# Patient Record
Sex: FEMALE | Race: WHITE | NOT HISPANIC OR LATINO | Employment: OTHER | URBAN - METROPOLITAN AREA
[De-identification: names, ages, dates, MRNs, and addresses within clinical notes are randomized per-mention and may not be internally consistent; named-entity substitution may affect disease eponyms.]

---

## 2017-02-03 ENCOUNTER — OFFICE VISIT (OUTPATIENT)
Dept: PAIN MEDICINE | Facility: CLINIC | Age: 57
End: 2017-02-03
Payer: MEDICARE

## 2017-02-03 VITALS
DIASTOLIC BLOOD PRESSURE: 80 MMHG | HEART RATE: 76 BPM | SYSTOLIC BLOOD PRESSURE: 110 MMHG | HEIGHT: 66 IN | WEIGHT: 259.56 LBS | RESPIRATION RATE: 20 BRPM | BODY MASS INDEX: 41.71 KG/M2

## 2017-02-03 DIAGNOSIS — M47.816 LUMBAR FACET ARTHROPATHY: ICD-10-CM

## 2017-02-03 DIAGNOSIS — M54.12 CERVICAL RADICULOPATHY: Primary | ICD-10-CM

## 2017-02-03 DIAGNOSIS — M47.812 FACET ARTHROPATHY, CERVICAL: ICD-10-CM

## 2017-02-03 DIAGNOSIS — M50.30 DDD (DEGENERATIVE DISC DISEASE), CERVICAL: ICD-10-CM

## 2017-02-03 PROCEDURE — 99999 PR PBB SHADOW E&M-EST. PATIENT-LVL III: CPT | Mod: PBBFAC,,, | Performed by: ANESTHESIOLOGY

## 2017-02-03 PROCEDURE — 99214 OFFICE O/P EST MOD 30 MIN: CPT | Mod: S$PBB,,, | Performed by: ANESTHESIOLOGY

## 2017-02-03 PROCEDURE — 99213 OFFICE O/P EST LOW 20 MIN: CPT | Mod: PBBFAC | Performed by: ANESTHESIOLOGY

## 2017-02-03 RX ORDER — IBUPROFEN 200 MG
200 TABLET ORAL
COMMUNITY
End: 2021-09-14

## 2017-02-03 NOTE — PROGRESS NOTES
Chronic patient Established Note (Follow up visit)      SUBJECTIVE:    Gricelda Gil presents to the clinic for a follow-up appointment for neck pain. Since the last visit, Gricelda Gil states the pain has been stable. Current pain intensity is 6/10.  She has had C7-T1 Bar x 2, last t one on 9/16/16 with > 50% pain relief, now pain is coming back     Pain Disability Index Review:  Last 3 PDI Scores 2/3/2017 9/9/2016 4/14/2016   Pain Disability Index (PDI) 27 17 31     Pain Medications:      - Opioids: None  - Adjuvant Medications:Diclofenac, Advil,Motrin ( Ibuprofen)  - Anti-Coagulants: None  - Others: See medication card       report: Reviewed and consistent with medication use as prescribed.      Pain Procedures: 9/16/16 C7-T1 CERVICAL EPIDURAL STEROID INJECTION  4/29/16 C7-T1 CERVICAL EPIDURAL STEROID INJECTION, 1/6/12, 11/17/11, 10/5/11, 6/27/11, 3/11/10 Cervical Epidural Steroid Injection      Imaging: 3/9/2016 MRI Cervical Spine Without Contrast        Narrative       Technique: Sagittal T1, sagittal T2, sagittal STIR, axial gradient and axial T2 images of the cervical spine were obtained without contrast.    Results: The craniocervical junction is intact. There is no evidence for a Chiari malformation. The spinal cord is normal in signal without cord edema or myelomalacia.    The incidentally visualized soft tissues structures of the neck are within normal limits.    Vertebral body alignment is maintained with straightening of the normal cervical lordosis. Vertebral body heights are within normal limits. There is disc desiccation throughout the cervical spine with disc space narrowing. The marrow signal is normal without evidence for a marrow placement process, infection or tumor.    At C2-3, no disk herniation, central canal stenosis or neural foraminal narrowing.    At C3-4, no disk herniation, central canal stenosis or neural foraminal narrowing.    At C4-5, no disk herniation,  central canal stenosis or neural foraminal narrowing.    At C5-6, there is bilateral uncovertebral spurring without central canal stenosis neural foraminal narrowing.    At C6-7, there is a posterior disc osteophyte complex with bilateral uncovertebral spurring contributing to mild left-sided neural foraminal narrowing.    At C7-T1, no disk herniation, central canal stenosis or neural foraminal narrowing.        Impression           Mild spondylosis of the cervical spine contribute to a most left-sided neural foraminal narrowing at C6-7. No central canal stenosis         Allergies: Review of patient's allergies indicates:  No Known Allergies    Current Medications:   Current Outpatient Prescriptions   Medication Sig Dispense Refill    atorvastatin (LIPITOR) 20 MG tablet TAKE 1 TABLET (20 MG TOTAL) BY MOUTH ONCE DAILY. 90 tablet 2    BYSTOLIC 10 mg Tab TAKE 1 TABLET (10 MG TOTAL) BY MOUTH ONCE DAILY. 30 tablet 5    clobetasol (TEMOVATE) 0.05 % cream Apply topically 2 (two) times daily. 60 g 1    diclofenac (VOLTAREN) 75 MG EC tablet TAKE 1 TABLET (75 MG TOTAL) BY MOUTH 2 (TWO) TIMES DAILY. 60 tablet 1    doxepin (SINEQUAN) 75 MG capsule Take 75 mg by mouth every evening.  3    ibuprofen (ADVIL,MOTRIN) 200 MG tablet Take 200 mg by mouth as needed for Pain.      levothyroxine (SYNTHROID) 112 MCG tablet TAKE 1 TABLET (112 MCG TOTAL) BY MOUTH ONCE DAILY. 30 tablet 5    lisinopril 10 MG tablet Take 1 tablet (10 mg total) by mouth once daily. 30 tablet 5    quetiapine (SEROQUEL) 400 MG tablet Take 200 mg by mouth nightly.   3    lamotrigine (LAMICTAL) 100 MG tablet Take 100 mg by mouth once daily.   4     No current facility-administered medications for this visit.        REVIEW OF SYSTEMS:    .GENERAL: No weight loss, malaise or fevers.  HEENT: Negative for frequent or significant headaches.  NECK: + neck pain  RESPIRATORY: Negative for cough, wheezing or shortness of breath.  CARDIOVASCULAR: Negative for chest  pain, leg swelling or palpitations.  GI: + abdominal discomfort, -ve for blood in stools or black stools or change in bowel habits.  MUSCULOSKELETAL: See HPI.  SKIN: Negative for lesions, rash, and itching.  PSYCH: + for sleep disturbance, + bipolar disorder, + anxiety   HEMATOLOGY/LYMPHOLOGY: Negative for prolonged bleeding, bruising easily or swollen nodes.  NEURO: No history of headaches, syncope, paralysis, seizures or tremors.  All other reviewed and negative other than HPI    Past Medical History:  Past Medical History   Diagnosis Date    Arthritis     Bipolar disorder     Depression     History of psychiatric care     History of psychiatric hospitalization     Hyperlipidemia     Hypertension     Insomnia     Lupus     Psychiatric problem     Self-harming behavior     Stroke 2011     TIA - neck    Suicide attempt     Therapy     Thyroid disease        Past Surgical History:  Past Surgical History   Procedure Laterality Date    Rt shoulder surgery Right 2004     scope    Uvulopalatopharyngoplasty  2014    Nasal septum surgery  2014    Hysterectomy  1981    Tonsillectomy  2014    Adenoidectomy       section  1980    Carpal tunnel release         Family History:  Family History   Problem Relation Age of Onset    Cancer Father     No Known Problems Mother     Heart disease Sister     Heart disease Maternal Aunt     Heart disease Maternal Uncle     Cancer Paternal Aunt     Heart disease Maternal Grandfather     No Known Problems Brother     No Known Problems Paternal Uncle     No Known Problems Maternal Grandmother     No Known Problems Paternal Grandfather     No Known Problems Paternal Grandmother     ADD / ADHD Neg Hx     Alcohol abuse Neg Hx     Anxiety disorder Neg Hx     Bipolar disorder Neg Hx     Depression Neg Hx     Drug abuse Neg Hx     OCD Neg Hx     Paranoid behavior Neg Hx     Schizophrenia Neg Hx     Self injury Neg Hx     Suicide Neg Hx      "Breast cancer Neg Hx     Ovarian cancer Neg Hx     Colon cancer Neg Hx        Social History:  Social History     Social History    Marital status: Single     Spouse name: N/A    Number of children: 3    Years of education: N/A     Social History Main Topics    Smoking status: Former Smoker     Types: Cigarettes     Quit date: 8/1/2009    Smokeless tobacco: Never Used    Alcohol use No    Drug use: No    Sexual activity: Not Currently      Comment: , single     Other Topics Concern    Not on file     Social History Narrative       OBJECTIVE:    Visit Vitals    /80 (BP Location: Left arm, Patient Position: Sitting, BP Method: Manual)    Pulse 76    Resp 20    Ht 5' 5.5" (1.664 m)    Wt 117.8 kg (259 lb 9.5 oz)    BMI 42.54 kg/m2       PHYSICAL EXAMINATION:    General appearance: Well appearing, in no acute distress, alert and oriented x3.Morbidly obese  Psych: Mood and affect appropriate.  Skin: Skin color, texture, turgor normal, no rashes or lesions, in both upper and lower body.  Head/face: Atraumatic, normocephalic. No palpable lymph nodes  Neck: + pain to palpation over the lower C spine . + pain with neck extension and lateral flexion  Back: Straight leg raising in the sitting and supine positions is negative to radicular pain. + TTP over the L spine   Extremities: Peripheral joint ROM is full and pain free without obvious instability or laxity in all four extremities. No deformities, edema, or skin discoloration. Good capillary refill.  Musculoskeletal: + facet loading bilaterally . Bilateral upper and lower extremity strength is normal and symmetric. No atrophy or tone abnormalities are noted.  Neuro: Bilateral upper and lower extremity coordination and muscle stretch reflexes are physiologic and symmetric. Plantar response are downgoing. No loss of sensation is noted.  Gait: Normal.    ASSESSMENT: 57 y.o. year old female with neck pain, consistent with cervical DDD, cervical " facet arthropathy and cervical radiculopathy. She has had C7-T1 Bar x 2, last t one on 9/16/16 with > 50% pain relief, now pain is coming back   She has today a new complaint of low back pain likely  related to lumbar facet arthropathy.     1. Cervical radiculopathy    2. Facet arthropathy, cervical    3. DDD (degenerative disc disease), cervical    4. Lumbar facet arthropathy          PLAN:     - I have stressed the importance of physical activity and a home exercise plan to help with pain and improve health.  -Sf bilateral C4,5,6 facet MBB  -Order xray of the L spine   - RTC 3 weeks after injection  - Counseled patient regarding the importance of activity modification, constant sleeping habits, physical therapy and weight loss.    The above plan and management options were discussed at length with patient. Patient is in agreement with the above and verbalized understanding.    Anisa Buck  02/03/2017

## 2017-02-06 ENCOUNTER — HOSPITAL ENCOUNTER (OUTPATIENT)
Dept: RADIOLOGY | Facility: HOSPITAL | Age: 57
Discharge: HOME | End: 2017-02-06
Attending: ANESTHESIOLOGY
Payer: MEDICARE

## 2017-02-06 DIAGNOSIS — M47.816 LUMBAR FACET ARTHROPATHY: ICD-10-CM

## 2017-02-06 PROCEDURE — 72100 X-RAY EXAM L-S SPINE 2/3 VWS: CPT | Mod: TC

## 2017-02-06 PROCEDURE — 72100 X-RAY EXAM L-S SPINE 2/3 VWS: CPT | Mod: 26,,, | Performed by: RADIOLOGY

## 2017-02-22 ENCOUNTER — OFFICE VISIT (OUTPATIENT)
Dept: FAMILY MEDICINE | Facility: CLINIC | Age: 57
End: 2017-02-22
Payer: MEDICARE

## 2017-02-22 VITALS
BODY MASS INDEX: 43.52 KG/M2 | HEART RATE: 88 BPM | HEIGHT: 65 IN | WEIGHT: 261.19 LBS | DIASTOLIC BLOOD PRESSURE: 86 MMHG | SYSTOLIC BLOOD PRESSURE: 120 MMHG

## 2017-02-22 DIAGNOSIS — M47.812 FACET ARTHROPATHY, CERVICAL: Primary | ICD-10-CM

## 2017-02-22 DIAGNOSIS — I10 ESSENTIAL HYPERTENSION: ICD-10-CM

## 2017-02-22 DIAGNOSIS — E03.4 HYPOTHYROIDISM DUE TO ACQUIRED ATROPHY OF THYROID: ICD-10-CM

## 2017-02-22 DIAGNOSIS — L25.9 CONTACT DERMATITIS, UNSPECIFIED CONTACT DERMATITIS TYPE, UNSPECIFIED TRIGGER: ICD-10-CM

## 2017-02-22 LAB
ANION GAP SERPL CALC-SCNC: 8 MMOL/L
BUN SERPL-MCNC: 8 MG/DL
CALCIUM SERPL-MCNC: 9.8 MG/DL
CHLORIDE SERPL-SCNC: 105 MMOL/L
CO2 SERPL-SCNC: 27 MMOL/L
CREAT SERPL-MCNC: 0.9 MG/DL
EST. GFR  (AFRICAN AMERICAN): >60 ML/MIN/1.73 M^2
EST. GFR  (NON AFRICAN AMERICAN): >60 ML/MIN/1.73 M^2
GLUCOSE SERPL-MCNC: 90 MG/DL
POTASSIUM SERPL-SCNC: 4 MMOL/L
SODIUM SERPL-SCNC: 140 MMOL/L

## 2017-02-22 PROCEDURE — 99214 OFFICE O/P EST MOD 30 MIN: CPT | Mod: S$PBB,,, | Performed by: FAMILY MEDICINE

## 2017-02-22 PROCEDURE — 80048 BASIC METABOLIC PNL TOTAL CA: CPT

## 2017-02-22 PROCEDURE — 99999 PR PBB SHADOW E&M-EST. PATIENT-LVL II: CPT | Mod: PBBFAC,,, | Performed by: FAMILY MEDICINE

## 2017-02-22 PROCEDURE — 36415 COLL VENOUS BLD VENIPUNCTURE: CPT | Mod: PBBFAC

## 2017-02-22 PROCEDURE — 99212 OFFICE O/P EST SF 10 MIN: CPT | Mod: PBBFAC | Performed by: FAMILY MEDICINE

## 2017-02-22 RX ORDER — NEBIVOLOL 10 MG/1
10 TABLET ORAL DAILY
Qty: 30 TABLET | Refills: 5 | Status: SHIPPED | OUTPATIENT
Start: 2017-02-22 | End: 2017-07-07 | Stop reason: SDUPTHER

## 2017-02-22 RX ORDER — LISINOPRIL 10 MG/1
10 TABLET ORAL DAILY
Qty: 30 TABLET | Refills: 5 | Status: SHIPPED | OUTPATIENT
Start: 2017-02-22 | End: 2017-08-18 | Stop reason: SDUPTHER

## 2017-02-22 RX ORDER — LEVOTHYROXINE SODIUM 112 UG/1
TABLET ORAL
Qty: 30 TABLET | Refills: 5 | Status: SHIPPED | OUTPATIENT
Start: 2017-02-22 | End: 2017-11-21 | Stop reason: SDUPTHER

## 2017-02-22 NOTE — MR AVS SNAPSHOT
[facility]  [address]  [facility] [address]  Phone: [phone]  Fax: [phone]                  Gricelda Gil   2017 1:00 PM   Office Visit    Description:  Female : 1960   Provider:  Major Kimble MD   Department:  [facility]           Reason for Visit     Follow-up           Diagnoses this Visit        Comments    Facet arthropathy, cervical    -  Primary     Hypothyroidism due to acquired atrophy of thyroid         Contact dermatitis, unspecified contact dermatitis type, unspecified trigger         Essential hypertension                To Do List           Future Appointments        Provider Department Dept Phone    3/10/2017 9:30 AM Pako Drew [facility] - [facility] Management [phone]    8/15/2017 10:15 AM Major Kimble MD [facility] [phone]      Goals (5 Years of Data)     None      Follow-Up and Disposition     Return in about 6 months (around 2017).    Follow-up and Disposition History       These Medications        Disp Refills Start End    levothyroxine (SYNTHROID) 112 MCG tablet 30 tablet 5 2017     TAKE 1 TABLET (112 MCG TOTAL) BY MOUTH ONCE DAILY.    Pharmacy: [facility]/pharmacy #[id number] [place], [place] - [address] Ph #: [phone]       lisinopril 10 MG tablet 30 tablet 5 2017     Take 1 tablet (10 mg total) by mouth once daily. - Oral    Pharmacy: [facility]/pharmacy #[id number] [place], [place] - [address] Ph #: [phone]       nebivolol (BYSTOLIC) 10 MG Tab 30 tablet 5 2017     Take 1 tablet (10 mg total) by mouth once daily. - Oral    Pharmacy: [facility]/pharmacy #[id number] [place], [place] - [address] Ph #: [phone]         [facility] On Call     [facility] On Call Nurse Care Line -  Assistance  Registered nurses in the [facility] provide clinical advisement, health education, appointment booking, and other advisory services.  Call for this free service at [phone].       "       Medications           Message regarding Medications     Verify the changes and/or additions to your medication regime listed below are the same as discussed with your clinician today.  If any of these changes or additions are incorrect, please notify your healthcare provider.        CHANGE how you are taking these medications     Start Taking Instead of    nebivolol (BYSTOLIC) 10 MG Tab BYSTOLIC 10 mg Tab    Dosage:  Take 1 tablet (10 mg total) by mouth once daily. Dosage:  TAKE 1 TABLET (10 MG TOTAL) BY MOUTH ONCE DAILY.    Reason for Change:  Reorder            Verify that the below list of medications is an accurate representation of the medications you are currently taking.  If none reported, the list may be blank. If incorrect, please contact your healthcare provider. Carry this list with you in case of emergency.           Current Medications     atorvastatin (LIPITOR) 20 MG tablet TAKE 1 TABLET (20 MG TOTAL) BY MOUTH ONCE DAILY.    clobetasol (TEMOVATE) 0.05 % cream Apply topically 2 (two) times daily.    diclofenac (VOLTAREN) 75 MG EC tablet TAKE 1 TABLET (75 MG TOTAL) BY MOUTH 2 (TWO) TIMES DAILY.    doxepin (SINEQUAN) 75 MG capsule Take 75 mg by mouth every evening.    ibuprofen (ADVIL,MOTRIN) 200 MG tablet Take 200 mg by mouth as needed for Pain.    levothyroxine (SYNTHROID) 112 MCG tablet TAKE 1 TABLET (112 MCG TOTAL) BY MOUTH ONCE DAILY.    lisinopril 10 MG tablet Take 1 tablet (10 mg total) by mouth once daily.    nebivolol (BYSTOLIC) 10 MG Tab Take 1 tablet (10 mg total) by mouth once daily.    quetiapine (SEROQUEL) 400 MG tablet Take 200 mg by mouth nightly.     lamotrigine (LAMICTAL) 100 MG tablet Take 100 mg by mouth once daily.            Clinical Reference Information           Your Vitals Were     BP Pulse Height Weight BMI    120/86 (BP Location: Left arm, Patient Position: Sitting, BP Method: Manual) 88 5' 5" (1.651 m) 118.5 kg (261 lb 3.2 oz) 43.47 kg/m2      Blood Pressure          " Most Recent Value    BP  120/86      Allergies as of 2/22/2017     No Known Allergies      Immunizations Administered on Date of Encounter - 2/22/2017     None      Orders Placed During Today's Visit     Future Labs/Procedures Expected by Expires    Basic metabolic panel  2/22/2017 2/22/2018      Language Assistance Services     ATTENTION: Language assistance services are available, free of charge. Please call [phone].      ATENCIÓN: Si habla español, tiene a romo disposición servicios gratuitos de asistencia lingüística. Llame al [phone].     CHÚ Ý: N?u b?n nói Ti?ng Vi?t, có các d?ch v? h? tr? ngôn ng? mi?n phí dành cho b?n. G?i s? [phone].         [facility] complies with applicable Federal civil rights laws and does not discriminate on the basis of race, color, national origin, age, disability, or sex.

## 2017-02-22 NOTE — PROGRESS NOTES
Subjective:       Patient ID: Gricelda Gil is a 57 y.o. female.    Chief Complaint: Follow-up    Pt is a 57 y.o. female who presents for evaluation and management of   Encounter Diagnoses   Name Primary?    Hypothyroidism due to acquired atrophy of thyroid     Contact dermatitis, unspecified contact dermatitis type, unspecified trigger     Essential hypertension     Facet arthropathy, cervical Yes   .  Doing well on current meds. Denies any side effects. Prevention is up to date.  Review of Systems   Constitutional: Negative for chills and fever.   Respiratory: Negative for shortness of breath.    Cardiovascular: Negative for chest pain and palpitations.   Gastrointestinal: Negative for abdominal pain, blood in stool, constipation and nausea.   Genitourinary: Negative for difficulty urinating.   Musculoskeletal: Positive for neck pain.   Psychiatric/Behavioral: Positive for sleep disturbance. Negative for dysphoric mood and suicidal ideas. The patient is not nervous/anxious.        Objective:      Physical Exam   Constitutional: She is oriented to person, place, and time. She appears well-developed and well-nourished.   HENT:   Head: Normocephalic and atraumatic.   Right Ear: External ear normal.   Left Ear: External ear normal.   Nose: Nose normal.   Mouth/Throat: Oropharynx is clear and moist.   Eyes: EOM are normal. Pupils are equal, round, and reactive to light.   Neck: Normal range of motion. Neck supple. No JVD present. No tracheal deviation present. No thyromegaly present.   Cardiovascular: Normal rate, normal heart sounds and intact distal pulses.    No murmur heard.  Pulmonary/Chest: Effort normal and breath sounds normal. No respiratory distress. She has no wheezes. She has no rales. She exhibits no tenderness.   Abdominal: Soft. Bowel sounds are normal. She exhibits no distension and no mass. There is no tenderness. There is no rebound and no guarding.   Musculoskeletal: Normal range of  motion. She exhibits no edema or tenderness.   Lymphadenopathy:     She has no cervical adenopathy.   Neurological: She is alert and oriented to person, place, and time. She has normal reflexes. She displays normal reflexes. No cranial nerve deficit. She exhibits normal muscle tone. Coordination normal.   Skin: Skin is warm and dry. No rash noted. No erythema. No pallor.   Psychiatric: She has a normal mood and affect. Her behavior is normal. Judgment and thought content normal.       Assessment:       1. Facet arthropathy, cervical    2. Hypothyroidism due to acquired atrophy of thyroid    3. Contact dermatitis, unspecified contact dermatitis type, unspecified trigger    4. Essential hypertension        Plan:   Gricelda was seen today for follow-up.    Diagnoses and all orders for this visit:    Facet arthropathy, cervical    Hypothyroidism due to acquired atrophy of thyroid  -     levothyroxine (SYNTHROID) 112 MCG tablet; TAKE 1 TABLET (112 MCG TOTAL) BY MOUTH ONCE DAILY.    Contact dermatitis, unspecified contact dermatitis type, unspecified trigger  -     lisinopril 10 MG tablet; Take 1 tablet (10 mg total) by mouth once daily.    Essential hypertension  -     Basic metabolic panel; Future  -     nebivolol (BYSTOLIC) 10 MG Tab; Take 1 tablet (10 mg total) by mouth once daily.    to see psych in April nad pain mngt in march  Follow with specialists  Needs fasting labs next visit   RTC 6 months     No Follow-up on file.

## 2017-03-10 ENCOUNTER — OFFICE VISIT (OUTPATIENT)
Dept: PAIN MEDICINE | Facility: CLINIC | Age: 57
End: 2017-03-10
Payer: MEDICARE

## 2017-03-10 VITALS
HEIGHT: 66 IN | SYSTOLIC BLOOD PRESSURE: 124 MMHG | DIASTOLIC BLOOD PRESSURE: 88 MMHG | RESPIRATION RATE: 17 BRPM | HEART RATE: 88 BPM | WEIGHT: 264.75 LBS | BODY MASS INDEX: 42.55 KG/M2

## 2017-03-10 DIAGNOSIS — M47.812 FACET ARTHROPATHY, CERVICAL: Primary | ICD-10-CM

## 2017-03-10 DIAGNOSIS — M54.2 NECK PAIN: ICD-10-CM

## 2017-03-10 DIAGNOSIS — M50.30 DDD (DEGENERATIVE DISC DISEASE), CERVICAL: ICD-10-CM

## 2017-03-10 DIAGNOSIS — M47.812 FACET HYPERTROPHY OF CERVICAL REGION: ICD-10-CM

## 2017-03-10 PROCEDURE — 99214 OFFICE O/P EST MOD 30 MIN: CPT | Mod: PBBFAC | Performed by: NURSE PRACTITIONER

## 2017-03-10 PROCEDURE — 99213 OFFICE O/P EST LOW 20 MIN: CPT | Mod: S$PBB,,, | Performed by: NURSE PRACTITIONER

## 2017-03-10 PROCEDURE — 99999 PR PBB SHADOW E&M-EST. PATIENT-LVL IV: CPT | Mod: PBBFAC,,, | Performed by: NURSE PRACTITIONER

## 2017-03-10 NOTE — PROGRESS NOTES
Chronic patient Established Note (Follow up visit)      SUBJECTIVE:    Gricelda Gil presents to the clinic for a follow-up appointment for neck pain  consistent with cervical DDD, cervical facet arthropathy and cervical radiculopathy. She has had C7-T1 Bar x 2, last t one on 16 with > 50% pain relief, now pain is coming back, she was previously s/f an injection but had to reschedule, discussed that we will s/f a bilateral C4,5,6 MBB facet injection, patient understood and agreed.  Since the last visit, Gricelda Gil states the pain has been stable. Current pain intensity is 7/10.    Pain Disability Index Review:  Last 3 PDI Scores 3/10/2017 2/3/2017 2016   Pain Disability Index (PDI) 17 27 17     Pain Medications:      - Opioids: None  - Adjuvant Medications:Diclofenac, Advil,Motrin ( Ibuprofen)  - Anti-Coagulants: None  - Others: See medication card       report: Reviewed and consistent with medication use as prescribed.      Pain Procedures: 16 C7-T1 CERVICAL EPIDURAL STEROID INJECTION 16 C7-T1 CERVICAL EPIDURAL STEROID INJECTION, 12, 11, 10/5/11, 11, 3/11/10 Cervical Epidural Steroid Injection      Imagin17 X-Ray Lumbar Spine AP And Lateral  Narrative   Lumbar spine, AP and lateral: There is a mild dextroscoliotic curvature of the lumbar spine.  Vertebral body heights are maintained.  There is minimal disc space narrowing with end plate sclerosis throughout.  There is facet arthropathy the lower lumbar spine.  No fracture or subluxation is identified.  Vascular calcifications are noted.   Impression    As above.            3/9/2016 MRI Cervical Spine Without Contrast          Narrative       Technique: Sagittal T1, sagittal T2, sagittal STIR, axial gradient and axial T2 images of the cervical spine were obtained without contrast.    Results: The craniocervical junction is intact. There is no evidence for a Chiari malformation. The spinal cord is  normal in signal without cord edema or myelomalacia.    The incidentally visualized soft tissues structures of the neck are within normal limits.    Vertebral body alignment is maintained with straightening of the normal cervical lordosis. Vertebral body heights are within normal limits. There is disc desiccation throughout the cervical spine with disc space narrowing. The marrow signal is normal without evidence for a marrow placement process, infection or tumor.    At C2-3, no disk herniation, central canal stenosis or neural foraminal narrowing.    At C3-4, no disk herniation, central canal stenosis or neural foraminal narrowing.    At C4-5, no disk herniation, central canal stenosis or neural foraminal narrowing.    At C5-6, there is bilateral uncovertebral spurring without central canal stenosis neural foraminal narrowing.    At C6-7, there is a posterior disc osteophyte complex with bilateral uncovertebral spurring contributing to mild left-sided neural foraminal narrowing.    At C7-T1, no disk herniation, central canal stenosis or neural foraminal narrowing.        Impression           Mild spondylosis of the cervical spine contribute to a most left-sided neural foraminal narrowing at C6-7. No central canal stenosis         Allergies: Review of patient's allergies indicates:  No Known Allergies    Current Medications:   Current Outpatient Prescriptions   Medication Sig Dispense Refill    atorvastatin (LIPITOR) 20 MG tablet TAKE 1 TABLET (20 MG TOTAL) BY MOUTH ONCE DAILY. 90 tablet 2    clobetasol (TEMOVATE) 0.05 % cream Apply topically 2 (two) times daily. 60 g 1    diclofenac (VOLTAREN) 75 MG EC tablet TAKE 1 TABLET (75 MG TOTAL) BY MOUTH 2 (TWO) TIMES DAILY. 60 tablet 1    doxepin (SINEQUAN) 75 MG capsule Take 75 mg by mouth every evening.  3    ibuprofen (ADVIL,MOTRIN) 200 MG tablet Take 200 mg by mouth as needed for Pain.      lamotrigine (LAMICTAL) 100 MG tablet Take 100 mg by mouth once daily.   4     levothyroxine (SYNTHROID) 112 MCG tablet TAKE 1 TABLET (112 MCG TOTAL) BY MOUTH ONCE DAILY. 30 tablet 5    lisinopril 10 MG tablet Take 1 tablet (10 mg total) by mouth once daily. 30 tablet 5    nebivolol (BYSTOLIC) 10 MG Tab Take 1 tablet (10 mg total) by mouth once daily. 30 tablet 5    quetiapine (SEROQUEL) 400 MG tablet Take 200 mg by mouth nightly.   3     No current facility-administered medications for this visit.        REVIEW OF SYSTEMS:    GENERAL: No weight loss, malaise or fevers.  HEENT: Negative for frequent or significant headaches.  NECK: + neck pain  RESPIRATORY: Negative for cough, wheezing or shortness of breath.  CARDIOVASCULAR: Negative for chest pain, leg swelling or palpitations.  GI: + abdominal discomfort, -ve for blood in stools or black stools or change in bowel habits.  MUSCULOSKELETAL: See HPI.  SKIN: Negative for lesions, rash, and itching.  PSYCH: + for sleep disturbance, + bipolar disorder, + anxiety   HEMATOLOGY/LYMPHOLOGY: Negative for prolonged bleeding, bruising easily or swollen nodes.  NEURO: No history of headaches, syncope, paralysis, seizures or tremors.  All other reviewed and negative other than HPI    Past Medical History:  Past Medical History:   Diagnosis Date    Arthritis     Bipolar disorder     Depression     History of psychiatric care     History of psychiatric hospitalization     Hyperlipidemia     Hypertension     Insomnia     Lupus     Psychiatric problem     Self-harming behavior     Stroke 2011    TIA - neck    Suicide attempt     Therapy     Thyroid disease        Past Surgical History:  Past Surgical History:   Procedure Laterality Date    ADENOIDECTOMY      CARPAL TUNNEL RELEASE  1983     SECTION  1980    HYSTERECTOMY  1981    NASAL SEPTUM SURGERY  2014    Rt shoulder surgery Right 2004    scope    TONSILLECTOMY  2014    UVULOPALATOPHARYNGOPLASTY  2014       Family History:  Family History   Problem Relation Age of Onset  "   Cancer Father     No Known Problems Mother     Heart disease Sister     Heart disease Maternal Aunt     Heart disease Maternal Uncle     Cancer Paternal Aunt     Heart disease Maternal Grandfather     No Known Problems Brother     No Known Problems Paternal Uncle     No Known Problems Maternal Grandmother     No Known Problems Paternal Grandfather     No Known Problems Paternal Grandmother     ADD / ADHD Neg Hx     Alcohol abuse Neg Hx     Anxiety disorder Neg Hx     Bipolar disorder Neg Hx     Depression Neg Hx     Drug abuse Neg Hx     OCD Neg Hx     Paranoid behavior Neg Hx     Schizophrenia Neg Hx     Self injury Neg Hx     Suicide Neg Hx     Breast cancer Neg Hx     Ovarian cancer Neg Hx     Colon cancer Neg Hx        Social History:  Social History     Social History    Marital status: Single     Spouse name: N/A    Number of children: 3    Years of education: N/A     Social History Main Topics    Smoking status: Former Smoker     Types: Cigarettes     Quit date: 8/1/2009    Smokeless tobacco: Never Used    Alcohol use No    Drug use: No    Sexual activity: Not Currently      Comment: , single     Other Topics Concern    Not on file     Social History Narrative       OBJECTIVE:    /88 (BP Location: Right arm, Patient Position: Sitting, BP Method: Manual)  Pulse 88  Resp 17  Ht 5' 5.5" (1.664 m)  Wt 120.1 kg (264 lb 12.4 oz)  BMI 43.39 kg/m2    PHYSICAL EXAMINATION:    General appearance: Well appearing, in no acute distress, alert and oriented x3.Morbidly obese  Psych: Mood and affect appropriate.  Skin: Skin color, texture, turgor normal, no rashes or lesions, in both upper and lower body.  Head/face: Atraumatic, normocephalic. No palpable lymph nodes  Neck: + pain to palpation over the lower C spine . + pain with neck extension and lateral flexion  Back: Straight leg raising in the sitting and supine positions is negative to radicular pain. + TTP over " the L spine   Extremities: Peripheral joint ROM is full and pain free without obvious instability or laxity in all four extremities. No deformities, edema, or skin discoloration. Good capillary refill.  Musculoskeletal: + facet loading bilaterally . Bilateral upper and lower extremity strength is normal and symmetric. No atrophy or tone abnormalities are noted.  Neuro: Bilateral upper and lower extremity coordination and muscle stretch reflexes are physiologic and symmetric. Plantar response are downgoing. No loss of sensation is noted.  Gait: Normal.     ASSESSMENT: 57 y.o. year old female with neck pain, consistent with cervical DDD, cervical facet arthropathy and cervical radiculopathy. She has had C7-T1 Bar x 2, last t one on 9/16/16 with > 50% pain relief, now pain is coming back        1. Facet arthropathy, cervical     2. Facet hypertrophy of cervical region     3. DDD (degenerative disc disease), cervical     4. Neck pain  Case Request Operating Room: BLOCK-NERVE-MEDIAL BRANCH-CERVICAL- Bilateral C4,5,6    [facility]         PLAN:     - I have stressed the importance of physical activity and a home exercise plan to help with pain and improve health.  - Patient can continue with medications for now since they are providing benefits, using them appropriately, and without side effects.  - Counseled patient regarding the importance of activity modification, constant sleeping habits and physical therapy.  - Schedule for a Diagnostic/Therapeutic Medial branch block at Cervical @ C 4, 5, 6 bilaterally  to help with axial neck pain and progress with a home exercise program.  -I have explained the risks, benefits, and alternatives of the procedure in detail. The patient voices understanding and all questions have been answered. The patient agrees to proceed as planned. Written Consent obtained.   -RTC 2-3 weeks following procedure.  -The above plan and management options were discussed at length with  patient. Patient is in agreement with the above and verbalized understanding. The physician  was consulted on this patient  and agrees with this plan.     STEVE Munoz    03/10/2017

## 2017-03-12 RX ORDER — DICLOFENAC SODIUM 75 MG/1
TABLET, DELAYED RELEASE ORAL
Qty: 60 TABLET | Refills: 1 | Status: SHIPPED | OUTPATIENT
Start: 2017-03-12 | End: 2017-07-07 | Stop reason: ALTCHOICE

## 2017-03-20 ENCOUNTER — OFFICE VISIT (OUTPATIENT)
Dept: INTERNAL MEDICINE | Facility: CLINIC | Age: 57
End: 2017-03-20
Payer: MEDICARE

## 2017-03-20 ENCOUNTER — PATIENT MESSAGE (OUTPATIENT)
Dept: ADMINISTRATIVE | Facility: OTHER | Age: 57
End: 2017-03-20

## 2017-03-20 VITALS
OXYGEN SATURATION: 98 % | HEIGHT: 66 IN | SYSTOLIC BLOOD PRESSURE: 140 MMHG | HEART RATE: 98 BPM | BODY MASS INDEX: 42.27 KG/M2 | TEMPERATURE: 100 F | DIASTOLIC BLOOD PRESSURE: 94 MMHG | WEIGHT: 263 LBS | RESPIRATION RATE: 18 BRPM

## 2017-03-20 DIAGNOSIS — B34.9 VIRAL SYNDROME: Primary | ICD-10-CM

## 2017-03-20 LAB
BILIRUB SERPL-MCNC: NEGATIVE MG/DL
BLOOD URINE, POC: NEGATIVE
COLOR, POC UA: YELLOW
GLUCOSE UR QL STRIP: NEGATIVE
KETONES UR QL STRIP: NEGATIVE
LEUKOCYTE ESTERASE URINE, POC: NEGATIVE
NITRITE, POC UA: NEGATIVE
PH, POC UA: 5
PROTEIN, POC: NEGATIVE
SPECIFIC GRAVITY, POC UA: 1
UROBILINOGEN, POC UA: NEGATIVE

## 2017-03-20 PROCEDURE — 81001 URINALYSIS AUTO W/SCOPE: CPT | Mod: PBBFAC | Performed by: NURSE PRACTITIONER

## 2017-03-20 PROCEDURE — 1160F RVW MEDS BY RX/DR IN RCRD: CPT | Performed by: NURSE PRACTITIONER

## 2017-03-20 PROCEDURE — 99999 PR PBB SHADOW E&M-EST. PATIENT-LVL IV: CPT | Mod: PBBFAC,,, | Performed by: NURSE PRACTITIONER

## 2017-03-20 PROCEDURE — 99213 OFFICE O/P EST LOW 20 MIN: CPT | Mod: S$PBB | Performed by: NURSE PRACTITIONER

## 2017-03-20 PROCEDURE — 99214 OFFICE O/P EST MOD 30 MIN: CPT | Mod: PBBFAC | Performed by: NURSE PRACTITIONER

## 2017-03-20 PROCEDURE — 3080F DIAST BP >= 90 MM HG: CPT | Performed by: NURSE PRACTITIONER

## 2017-03-20 PROCEDURE — 3077F SYST BP >= 140 MM HG: CPT | Performed by: NURSE PRACTITIONER

## 2017-03-20 NOTE — PROGRESS NOTES
Subjective:       Patient ID: Gricelda Gil is a 57 y.o. female.    Chief Complaint: Fever (x 5 days) and Otalgia    HPI: pt new to me but known to Dr. CALVILLO presents with c/o low grade fever up to 100 every evening since wed of last week. Reports this along with left ear pain and sore muscles in her back.     Review of Systems   Constitutional: Positive for chills, diaphoresis, fatigue and fever.   HENT: Positive for postnasal drip, rhinorrhea and sneezing. Negative for congestion, sinus pressure and sore throat.    Respiratory: Positive for cough. Negative for shortness of breath and wheezing.    Cardiovascular: Negative for chest pain.   Gastrointestinal: Negative for abdominal distention, abdominal pain, constipation, diarrhea, nausea and vomiting.   Musculoskeletal: Positive for myalgias. Negative for arthralgias and back pain.   Neurological: Negative for headaches.   Psychiatric/Behavioral: Negative for sleep disturbance.       Objective:      Physical Exam   Constitutional: She is oriented to person, place, and time. She appears well-developed and well-nourished.   HENT:   Head: Normocephalic and atraumatic.   Cardiovascular: Normal rate, regular rhythm and normal heart sounds.    Pulmonary/Chest: Effort normal and breath sounds normal.   Abdominal: Soft. Bowel sounds are normal. There is no tenderness.   Musculoskeletal: She exhibits no edema.   Neurological: She is alert and oriented to person, place, and time.   Skin: Skin is warm and dry.   Psychiatric: She has a normal mood and affect. Her behavior is normal. Judgment and thought content normal.   Nursing note and vitals reviewed.      Assessment:       1. Viral syndrome        Plan:   1. Viral syndrome  Seems mostly viral, if any worse, please let us know. TLC.

## 2017-03-20 NOTE — MR AVS SNAPSHOT
[place] [facility]  [address] [facility] [address]  Phone: [phone]  Fax: [phone]                  Gricelda Gil   3/20/2017 12:30 PM   Office Visit    Description:  Female : 1960   Provider:  Chasidy Elliott NP   Department:  [facility]           Reason for Visit     Fever     Otalgia           Diagnoses this Visit        Comments    Viral syndrome    -  Primary            To Do List           Future Appointments        Provider Department Dept Phone    8/15/2017 10:15 AM Major Kimble MD [facility] [phone]      Your Future Surgeries/Procedures     Mar 24, 2017   Surgery with Anisa Buck MD   [facility]    [address] [place] [id number] [phone]              Goals (5 Years of Data)     None      Follow-Up and Disposition     Return if symptoms worsen or fail to improve.      [facility]     [facility] Nurse Care Line -  Assistance  Registered nurses in the [facility] provide clinical advisement, health education, appointment booking, and other advisory services.  Call for this free service at [phone].             Medications           Message regarding Medications     Verify the changes and/or additions to your medication regime listed below are the same as discussed with your clinician today.  If any of these changes or additions are incorrect, please notify your healthcare provider.             Verify that the below list of medications is an accurate representation of the medications you are currently taking.  If none reported, the list may be blank. If incorrect, please contact your healthcare provider. Carry this list with you in case of emergency.           Current Medications     atorvastatin (LIPITOR) 20 MG tablet TAKE 1 TABLET (20 MG TOTAL) BY MOUTH ONCE DAILY.    clobetasol (TEMOVATE) 0.05 % cream Apply topically 2 (two)  "times daily.    diclofenac (VOLTAREN) 75 MG EC tablet TAKE 1 TABLET (75 MG TOTAL) BY MOUTH 2 (TWO) TIMES DAILY.    doxepin (SINEQUAN) 75 MG capsule Take 75 mg by mouth every evening.    ibuprofen (ADVIL,MOTRIN) 200 MG tablet Take 200 mg by mouth as needed for Pain.    lamotrigine (LAMICTAL) 100 MG tablet Take 100 mg by mouth once daily.     levothyroxine (SYNTHROID) 112 MCG tablet TAKE 1 TABLET (112 MCG TOTAL) BY MOUTH ONCE DAILY.    lisinopril 10 MG tablet Take 1 tablet (10 mg total) by mouth once daily.    nebivolol (BYSTOLIC) 10 MG Tab Take 1 tablet (10 mg total) by mouth once daily.    quetiapine (SEROQUEL) 400 MG tablet Take 200 mg by mouth nightly.            Clinical Reference Information           Your Vitals Were     BP Pulse Temp Resp Height Weight    140/94 98 99.5 °F (37.5 °C) (Tympanic) 18 5' 5.5" (1.664 m) 119.3 kg (263 lb 0.1 oz)    SpO2 BMI             98% 43.1 kg/m2         Blood Pressure          Most Recent Value    BP  (!)  140/94      Allergies as of 3/20/2017     No Known Allergies      Immunizations Administered on Date of Encounter - 3/20/2017     None      Language Assistance Services     ATTENTION: Language assistance services are available, free of charge. Please call [phone].      ATENCIÓN: Si adelaidala gely, tiene a romo disposición servicios gratuitos de asistencia lingüística. Llame al [phone].     [facility] Ý: N?u b?n nói Ti?ng Vi?t, có các d?ch v? h? tr? ngôn ng? mi?n phí dành cho b?n. G?i s? [phone].         [facility] complies with applicable Federal civil rights laws and does not discriminate on the basis of race, color, national origin, age, disability, or sex.        "

## 2017-03-24 ENCOUNTER — OFFICE VISIT (OUTPATIENT)
Dept: FAMILY MEDICINE | Facility: CLINIC | Age: 57
End: 2017-03-24
Payer: MEDICARE

## 2017-03-24 VITALS
RESPIRATION RATE: 18 BRPM | BODY MASS INDEX: 42.05 KG/M2 | HEIGHT: 66 IN | TEMPERATURE: 99 F | DIASTOLIC BLOOD PRESSURE: 90 MMHG | SYSTOLIC BLOOD PRESSURE: 142 MMHG | HEART RATE: 94 BPM | WEIGHT: 261.63 LBS

## 2017-03-24 DIAGNOSIS — R50.9 PROLONGED FEVER: ICD-10-CM

## 2017-03-24 DIAGNOSIS — J32.9 SINUSITIS, UNSPECIFIED CHRONICITY, UNSPECIFIED LOCATION: Primary | ICD-10-CM

## 2017-03-24 PROCEDURE — 1160F RVW MEDS BY RX/DR IN RCRD: CPT | Performed by: NURSE PRACTITIONER

## 2017-03-24 PROCEDURE — 99999 PR PBB SHADOW E&M-EST. PATIENT-LVL III: CPT | Mod: PBBFAC,,, | Performed by: NURSE PRACTITIONER

## 2017-03-24 PROCEDURE — 99213 OFFICE O/P EST LOW 20 MIN: CPT | Mod: S$PBB | Performed by: NURSE PRACTITIONER

## 2017-03-24 PROCEDURE — 99213 OFFICE O/P EST LOW 20 MIN: CPT | Mod: PBBFAC | Performed by: NURSE PRACTITIONER

## 2017-03-24 PROCEDURE — 96372 THER/PROPH/DIAG INJ SC/IM: CPT | Mod: PBBFAC

## 2017-03-24 PROCEDURE — 3077F SYST BP >= 140 MM HG: CPT | Performed by: NURSE PRACTITIONER

## 2017-03-24 PROCEDURE — 3080F DIAST BP >= 90 MM HG: CPT | Performed by: NURSE PRACTITIONER

## 2017-03-24 RX ORDER — METHYLPREDNISOLONE ACETATE 40 MG/ML
60 INJECTION, SUSPENSION INTRA-ARTICULAR; INTRALESIONAL; INTRAMUSCULAR; SOFT TISSUE
Status: COMPLETED | OUTPATIENT
Start: 2017-03-24 | End: 2017-03-24

## 2017-03-24 RX ORDER — AZITHROMYCIN 500 MG/1
500 TABLET, FILM COATED ORAL DAILY
Qty: 3 TABLET | Refills: 0 | Status: SHIPPED | OUTPATIENT
Start: 2017-03-24 | End: 2017-07-17 | Stop reason: SDUPTHER

## 2017-03-24 RX ORDER — LINCOMYCIN HYDROCHLORIDE 300 MG/ML
600 INJECTION, SOLUTION INTRAMUSCULAR; INTRAVENOUS; SUBCONJUNCTIVAL
Status: COMPLETED | OUTPATIENT
Start: 2017-03-24 | End: 2017-03-24

## 2017-03-24 RX ADMIN — LINCOMYCIN HYDROCHLORIDE 600 MG: 300 INJECTION, SOLUTION INTRAMUSCULAR; INTRAVENOUS; SUBCONJUNCTIVAL at 02:03

## 2017-03-24 RX ADMIN — METHYLPREDNISOLONE ACETATE 60 MG: 40 INJECTION, SUSPENSION INTRA-ARTICULAR; INTRALESIONAL; INTRAMUSCULAR; SOFT TISSUE at 02:03

## 2017-03-24 NOTE — MR AVS SNAPSHOT
[facility]  [address] [address]  Phone: [phone]  Fax: [phone]                  Gricelda Gil   3/24/2017 1:45 PM   Office Visit    Description:  Female : 1960   Provider:  Elsie Ball NP   Department:  [facility]           Reason for Visit     Fever     Otalgia           Diagnoses this Visit        Comments    Sinusitis, unspecified chronicity, unspecified location    -  Primary     Prolonged fever                To Do List           Future Appointments        Provider Department Dept Phone    8/15/2017 10:15 AM Major Kimble MD [facility] [phone]      Goals (5 Years of Data)     None      Follow-Up and Disposition     Return if symptoms worsen or fail to improve.       These Medications        Disp Refills Start End    azithromycin (ZITHROMAX) 500 MG tablet 3 tablet 0 3/24/2017 3/27/2017    Take 1 tablet (500 mg total) by mouth once daily. - Oral    Pharmacy: [facility]/pharmacy #[id number] - [place] - [address] [facility] [address]  #: [phone]         [facility]     [facility] Care [facility] -  Assistance  Registered nurses in the [facility] provide clinical advisement, health education, appointment booking, and other advisory services.  Call for this free service at [phone].             Medications           Message regarding Medications     Verify the changes and/or additions to your medication regime listed below are the same as discussed with your clinician today.  If any of these changes or additions are incorrect, please notify your healthcare provider.        START taking these NEW medications        Refills    azithromycin (ZITHROMAX) 500 MG tablet 0    Sig: Take 1 tablet (500 mg total) by mouth once daily.    Class: Normal    Route: Oral      These medications were administered today        Dose Freq    methylPREDNISolone acetate injection 60 mg 60 mg Clinic/HOD 1 time  "   Sig: Inject 1.5 mLs (60 mg total) into the muscle one time.    Class: Normal    Route: Intramuscular    lincomycin injection 600 mg 600 mg Clinic/HOD 1 time    Sig: Inject 2 mLs (600 mg total) into the muscle one time.    Class: Normal    Route: Intramuscular           Verify that the below list of medications is an accurate representation of the medications you are currently taking.  If none reported, the list may be blank. If incorrect, please contact your healthcare provider. Carry this list with you in case of emergency.           Current Medications     atorvastatin (LIPITOR) 20 MG tablet TAKE 1 TABLET (20 MG TOTAL) BY MOUTH ONCE DAILY.    azithromycin (ZITHROMAX) 500 MG tablet Take 1 tablet (500 mg total) by mouth once daily.    clobetasol (TEMOVATE) 0.05 % cream Apply topically 2 (two) times daily.    diclofenac (VOLTAREN) 75 MG EC tablet TAKE 1 TABLET (75 MG TOTAL) BY MOUTH 2 (TWO) TIMES DAILY.    doxepin (SINEQUAN) 75 MG capsule Take 75 mg by mouth every evening.    ibuprofen (ADVIL,MOTRIN) 200 MG tablet Take 200 mg by mouth as needed for Pain.    lamotrigine (LAMICTAL) 100 MG tablet Take 100 mg by mouth once daily.     levothyroxine (SYNTHROID) 112 MCG tablet TAKE 1 TABLET (112 MCG TOTAL) BY MOUTH ONCE DAILY.    lisinopril 10 MG tablet Take 1 tablet (10 mg total) by mouth once daily.    nebivolol (BYSTOLIC) 10 MG Tab Take 1 tablet (10 mg total) by mouth once daily.    quetiapine (SEROQUEL) 400 MG tablet Take 200 mg by mouth nightly.            Clinical Reference Information           Your Vitals Were     BP Pulse Temp Resp    142/90 (BP Location: Left arm, Patient Position: Sitting, BP Method: Manual) 94 99.1 °F (37.3 °C) (Tympanic) 18    Height Weight BMI    5' 5.5" (1.664 m) 118.7 kg (261 lb 9.6 oz) 42.87 kg/m2      Blood Pressure          Most Recent Value    BP  (!)  142/90      Allergies as of 3/24/2017     No Known Allergies      Immunizations Administered on Date of Encounter - 3/24/2017     None "      Language Assistance Services     ATTENTION: Language assistance services are available, free of charge. Please call [phone].      ATENCIÓN: Si habla gely, tiene a romo disposición servicios gratuitos de asistencia lingüística. Llame al [phone].     CHÚ Ý: N?u b?n nói Ti?ng Vi?t, có các d?ch v? h? tr? ngôn ng? mi?n phí dành cho b?n. G?i s? [phone].         [facility] complies with applicable Federal civil rights laws and does not discriminate on the basis of race, color, national origin, age, disability, or sex.

## 2017-03-24 NOTE — PROGRESS NOTES
Subjective:       Patient ID: Gricelda Gil is a 57 y.o. female.    Chief Complaint: Fever and Otalgia (left)    Fever    Episode onset: 1 week ago. The maximum temperature noted was 99 to 99.9 F. Associated symptoms include congestion, ear pain and a sore throat. Pertinent negatives include no abdominal pain, coughing, diarrhea, headaches, nausea, vomiting or wheezing.   Otalgia    There is pain in the left ear. Episode onset: 1 week ago. Associated symptoms include rhinorrhea and a sore throat. Pertinent negatives include no abdominal pain, coughing, diarrhea, headaches or vomiting.     Review of Systems   Constitutional: Positive for fatigue and fever. Negative for appetite change.   HENT: Positive for congestion, ear pain, postnasal drip, rhinorrhea and sore throat.    Eyes: Negative.  Negative for visual disturbance.   Respiratory: Negative.  Negative for cough, shortness of breath and wheezing.    Cardiovascular: Negative.    Gastrointestinal: Negative.  Negative for abdominal pain, diarrhea, nausea and vomiting.   Endocrine: Negative.    Genitourinary: Negative.  Negative for difficulty urinating and urgency.   Musculoskeletal: Negative.  Negative for arthralgias and myalgias.   Skin: Negative.  Negative for color change.   Allergic/Immunologic: Negative.    Neurological: Negative.  Negative for dizziness and headaches.   Hematological: Negative.    Psychiatric/Behavioral: Negative.  Negative for sleep disturbance.   All other systems reviewed and are negative.      Objective:      Physical Exam   Constitutional: She is oriented to person, place, and time. She appears well-developed and well-nourished.   HENT:   Head: Normocephalic and atraumatic.   Right Ear: External ear normal. A middle ear effusion is present.   Left Ear: External ear normal. A middle ear effusion is present.   Nose: Mucosal edema and rhinorrhea present.   Mouth/Throat: Uvula is midline and oropharynx is clear and moist.   Red,  raw nasal congestion   Eyes: Conjunctivae are normal. Pupils are equal, round, and reactive to light.   Neck: Trachea normal and normal range of motion. Neck supple. No thyromegaly present.   Cardiovascular: Normal rate, regular rhythm, S1 normal, S2 normal and normal heart sounds.    No murmur heard.  Pulmonary/Chest: Effort normal and breath sounds normal. No respiratory distress.   Abdominal: Soft. Normal appearance.   Musculoskeletal: Normal range of motion.   Lymphadenopathy:     She has no cervical adenopathy.   Neurological: She is alert and oriented to person, place, and time.   Skin: Skin is warm, dry and intact.   Psychiatric: She has a normal mood and affect. Her speech is normal.   Nursing note and vitals reviewed.      Assessment:       1. Sinusitis, unspecified chronicity, unspecified location    2. Prolonged fever        Plan:   Gricelda was seen today for fever and otalgia.    Diagnoses and all orders for this visit:    Sinusitis, unspecified chronicity, unspecified location  -     methylPREDNISolone acetate injection 60 mg; Inject 1.5 mLs (60 mg total) into the muscle one time.  -     lincomycin injection 600 mg; Inject 2 mLs (600 mg total) into the muscle one time.  -     azithromycin (ZITHROMAX) 500 MG tablet; Take 1 tablet (500 mg total) by mouth once daily.    Prolonged fever    RTC PRN

## 2017-03-27 ENCOUNTER — HOSPITAL ENCOUNTER (EMERGENCY)
Facility: HOSPITAL | Age: 57
Discharge: HOME | End: 2017-03-27
Attending: SURGERY
Payer: MEDICARE

## 2017-03-27 VITALS — TEMPERATURE: 98 F

## 2017-03-27 DIAGNOSIS — L23.7 POISON SUMAC: Primary | ICD-10-CM

## 2017-03-27 PROCEDURE — 63600175 PHARM REV CODE 636 W HCPCS: Performed by: SURGERY

## 2017-03-27 PROCEDURE — 25000003 PHARM REV CODE 250: Performed by: SURGERY

## 2017-03-27 PROCEDURE — 99283 EMERGENCY DEPT VISIT LOW MDM: CPT | Mod: 25

## 2017-03-27 PROCEDURE — 96372 THER/PROPH/DIAG INJ SC/IM: CPT

## 2017-03-27 RX ORDER — METHYLPREDNISOLONE 4 MG/1
TABLET ORAL
Qty: 1 PACKAGE | Refills: 0 | Status: SHIPPED | OUTPATIENT
Start: 2017-03-27 | End: 2017-07-07 | Stop reason: ALTCHOICE

## 2017-03-27 RX ORDER — DESONIDE 0.5 MG/G
CREAM TOPICAL
Qty: 1 TUBE | Refills: 0 | Status: SHIPPED | OUTPATIENT
Start: 2017-03-27 | End: 2017-07-07 | Stop reason: ALTCHOICE

## 2017-03-27 RX ORDER — HYDROXYZINE PAMOATE 50 MG/1
50 CAPSULE ORAL EVERY 6 HOURS PRN
Qty: 20 CAPSULE | Refills: 0 | Status: SHIPPED | OUTPATIENT
Start: 2017-03-27 | End: 2017-07-07 | Stop reason: ALTCHOICE

## 2017-03-27 RX ORDER — FAMOTIDINE 20 MG/1
40 TABLET, FILM COATED ORAL
Status: COMPLETED | OUTPATIENT
Start: 2017-03-27 | End: 2017-03-27

## 2017-03-27 RX ORDER — FAMOTIDINE 20 MG/1
40 TABLET, FILM COATED ORAL 2 TIMES DAILY
Qty: 20 TABLET | Refills: 0 | Status: SHIPPED | OUTPATIENT
Start: 2017-03-27 | End: 2017-07-07 | Stop reason: ALTCHOICE

## 2017-03-27 RX ORDER — DIPHENHYDRAMINE HYDROCHLORIDE 50 MG/ML
50 INJECTION INTRAMUSCULAR; INTRAVENOUS
Status: COMPLETED | OUTPATIENT
Start: 2017-03-27 | End: 2017-03-27

## 2017-03-27 RX ADMIN — METHYLPREDNISOLONE SODIUM SUCCINATE 125 MG: 125 INJECTION, POWDER, FOR SOLUTION INTRAMUSCULAR; INTRAVENOUS at 05:03

## 2017-03-27 RX ADMIN — DIPHENHYDRAMINE HYDROCHLORIDE 50 MG: 50 INJECTION, SOLUTION INTRAMUSCULAR; INTRAVENOUS at 05:03

## 2017-03-27 RX ADMIN — FAMOTIDINE 40 MG: 20 TABLET, FILM COATED ORAL at 05:03

## 2017-03-27 NOTE — ED AVS SNAPSHOT
[facility] [address] [place] [address]               Gricelda Gil   3/27/2017  5:46 PM   ED    Description:  Female : 1960   Department:  [facility]           Your Care was Coordinated By:     Provider Role From To    Michael Stokes MD Attending Provider [id number] --      Reason for Visit     Rash           Diagnoses this Visit        Comments    Poison sumac    -  Primary       ED Disposition     ED Disposition Condition Comment    Discharge             To Do List           Follow-up Information     Follow up with Nadya Ruffin MD. Schedule an appointment as soon as possible for a visit in 2 days.    Specialty:  Dermatology    Contact information:    [address] [facility] [id number] [phone]         These Medications        Disp Refills Start End    desonide (DESOWEN) 0.05 % cream 1 Tube 0 3/27/2017 3/28/2017    Apply topically every 24 hours as needed. - Topical (Top)    Pharmacy: [facility]/pharmacy #[id number] [facility] [place] - [address] Ph #: [phone]       methylPREDNISolone (MEDROL DOSEPACK) 4 mg tablet 1 Package 0 3/27/2017     As directed    Pharmacy: [facility]/pharmacy #[id number] [facility] [place] - [address] Ph #: [phone]       hydrOXYzine pamoate (VISTARIL) 50 MG Cap 20 capsule 0 3/27/2017     Take 1 capsule (50 mg total) by mouth every 6 (six) hours as needed (itch). - Oral    Pharmacy: [facility]/pharmacy #[id number] [facility] [place] - [address] Ph #: [phone]       famotidine (PEPCID) 20 MG tablet 20 tablet 0 3/27/2017 2017    Take 2 tablets (40 mg total) by mouth 2 (two) times daily. - Oral    Pharmacy: [facility]/pharmacy #[id number]  [place] - [address] Ph #: [phone]         [facility] On Call     [facility] On Call Nurse Care Line -  Assistance  Registered nurses in the [facility] provide clinical advisement, health education, appointment booking, and other advisory services.  Call for this free service  at [phone].             Medications           Message regarding Medications     Verify the changes and/or additions to your medication regime listed below are the same as discussed with your clinician today.  If any of these changes or additions are incorrect, please notify your healthcare provider.        START taking these NEW medications        Refills    desonide (DESOWEN) 0.05 % cream 0    Sig: Apply topically every 24 hours as needed.    Class: Normal    Route: Topical (Top)    methylPREDNISolone (MEDROL DOSEPACK) 4 mg tablet 0    Sig: As directed    Class: Normal    hydrOXYzine pamoate (VISTARIL) 50 MG Cap 0    Sig: Take 1 capsule (50 mg total) by mouth every 6 (six) hours as needed (itch).    Class: Normal    Route: Oral    famotidine (PEPCID) 20 MG tablet 0    Sig: Take 2 tablets (40 mg total) by mouth 2 (two) times daily.    Class: Normal    Route: Oral      These medications were administered today        Dose Freq    methylPREDNISolone sod suc(PF) 125 mg/2 mL injection 125 mg 125 mg ED 1 Time    Sig: Inject 125 mg into the muscle ED 1 Time.    Class: Normal    Route: Intramuscular    diphenhydrAMINE injection 50 mg 50 mg ED 1 Time    Sig: Inject 1 mL (50 mg total) into the muscle ED 1 Time.    Class: Normal    Route: Intramuscular    famotidine tablet 40 mg 40 mg ED 1 Time    Sig: Take 2 tablets (40 mg total) by mouth ED 1 Time.    Class: Normal    Route: Oral           Verify that the below list of medications is an accurate representation of the medications you are currently taking.  If none reported, the list may be blank. If incorrect, please contact your healthcare provider. Carry this list with you in case of emergency.           Current Medications     atorvastatin (LIPITOR) 20 MG tablet TAKE 1 TABLET (20 MG TOTAL) BY MOUTH ONCE DAILY.    azithromycin (ZITHROMAX) 500 MG tablet Take 1 tablet (500 mg total) by mouth once daily.    clobetasol (TEMOVATE) 0.05 % cream Apply topically 2 (two)  times daily.    desonide (DESOWEN) 0.05 % cream Apply topically every 24 hours as needed.    diclofenac (VOLTAREN) 75 MG EC tablet TAKE 1 TABLET (75 MG TOTAL) BY MOUTH 2 (TWO) TIMES DAILY.    doxepin (SINEQUAN) 75 MG capsule Take 75 mg by mouth every evening.    famotidine (PEPCID) 20 MG tablet Take 2 tablets (40 mg total) by mouth 2 (two) times daily.    famotidine tablet 40 mg Take 2 tablets (40 mg total) by mouth ED 1 Time.    hydrOXYzine pamoate (VISTARIL) 50 MG Cap Take 1 capsule (50 mg total) by mouth every 6 (six) hours as needed (itch).    ibuprofen (ADVIL,MOTRIN) 200 MG tablet Take 200 mg by mouth as needed for Pain.    lamotrigine (LAMICTAL) 100 MG tablet Take 100 mg by mouth once daily.     levothyroxine (SYNTHROID) 112 MCG tablet TAKE 1 TABLET (112 MCG TOTAL) BY MOUTH ONCE DAILY.    lisinopril 10 MG tablet Take 1 tablet (10 mg total) by mouth once daily.    methylPREDNISolone (MEDROL DOSEPACK) 4 mg tablet As directed    nebivolol (BYSTOLIC) 10 MG Tab Take 1 tablet (10 mg total) by mouth once daily.    quetiapine (SEROQUEL) 400 MG tablet Take 200 mg by mouth nightly.            Clinical Reference Information           Your Vitals Were     Temp                   98.3 °F (36.8 °C) (Oral)           Allergies as of 3/27/2017     No Known Allergies      Immunizations Administered on Date of Encounter - 3/27/2017     None      ED Micro, Lab, POCT     None      ED Imaging Orders     None        Discharge Instructions         Understanding Contact Dermatitis     A cool, moist compress can help reduce itching.     Contact dermatitis is a common type of skin rash. Its caused by something that touches the skin and makes it irritated and inflamed. It can occur on skin on any part of the body, such as the face, neck, hands, arms, and legs. Contact dermatitis is not spread from person to person.  Often, the reaction of contact dermatitis occurs 1 to 2 days after contact with the offending agent.  How to say  it  OSIEL-tact syo-vqy-BI-tis   What causes contact dermatitis?  Its caused by something that irritates the skin, or that creates an allergic reaction on the skin. People can get contact dermatitis from many kinds of things. These include:  · Plant oils in poison ivy, oak, and sumac  · Chemicals in household , solvents, and glue  · Chemicals in makeup, soap, laundry detergent, perfume, acne cream, and hair products  · Certain medicines, such as neomycin, bacitracin, benzocaine, and thimerosal  · Metals such as nickel, found in some jewelry and watch bands   · The sticky material on the back of bandages and tape (adhesive)  · Things that can cause tiny breaks in the skin, such as wood, fiberglass, metal tools, and plant thorns  · Rubber latex in surgical gloves and other medical supplies  Dermatitis can also be caused by the skin being damp for long periods of time. This can happen from washing your hands too often, or working with wet materials.  Symptoms of contact dermatitis  Symptoms can include skin that is:  · Blistered  · Burning  · Cracked  · Dry  · Itchy  · Painful  · Red  · Rough, thickened, and leathery  · Swollen  · Warm  The blisters may ooze fluid and form crusts.  Treatment for contact dermatitis  Treatment is done to help relieve itching and reduce inflammation. The rash should go away in a few days to a few weeks. Treatments include:  · Cool, moist compress. Use a clean damp cloth. Put it on the area for 20 to 30 minutes, 5 to 6 times a day for the first 3 days.  · Steroid cream or ointment. You can apply this medicine several times a day on clean skin.  · Oral corticosteroid. Your healthcare provider may prescribe this medicine if you have severe skin symptoms on a large part of your body.  Your healthcare provider may give you a steroid injection instead of pills.  · Oral antihistamine. This medicine can help reduce itching.  · Colloidal oatmeal bath. Soaking in water with colloidal oatmeal  can help soothe skin.  · Plain cream, lotion, or ointment. Cream, lotion, or ointment without medicine can help to soothe and protect your skin.  Living with contact dermatitis  Talk with your healthcare provider about what may have caused your contact dermatitis. Patch testing may help you figure out what caused the rash so you can avoid further contact with it. Once you learn what caused your rash, make sure to avoid that substance. If your skin comes into contact with it again, make sure to wash your skin right away. If you cant avoid the substance, wear gloves or other protective clothing before you touch it. Or use a cream, lotion, or ointment to protect your skin.  When to call your healthcare provider  Call your healthcare provider right away if you have any of these:  · Fever of 100.4°F (38°C) or higher, or as directed  · Symptoms that dont get better, or get worse  · New symptoms   Date Last Reviewed: 5/1/2016  © 8142-9257 [employer]. [address] [place] [address]. All rights reserved. This information is not intended as a substitute for professional medical care. Always follow your healthcare professional's instructions.          Your Scheduled Appointments     Aug 15, 2017 10:15 AM CDT   Established Patient Visit with Major Kimble MD   [place] - [address] [address] [phone]              Smoking Cessation     If you would like to quit smoking:   You may be eligible for free services if you are a [place] resident and started smoking cigarettes before September 1, 1988.  Call the Smoking Cessation Trust (SCT) toll free at (845) 147-0581 or (832) 056-7579.   Call 7-800-QUIT-NOW if you do not meet the above criteria.             [facility] complies with applicable Federal civil rights laws and does not discriminate on the basis of race, color, national origin, age, disability, or sex.         Language Assistance Services     ATTENTION: Language assistance services are available, free of charge. Please call [phone].      ATENCIÓN: Si habla millyañol, tiene a romo disposición servicios gratuitos de asistencia lingüística. Llame al [phone].     CHÚ Ý: N?u b?n nói Ti?ng Vi?t, có các d?ch v? h? tr? ngôn ng? mi?n phí dành cho b?n. G?i s? [phone].

## 2017-03-27 NOTE — DISCHARGE INSTRUCTIONS
Understanding Contact Dermatitis     A cool, moist compress can help reduce itching.     Contact dermatitis is a common type of skin rash. Its caused by something that touches the skin and makes it irritated and inflamed. It can occur on skin on any part of the body, such as the face, neck, hands, arms, and legs. Contact dermatitis is not spread from person to person.  Often, the reaction of contact dermatitis occurs 1 to 2 days after contact with the offending agent.  How to say it  OSIEL-tact gfj-pit-ES-tis   What causes contact dermatitis?  Its caused by something that irritates the skin, or that creates an allergic reaction on the skin. People can get contact dermatitis from many kinds of things. These include:  · Plant oils in poison ivy, oak, and sumac  · Chemicals in household , solvents, and glue  · Chemicals in makeup, soap, laundry detergent, perfume, acne cream, and hair products  · Certain medicines, such as neomycin, bacitracin, benzocaine, and thimerosal  · Metals such as nickel, found in some jewelry and watch bands   · The sticky material on the back of bandages and tape (adhesive)  · Things that can cause tiny breaks in the skin, such as wood, fiberglass, metal tools, and plant thorns  · Rubber latex in surgical gloves and other medical supplies  Dermatitis can also be caused by the skin being damp for long periods of time. This can happen from washing your hands too often, or working with wet materials.  Symptoms of contact dermatitis  Symptoms can include skin that is:  · Blistered  · Burning  · Cracked  · Dry  · Itchy  · Painful  · Red  · Rough, thickened, and leathery  · Swollen  · Warm  The blisters may ooze fluid and form crusts.  Treatment for contact dermatitis  Treatment is done to help relieve itching and reduce inflammation. The rash should go away in a few days to a few weeks. Treatments include:  · Cool, moist compress. Use a clean damp cloth. Put it on the area for 20 to 30  minutes, 5 to 6 times a day for the first 3 days.  · Steroid cream or ointment. You can apply this medicine several times a day on clean skin.  · Oral corticosteroid. Your healthcare provider may prescribe this medicine if you have severe skin symptoms on a large part of your body.  Your healthcare provider may give you a steroid injection instead of pills.  · Oral antihistamine. This medicine can help reduce itching.  · Colloidal oatmeal bath. Soaking in water with colloidal oatmeal can help soothe skin.  · Plain cream, lotion, or ointment. Cream, lotion, or ointment without medicine can help to soothe and protect your skin.  Living with contact dermatitis  Talk with your healthcare provider about what may have caused your contact dermatitis. Patch testing may help you figure out what caused the rash so you can avoid further contact with it. Once you learn what caused your rash, make sure to avoid that substance. If your skin comes into contact with it again, make sure to wash your skin right away. If you cant avoid the substance, wear gloves or other protective clothing before you touch it. Or use a cream, lotion, or ointment to protect your skin.  When to call your healthcare provider  Call your healthcare provider right away if you have any of these:  · Fever of 100.4°F (38°C) or higher, or as directed  · Symptoms that dont get better, or get worse  · New symptoms   Date Last Reviewed: 5/1/2016  © 2006-4318 [employer]. [address] [place] [address], [place] [address]. All rights reserved. This information is not intended as a substitute for professional medical care. Always follow your healthcare professional's instructions.

## 2017-03-27 NOTE — ED PROVIDER NOTES
[facility]                                        2017                   Chief Complaint  57 y.o. female with Rash (facial rash  possible poison sumac)    History of Present Illness  Gricelda Gil presents to the emergency room with a facial rash this week  Patient was working out ER when she was handling poison sumac last week per daughter  Pt then touched her face after handling the poison sumac with rash occurring afterwards  The patient on exam has a nonspecific dermatitis on the forehead and cheeks, no welts  Patient has no hives, no cellulitis, no fluctuance, no drainage on ER evaluation this p.m.  Patient has no respiratory component, no signs of anaphylaxis, just a facial rash on ROS    The history is provided by the patient    Past Medical History   -- Arthritis    -- Bipolar disorder    -- Depression    -- History of psychiatric care    -- History of psychiatric hospitalization    -- Hyperlipidemia    -- Hypertension    -- Insomnia    -- Lupus    -- Psychiatric problem    -- Self-harming behavior    -- Stroke    -- Suicide attempt    -- Therapy    -- Thyroid disease      Past Surgical History   -- ADENOIDECTOMY     -- CARPAL TUNNEL RELEASE     --  SECTION     -- HYSTERECTOMY     -- NASAL SEPTUM SURGERY     -- Rt shoulder surgery     -- TONSILLECTOMY     -- UVULOPALATOPHARYNGOPLASTY        No Known Allergies     Review of Systems and Physical Exam     Review of Systems  -- Constitution - no fever, denies fatigue, no weakness, no chills  -- Eyes - no tearing or redness, no visual disturbance  -- Ear, Nose - no tinnitus or earache, no nasal congestion or discharge  -- Mouth,Throat - no sore throat, no toothache, normal voice, normal swallowing  -- Respiratory - denies cough and congestion, no shortness of breath, no SCHWARZ  -- Cardiovascular - denies chest pain, no palpitations, denies claudication  -- Gastrointestinal - denies abdominal pain, nausea, vomiting, or  diarrhea  -- Musculoskeletal - denies back pain, negative for myalgias and arthralgias   -- Neurological - no headache, denies weakness or seizure; no LOC  -- Skin - poison sumac exposure last week    Vital Signs   oral temperature is 98.3 °F (36.8 °C).      Physical Exam  -- Nursing note and vitals reviewed  -- Constitutional: Appears well-developed and well-nourished  -- Head: Atraumatic. Normocephalic. No obvious abnormality  -- Eyes: Pupils are equal and reactive to light. Normal conjunctiva and lids  -- Nose: Nose normal in appearance, nares grossly normal. No discharge  -- Throat: Mucous membranes moist, pharynx normal, normal tonsils. No lesions   -- Ears: External ears and TM normal bilaterally. Normal hearing and no drainage  -- Neck: Normal range of motion. Neck supple. No masses, trachea midline  -- Cardiac: Normal rate, regular rhythm and normal heart sounds  -- Pulmonary: Normal respiratory effort, breath sounds clear to auscultation  -- Abdominal: Soft, no tenderness. Normal bowel sounds. Normal liver edge  -- Musculoskeletal: Normal range of motion, no effusions. Joints stable   -- Neurological: No focal deficits. Showed good interaction with staff  -- Vascular: Posterior tibial, dorsalis pedis and radial pulses 2+ bilaterally    -- Lymphatics: No cervical or peripheral lymphadenopathy. No edema noted  -- Skin: Contact dermatitis on the face and cheeks    Emergency Room Course     Medications Given  -- methylPREDNISolone sod suc(PF) (125 mg Intramuscular Given 3/27/17 1755)   -- diphenhydrAMINE injection 50 mg (50 mg Intramuscular Given 3/27/17 1756)   -- famotidine tablet 40 mg (40 mg Oral Given 3/27/17 1756)     Diagnosis  -- The encounter diagnosis was Poison sumac.    Disposition and Plan  -- Disposition: home  -- Condition: stable  -- Follow-up: Patient to follow up with Major Kimble MD in 1-2 days.  -- I advised the patient that we have found no life threatening condition today  -- At this  time, I believe the patient is clinically stable for discharge.   -- The patient acknowledges that close follow up with a MD is required   -- Patient agrees to comply with all instruction and direction given in the ER    This note is dictated on Dragon Natural Speaking word recognition program.  There are word recognition mistakes that are occasionally missed on review.           Michael Stokes MD  03/27/17 6001

## 2017-04-19 ENCOUNTER — LAB VISIT (OUTPATIENT)
Dept: LAB | Facility: HOSPITAL | Age: 57
End: 2017-04-19
Attending: PSYCHIATRY & NEUROLOGY
Payer: MEDICARE

## 2017-04-19 DIAGNOSIS — F31.31 BIPOLAR DISORDER, CURRENT EPISODE DEPRESSED, MILD: Primary | ICD-10-CM

## 2017-04-19 DIAGNOSIS — Z79.899 ON LONG TERM DRUG THERAPY: ICD-10-CM

## 2017-04-19 LAB
ALBUMIN SERPL BCP-MCNC: 3.6 G/DL
ALP SERPL-CCNC: 130 U/L
ALT SERPL W/O P-5'-P-CCNC: 34 U/L
ANION GAP SERPL CALC-SCNC: 8 MMOL/L
AST SERPL-CCNC: 29 U/L
BASOPHILS # BLD AUTO: 0.12 K/UL
BASOPHILS NFR BLD: 1.3 %
BILIRUB SERPL-MCNC: 1 MG/DL
BUN SERPL-MCNC: 12 MG/DL
CALCIUM SERPL-MCNC: 10 MG/DL
CHLORIDE SERPL-SCNC: 104 MMOL/L
CHOLEST/HDLC SERPL: 6.1 {RATIO}
CO2 SERPL-SCNC: 28 MMOL/L
CREAT SERPL-MCNC: 1.1 MG/DL
DIFFERENTIAL METHOD: ABNORMAL
EOSINOPHIL # BLD AUTO: 0.3 K/UL
EOSINOPHIL NFR BLD: 3.5 %
ERYTHROCYTE [DISTWIDTH] IN BLOOD BY AUTOMATED COUNT: 14.2 %
EST. GFR  (AFRICAN AMERICAN): >60 ML/MIN/1.73 M^2
EST. GFR  (NON AFRICAN AMERICAN): 56 ML/MIN/1.73 M^2
GLUCOSE SERPL-MCNC: 102 MG/DL
HCT VFR BLD AUTO: 43.8 %
HDL/CHOLESTEROL RATIO: 16.3 %
HDLC SERPL-MCNC: 239 MG/DL
HDLC SERPL-MCNC: 39 MG/DL
HGB BLD-MCNC: 14.4 G/DL
LDLC SERPL CALC-MCNC: 152.2 MG/DL
LYMPHOCYTES # BLD AUTO: 2.6 K/UL
LYMPHOCYTES NFR BLD: 28.9 %
MCH RBC QN AUTO: 28.6 PG
MCHC RBC AUTO-ENTMCNC: 32.9 %
MCV RBC AUTO: 87 FL
MONOCYTES # BLD AUTO: 1.2 K/UL
MONOCYTES NFR BLD: 12.6 %
NEUTROPHILS # BLD AUTO: 4.9 K/UL
NEUTROPHILS NFR BLD: 53.7 %
NONHDLC SERPL-MCNC: 200 MG/DL
PLATELET # BLD AUTO: 308 K/UL
PMV BLD AUTO: 10 FL
POTASSIUM SERPL-SCNC: 4.6 MMOL/L
PROT SERPL-MCNC: 7.6 G/DL
RBC # BLD AUTO: 5.03 M/UL
SODIUM SERPL-SCNC: 140 MMOL/L
TRIGL SERPL-MCNC: 239 MG/DL
WBC # BLD AUTO: 9.12 K/UL

## 2017-04-19 PROCEDURE — 36415 COLL VENOUS BLD VENIPUNCTURE: CPT

## 2017-04-19 PROCEDURE — 80061 LIPID PANEL: CPT

## 2017-04-19 PROCEDURE — 85025 COMPLETE CBC W/AUTO DIFF WBC: CPT

## 2017-04-19 PROCEDURE — 80053 COMPREHEN METABOLIC PANEL: CPT

## 2017-07-07 ENCOUNTER — OFFICE VISIT (OUTPATIENT)
Dept: FAMILY MEDICINE | Facility: CLINIC | Age: 57
End: 2017-07-07
Payer: MEDICARE

## 2017-07-07 VITALS
WEIGHT: 265 LBS | BODY MASS INDEX: 44.15 KG/M2 | HEIGHT: 65 IN | SYSTOLIC BLOOD PRESSURE: 138 MMHG | RESPIRATION RATE: 16 BRPM | DIASTOLIC BLOOD PRESSURE: 90 MMHG | HEART RATE: 76 BPM

## 2017-07-07 DIAGNOSIS — I10 ESSENTIAL HYPERTENSION: ICD-10-CM

## 2017-07-07 DIAGNOSIS — G25.2 COARSE TREMORS: ICD-10-CM

## 2017-07-07 DIAGNOSIS — R51.9 NEW ONSET OF HEADACHES AFTER AGE 50: Primary | ICD-10-CM

## 2017-07-07 DIAGNOSIS — E03.9 HYPOTHYROIDISM, UNSPECIFIED TYPE: ICD-10-CM

## 2017-07-07 LAB
ALBUMIN SERPL BCP-MCNC: 3.6 G/DL
ALP SERPL-CCNC: 130 U/L
ALT SERPL W/O P-5'-P-CCNC: 21 U/L
ANION GAP SERPL CALC-SCNC: 8 MMOL/L
AST SERPL-CCNC: 27 U/L
BASOPHILS # BLD AUTO: 0.08 K/UL
BASOPHILS NFR BLD: 1.1 %
BILIRUB SERPL-MCNC: 0.7 MG/DL
BUN SERPL-MCNC: 18 MG/DL
CALCIUM SERPL-MCNC: 9.8 MG/DL
CHLORIDE SERPL-SCNC: 105 MMOL/L
CO2 SERPL-SCNC: 30 MMOL/L
CREAT SERPL-MCNC: 1.3 MG/DL
DIFFERENTIAL METHOD: ABNORMAL
EOSINOPHIL # BLD AUTO: 0.3 K/UL
EOSINOPHIL NFR BLD: 3.4 %
ERYTHROCYTE [DISTWIDTH] IN BLOOD BY AUTOMATED COUNT: 14.5 %
EST. GFR  (AFRICAN AMERICAN): 53 ML/MIN/1.73 M^2
EST. GFR  (NON AFRICAN AMERICAN): 46 ML/MIN/1.73 M^2
GLUCOSE SERPL-MCNC: 121 MG/DL
HCT VFR BLD AUTO: 41.9 %
HGB BLD-MCNC: 13.3 G/DL
LYMPHOCYTES # BLD AUTO: 2.2 K/UL
LYMPHOCYTES NFR BLD: 30.1 %
MCH RBC QN AUTO: 28.7 PG
MCHC RBC AUTO-ENTMCNC: 31.7 %
MCV RBC AUTO: 90 FL
MONOCYTES # BLD AUTO: 0.8 K/UL
MONOCYTES NFR BLD: 10.9 %
NEUTROPHILS # BLD AUTO: 4.1 K/UL
NEUTROPHILS NFR BLD: 54.5 %
PLATELET # BLD AUTO: 277 K/UL
PMV BLD AUTO: 11.6 FL
POTASSIUM SERPL-SCNC: 4.5 MMOL/L
PROT SERPL-MCNC: 7.4 G/DL
RBC # BLD AUTO: 4.64 M/UL
SODIUM SERPL-SCNC: 143 MMOL/L
T4 FREE SERPL-MCNC: 0.9 NG/DL
TSH SERPL DL<=0.005 MIU/L-ACNC: 2.13 UIU/ML
WBC # BLD AUTO: 7.44 K/UL

## 2017-07-07 PROCEDURE — 36415 COLL VENOUS BLD VENIPUNCTURE: CPT | Mod: PBBFAC

## 2017-07-07 PROCEDURE — 84439 ASSAY OF FREE THYROXINE: CPT

## 2017-07-07 PROCEDURE — 99214 OFFICE O/P EST MOD 30 MIN: CPT | Mod: PBBFAC | Performed by: NURSE PRACTITIONER

## 2017-07-07 PROCEDURE — 84443 ASSAY THYROID STIM HORMONE: CPT

## 2017-07-07 PROCEDURE — 99214 OFFICE O/P EST MOD 30 MIN: CPT | Mod: S$PBB | Performed by: NURSE PRACTITIONER

## 2017-07-07 PROCEDURE — 80053 COMPREHEN METABOLIC PANEL: CPT

## 2017-07-07 PROCEDURE — 99999 PR PBB SHADOW E&M-EST. PATIENT-LVL IV: CPT | Mod: PBBFAC,,, | Performed by: NURSE PRACTITIONER

## 2017-07-07 PROCEDURE — 84481 FREE ASSAY (FT-3): CPT

## 2017-07-07 PROCEDURE — 85025 COMPLETE CBC W/AUTO DIFF WBC: CPT

## 2017-07-07 RX ORDER — ZALEPLON 10 MG/1
10 CAPSULE ORAL NIGHTLY
COMMUNITY
End: 2019-05-07

## 2017-07-07 RX ORDER — NEBIVOLOL 20 MG/1
20 TABLET ORAL DAILY
Qty: 30 TABLET | Refills: 5 | Status: SHIPPED | OUTPATIENT
Start: 2017-07-07 | End: 2018-05-23

## 2017-07-07 NOTE — PROGRESS NOTES
Subjective:       Patient ID: Gricelda Gil is a 57 y.o. female.    Chief Complaint: Headache; Tremors; and Obesity    Started with headaches 2 weeks ago - new sensation and unilateral. Tremors started a few months ago - getting worse and worse with intended motion.      Headache    This is a new problem. Episode onset: 2 weeks ago. The pain is located in the right unilateral region. Radiates to: right jaw. The pain quality is not similar to prior headaches. The quality of the pain is described as sharp and aching. The pain is at a severity of 8/10. The pain is moderate. Associated symptoms include blurred vision (right eye at times), neck pain and photophobia. Pertinent negatives include no abdominal pain, coughing, dizziness, ear pain, fever, hearing loss, nausea, phonophobia, rhinorrhea, sore throat, vomiting or weakness. Nothing aggravates the symptoms. She has tried NSAIDs (aspirin) for the symptoms. The treatment provided significant relief. Her past medical history is significant for hypertension and obesity.     Review of Systems   Constitutional: Negative.  Negative for activity change, appetite change, fatigue, fever and unexpected weight change.   HENT: Negative for congestion, ear pain, hearing loss, rhinorrhea, sore throat and trouble swallowing.    Eyes: Positive for blurred vision (right eye at times), photophobia and visual disturbance. Negative for discharge.   Respiratory: Negative.  Negative for cough, chest tightness, shortness of breath and wheezing.    Cardiovascular: Negative.  Negative for chest pain and palpitations.   Gastrointestinal: Negative.  Negative for abdominal pain, blood in stool, constipation, diarrhea, nausea and vomiting.   Endocrine: Negative.  Negative for polydipsia and polyuria.   Genitourinary: Negative.  Negative for difficulty urinating, dysuria, hematuria, menstrual problem and urgency.   Musculoskeletal: Positive for arthralgias and neck pain. Negative for  joint swelling and myalgias.   Skin: Negative.  Negative for color change.   Allergic/Immunologic: Negative.    Neurological: Positive for tremors and headaches. Negative for dizziness and weakness.   Hematological: Negative.    Psychiatric/Behavioral: Negative.  Negative for confusion, dysphoric mood and sleep disturbance.   All other systems reviewed and are negative.      Objective:      Physical Exam   Constitutional: She is oriented to person, place, and time. She appears well-developed and well-nourished. No distress.   HENT:   Head: Normocephalic and atraumatic.   Eyes: Pupils are equal, round, and reactive to light.   Neck: Normal range of motion. Neck supple.   Cardiovascular: Normal rate and regular rhythm.    No murmur heard.  Pulmonary/Chest: Effort normal and breath sounds normal. No respiratory distress.   Musculoskeletal: Normal range of motion.   Neurological: She is alert and oriented to person, place, and time.   Hand tremors   Skin: Skin is warm and dry.   Psychiatric: She has a normal mood and affect.   Nursing note and vitals reviewed.      Assessment:       1. New onset of headaches after age 50    2. Coarse tremors    3. Essential hypertension    4. Hypothyroidism, unspecified type        Plan:   Gricelda was seen today for headache, tremors and obesity.    Diagnoses and all orders for this visit:    New onset of headaches after age 50  -     CT Head Without Contrast; Future  -     CBC auto differential  -     Comprehensive metabolic panel    Coarse tremors  -     CT Head Without Contrast; Future  -     CBC auto differential  -     Comprehensive metabolic panel  -     TSH  -     T4, free  -     T3, free    Essential hypertension  -     nebivolol 20 mg Tab; Take 20 mg by mouth once daily.  -     CBC auto differential  -     Comprehensive metabolic panel    Hypothyroidism, unspecified type  -     TSH  -     T4, free  -     T3, free    RTC Wednesday for recheck with Dr. Kimble

## 2017-07-08 LAB — T3FREE SERPL-MCNC: 2.1 PG/ML

## 2017-07-11 ENCOUNTER — HOSPITAL ENCOUNTER (OUTPATIENT)
Dept: RADIOLOGY | Facility: HOSPITAL | Age: 57
Discharge: HOME | End: 2017-07-11
Attending: NURSE PRACTITIONER
Payer: MEDICARE

## 2017-07-11 DIAGNOSIS — G25.2 COARSE TREMORS: ICD-10-CM

## 2017-07-11 DIAGNOSIS — R51.9 NEW ONSET OF HEADACHES AFTER AGE 50: ICD-10-CM

## 2017-07-11 PROCEDURE — 70450 CT HEAD/BRAIN W/O DYE: CPT | Mod: TC

## 2017-07-11 PROCEDURE — 70450 CT HEAD/BRAIN W/O DYE: CPT | Mod: 26,,, | Performed by: RADIOLOGY

## 2017-07-17 DIAGNOSIS — J32.9 SINUSITIS, UNSPECIFIED CHRONICITY, UNSPECIFIED LOCATION: ICD-10-CM

## 2017-07-17 RX ORDER — AZITHROMYCIN 500 MG/1
500 TABLET, FILM COATED ORAL DAILY
Qty: 3 TABLET | Refills: 0 | Status: SHIPPED | OUTPATIENT
Start: 2017-07-17 | End: 2017-07-20

## 2017-07-31 DIAGNOSIS — E03.4 HYPOTHYROIDISM DUE TO ACQUIRED ATROPHY OF THYROID: ICD-10-CM

## 2017-08-01 RX ORDER — LEVOTHYROXINE SODIUM 112 UG/1
TABLET ORAL
Qty: 30 TABLET | Refills: 1 | Status: SHIPPED | OUTPATIENT
Start: 2017-08-01 | End: 2017-08-31 | Stop reason: SDUPTHER

## 2017-08-18 ENCOUNTER — OFFICE VISIT (OUTPATIENT)
Dept: FAMILY MEDICINE | Facility: CLINIC | Age: 57
End: 2017-08-18
Payer: MEDICARE

## 2017-08-18 VITALS
DIASTOLIC BLOOD PRESSURE: 90 MMHG | SYSTOLIC BLOOD PRESSURE: 114 MMHG | BODY MASS INDEX: 43.79 KG/M2 | RESPIRATION RATE: 18 BRPM | WEIGHT: 262.81 LBS | HEART RATE: 74 BPM | HEIGHT: 65 IN

## 2017-08-18 DIAGNOSIS — E66.01 MORBID OBESITY DUE TO EXCESS CALORIES: ICD-10-CM

## 2017-08-18 DIAGNOSIS — L25.9 CONTACT DERMATITIS, UNSPECIFIED CONTACT DERMATITIS TYPE, UNSPECIFIED TRIGGER: ICD-10-CM

## 2017-08-18 DIAGNOSIS — M47.816 LUMBAR FACET ARTHROPATHY: ICD-10-CM

## 2017-08-18 DIAGNOSIS — G47.00 INSOMNIA, UNSPECIFIED TYPE: ICD-10-CM

## 2017-08-18 DIAGNOSIS — M47.812 FACET ARTHROPATHY, CERVICAL: ICD-10-CM

## 2017-08-18 DIAGNOSIS — E03.9 HYPOTHYROIDISM, UNSPECIFIED TYPE: ICD-10-CM

## 2017-08-18 DIAGNOSIS — E78.5 DYSLIPIDEMIA: ICD-10-CM

## 2017-08-18 DIAGNOSIS — I10 ESSENTIAL HYPERTENSION: Primary | ICD-10-CM

## 2017-08-18 PROCEDURE — 99999 PR STA SHADOW: CPT | Mod: PBBFAC,,, | Performed by: FAMILY MEDICINE

## 2017-08-18 PROCEDURE — 99999 PR PBB SHADOW E&M-EST. PATIENT-LVL II: CPT | Mod: PBBFAC,,, | Performed by: FAMILY MEDICINE

## 2017-08-18 PROCEDURE — 99214 OFFICE O/P EST MOD 30 MIN: CPT | Mod: S$PBB | Performed by: FAMILY MEDICINE

## 2017-08-18 PROCEDURE — 99212 OFFICE O/P EST SF 10 MIN: CPT | Mod: PBBFAC | Performed by: FAMILY MEDICINE

## 2017-08-18 RX ORDER — LISINOPRIL 20 MG/1
20 TABLET ORAL DAILY
Qty: 30 TABLET | Refills: 0 | Status: SHIPPED | OUTPATIENT
Start: 2017-08-18 | End: 2017-08-31 | Stop reason: SDUPTHER

## 2017-08-18 NOTE — PROGRESS NOTES
Subjective:       Patient ID: Gricelda Gil is a 57 y.o. female.    Chief Complaint: Follow-up (6 mo)    Pt is a 57 y.o. female who presents for evaluation and management of   Encounter Diagnoses   Name Primary?    Essential hypertension Yes    Hypothyroidism, unspecified type     Insomnia, unspecified type     Lumbar facet arthropathy     Facet arthropathy, cervical     Morbid obesity due to excess calories    .  Doing well on current meds. Denies any side effects. Prevention is up to date.  Review of Systems   Constitutional: Negative for chills and fever.   Respiratory: Negative for shortness of breath.    Cardiovascular: Negative for chest pain and palpitations.   Gastrointestinal: Negative for abdominal pain, blood in stool, constipation and nausea.   Genitourinary: Negative for difficulty urinating.   Psychiatric/Behavioral: Negative for dysphoric mood, sleep disturbance and suicidal ideas. The patient is not nervous/anxious.        Objective:      Physical Exam   Constitutional: She is oriented to person, place, and time. She appears well-developed and well-nourished.   HENT:   Head: Normocephalic and atraumatic.   Right Ear: External ear normal.   Left Ear: External ear normal.   Nose: Nose normal.   Mouth/Throat: Oropharynx is clear and moist.   Eyes: EOM are normal. Pupils are equal, round, and reactive to light.   Neck: Normal range of motion. Neck supple. No JVD present. No tracheal deviation present. No thyromegaly present.   Cardiovascular: Normal rate, normal heart sounds and intact distal pulses.    No murmur heard.  Pulmonary/Chest: Effort normal and breath sounds normal. No respiratory distress. She has no wheezes. She has no rales. She exhibits no tenderness.   Abdominal: Soft. Bowel sounds are normal. She exhibits no distension and no mass. There is no tenderness. There is no rebound and no guarding.   Musculoskeletal: Normal range of motion. She exhibits no edema or tenderness.    Lymphadenopathy:     She has no cervical adenopathy.   Neurological: She is alert and oriented to person, place, and time. She has normal reflexes. She displays normal reflexes. No cranial nerve deficit. She exhibits normal muscle tone. Coordination normal.   Skin: Skin is warm and dry. No rash noted. No erythema. No pallor.   Psychiatric: She has a normal mood and affect. Her behavior is normal. Judgment and thought content normal.       Assessment:       1. Essential hypertension    2. Hypothyroidism, unspecified type    3. Insomnia, unspecified type    4. Lumbar facet arthropathy    5. Facet arthropathy, cervical    6. Morbid obesity due to excess calories    7. Dyslipidemia    8. Contact dermatitis, unspecified contact dermatitis type, unspecified trigger        Plan:   Gricelda was seen today for follow-up.    Diagnoses and all orders for this visit:    Essential hypertension    Hypothyroidism, unspecified type    Insomnia, unspecified type    Lumbar facet arthropathy    Facet arthropathy, cervical    Morbid obesity due to excess calories    Dyslipidemia    Contact dermatitis, unspecified contact dermatitis type, unspecified trigger  -     lisinopril (PRINIVIL,ZESTRIL) 20 MG tablet; Take 1 tablet (20 mg total) by mouth once daily.      +++  continue same except increasing her ACE     The patient is asked to make an attempt to improve diet and exercise patterns to aid in medical management of this problem.  5 small meals a day. Cut out white carbs: bread, rice, pasta, potatoes. Exercise/walk 5x/week for at least 30 minutes  .    RTC 2 Weeks for BP       No Follow-up on file.

## 2017-08-31 ENCOUNTER — OFFICE VISIT (OUTPATIENT)
Dept: FAMILY MEDICINE | Facility: CLINIC | Age: 57
End: 2017-08-31
Payer: MEDICARE

## 2017-08-31 VITALS
BODY MASS INDEX: 43.71 KG/M2 | DIASTOLIC BLOOD PRESSURE: 80 MMHG | HEART RATE: 72 BPM | WEIGHT: 262.38 LBS | RESPIRATION RATE: 20 BRPM | HEIGHT: 65 IN | SYSTOLIC BLOOD PRESSURE: 120 MMHG

## 2017-08-31 DIAGNOSIS — I10 ESSENTIAL HYPERTENSION: Primary | ICD-10-CM

## 2017-08-31 DIAGNOSIS — L25.9 CONTACT DERMATITIS, UNSPECIFIED CONTACT DERMATITIS TYPE, UNSPECIFIED TRIGGER: ICD-10-CM

## 2017-08-31 PROCEDURE — 99212 OFFICE O/P EST SF 10 MIN: CPT | Mod: PBBFAC | Performed by: FAMILY MEDICINE

## 2017-08-31 PROCEDURE — 99999 PR PBB SHADOW E&M-EST. PATIENT-LVL II: CPT | Mod: PBBFAC,,, | Performed by: FAMILY MEDICINE

## 2017-08-31 PROCEDURE — 99999 PR STA SHADOW: CPT | Mod: PBBFAC,,, | Performed by: FAMILY MEDICINE

## 2017-08-31 PROCEDURE — 99213 OFFICE O/P EST LOW 20 MIN: CPT | Mod: S$PBB | Performed by: FAMILY MEDICINE

## 2017-08-31 RX ORDER — LISINOPRIL 20 MG/1
20 TABLET ORAL DAILY
Qty: 30 TABLET | Refills: 5 | Status: SHIPPED | OUTPATIENT
Start: 2017-08-31 | End: 2018-05-07

## 2017-08-31 NOTE — PROGRESS NOTES
Subjective:       Patient ID: Gricelda Gil is a 57 y.o. female.    Chief Complaint: Follow-up (2 wk )    Pt is a 57 y.o. female who presents for evaluation and management of   Encounter Diagnoses   Name Primary?    Essential hypertension Yes    Contact dermatitis, unspecified contact dermatitis type, unspecified trigger    .  Doing well on current meds. Denies any side effects. Prevention is up to date.    Review of Systems   Constitutional: Negative for activity change and unexpected weight change.   HENT: Negative for hearing loss, rhinorrhea and trouble swallowing.    Eyes: Negative for discharge and visual disturbance.   Respiratory: Negative for chest tightness and wheezing.    Cardiovascular: Negative for chest pain and palpitations.   Gastrointestinal: Negative for blood in stool, constipation, diarrhea and vomiting.   Endocrine: Negative for polydipsia and polyuria.   Genitourinary: Negative for difficulty urinating, dysuria, hematuria and menstrual problem.   Musculoskeletal: Positive for arthralgias. Negative for joint swelling and neck pain.   Neurological: Negative for weakness and headaches.   Psychiatric/Behavioral: Positive for dysphoric mood. Negative for confusion.       Objective:      Physical Exam   Constitutional: She is oriented to person, place, and time. She appears well-developed and well-nourished.   HENT:   Head: Normocephalic and atraumatic.   Right Ear: External ear normal.   Left Ear: External ear normal.   Nose: Nose normal.   Mouth/Throat: Oropharynx is clear and moist.   Eyes: EOM are normal. Pupils are equal, round, and reactive to light.   Neck: Normal range of motion. Neck supple. No JVD present. No tracheal deviation present. No thyromegaly present.   Cardiovascular: Normal rate, normal heart sounds and intact distal pulses.    No murmur heard.  Pulmonary/Chest: Effort normal and breath sounds normal. No respiratory distress. She has no wheezes. She has no rales. She  exhibits no tenderness.   Abdominal: Soft. Bowel sounds are normal. She exhibits no distension and no mass. There is no tenderness. There is no rebound and no guarding.   Musculoskeletal: Normal range of motion. She exhibits no edema or tenderness.   Lymphadenopathy:     She has no cervical adenopathy.   Neurological: She is alert and oriented to person, place, and time. She has normal reflexes. She displays normal reflexes. No cranial nerve deficit. She exhibits normal muscle tone. Coordination normal.   Skin: Skin is warm and dry. No rash noted. No erythema. No pallor.   Psychiatric: She has a normal mood and affect. Her behavior is normal. Judgment and thought content normal.       Assessment:       1. Essential hypertension    2. Contact dermatitis, unspecified contact dermatitis type, unspecified trigger        Plan:   Gricelda was seen today for follow-up.    Diagnoses and all orders for this visit:    Essential hypertension  -     Basic metabolic panel; Future    Contact dermatitis, unspecified contact dermatitis type, unspecified trigger  -     lisinopril (PRINIVIL,ZESTRIL) 20 MG tablet; Take 1 tablet (20 mg total) by mouth once daily.      No Follow-up on file.

## 2017-10-18 ENCOUNTER — OFFICE VISIT (OUTPATIENT)
Dept: FAMILY MEDICINE | Facility: CLINIC | Age: 57
End: 2017-10-18
Payer: MEDICARE

## 2017-10-18 VITALS
SYSTOLIC BLOOD PRESSURE: 110 MMHG | TEMPERATURE: 99 F | WEIGHT: 256.38 LBS | HEIGHT: 68 IN | RESPIRATION RATE: 16 BRPM | HEART RATE: 60 BPM | BODY MASS INDEX: 38.86 KG/M2 | DIASTOLIC BLOOD PRESSURE: 70 MMHG

## 2017-10-18 DIAGNOSIS — M26.609 TMJ DYSFUNCTION: Primary | ICD-10-CM

## 2017-10-18 DIAGNOSIS — K59.09 CHRONIC CONSTIPATION: ICD-10-CM

## 2017-10-18 PROCEDURE — 99999 PR PBB SHADOW E&M-EST. PATIENT-LVL III: CPT | Mod: PBBFAC,,, | Performed by: NURSE PRACTITIONER

## 2017-10-18 PROCEDURE — 99999 PR STA SHADOW: CPT | Mod: S$PBB,PBBFAC,, | Performed by: NURSE PRACTITIONER

## 2017-10-18 PROCEDURE — 99213 OFFICE O/P EST LOW 20 MIN: CPT | Mod: S$PBB | Performed by: NURSE PRACTITIONER

## 2017-10-18 PROCEDURE — 99213 OFFICE O/P EST LOW 20 MIN: CPT | Mod: PBBFAC | Performed by: NURSE PRACTITIONER

## 2017-10-18 RX ORDER — NAPROXEN SODIUM 550 MG/1
550 TABLET ORAL 2 TIMES DAILY WITH MEALS
Qty: 60 TABLET | Refills: 1 | Status: SHIPPED | OUTPATIENT
Start: 2017-10-18 | End: 2017-12-12 | Stop reason: SDUPTHER

## 2017-10-18 RX ORDER — TIZANIDINE 4 MG/1
4 TABLET ORAL NIGHTLY
Qty: 30 TABLET | Refills: 5 | Status: SHIPPED | OUTPATIENT
Start: 2017-10-18 | End: 2018-04-04 | Stop reason: SDUPTHER

## 2017-10-18 RX ORDER — LUBIPROSTONE 24 UG/1
24 CAPSULE ORAL 2 TIMES DAILY WITH MEALS
Qty: 60 CAPSULE | Refills: 5 | Status: SHIPPED | OUTPATIENT
Start: 2017-10-18 | End: 2018-08-09

## 2017-10-18 NOTE — PROGRESS NOTES
Subjective:       Patient ID: Gricelda Gil is a 57 y.o. female.    Chief Complaint: Shortness of Breath; Chest Pain (chest heaviness); and Jaw Pain (right side)    Jaw pain at the right for 4 months. Clicks and grinds at times. Chest pain and SOB with some anxiety.      Review of Systems   Constitutional: Negative.  Negative for activity change, appetite change, fatigue, fever and unexpected weight change.   HENT: Negative.  Negative for congestion, ear pain, hearing loss, rhinorrhea, sore throat and trouble swallowing.    Eyes: Positive for discharge (at times - has appointment with eye doctor). Negative for visual disturbance.   Respiratory: Negative.  Negative for cough, chest tightness, shortness of breath and wheezing.    Cardiovascular: Negative.  Negative for palpitations.   Gastrointestinal: Positive for constipation (hard stool Q5-7 days). Negative for abdominal pain, blood in stool, diarrhea, nausea and vomiting.   Endocrine: Negative.  Negative for polydipsia and polyuria.   Genitourinary: Negative.  Negative for difficulty urinating, dysuria, hematuria, menstrual problem and urgency.   Musculoskeletal: Positive for arthralgias (right jaw), joint swelling and neck pain. Negative for myalgias.   Skin: Negative.  Negative for color change.   Allergic/Immunologic: Negative.    Neurological: Negative.  Negative for dizziness, weakness and headaches.   Hematological: Negative.    Psychiatric/Behavioral: Negative.  Negative for confusion, dysphoric mood and sleep disturbance.   All other systems reviewed and are negative.      Objective:      Physical Exam   Constitutional: She is oriented to person, place, and time. She appears well-developed and well-nourished. No distress.   HENT:   Head: Normocephalic and atraumatic.   Eyes: Pupils are equal, round, and reactive to light.   Neck: Normal range of motion. Neck supple.   Cardiovascular: Normal rate and regular rhythm.    No murmur  heard.  Pulmonary/Chest: Effort normal and breath sounds normal. No respiratory distress.   Neurological: She is alert and oriented to person, place, and time.   Skin: Skin is warm and dry.   Psychiatric: She has a normal mood and affect.   Nursing note and vitals reviewed.      Assessment:       1. TMJ dysfunction    2. Chronic constipation        Plan:   Gricelda was seen today for shortness of breath, chest pain and jaw pain.    Diagnoses and all orders for this visit:    TMJ dysfunction  -     naproxen sodium (ANAPROX) 550 MG tablet; Take 1 tablet (550 mg total) by mouth 2 (two) times daily with meals.  -     tizanidine (ZANAFLEX) 4 MG tablet; Take 1 tablet (4 mg total) by mouth every evening.    Chronic constipation  -     lubiprostone (AMITIZA) 24 MCG Cap; Take 1 capsule (24 mcg total) by mouth 2 (two) times daily with meals.    Return to clinic in 3 weeks for recheck

## 2017-10-19 ENCOUNTER — TELEPHONE (OUTPATIENT)
Dept: FAMILY MEDICINE | Facility: CLINIC | Age: 57
End: 2017-10-19

## 2017-10-19 NOTE — TELEPHONE ENCOUNTER
----- Message from Ángela Spann sent at 10/19/2017 11:27 AM CDT -----  Contact: Self  Gricelda Gil  MRN: [id number]  : 1960  PCP: Major Kimble  Home Phone      [phone]  Work Phone      Not on file.  Mobile          [phone]    MESSAGE:   Was seen yesterday for constipation and given RX lubiprostone (AMITIZA) 24 MCG Cap. She is stating that she can not take this because it's giving her liquid diarrhea.  Would like something else called in.    Pharmacy: [facility] in [place]    Phone:  [phone]

## 2017-11-08 ENCOUNTER — OFFICE VISIT (OUTPATIENT)
Dept: FAMILY MEDICINE | Facility: CLINIC | Age: 57
End: 2017-11-08
Payer: MEDICARE

## 2017-11-08 VITALS
BODY MASS INDEX: 42.99 KG/M2 | SYSTOLIC BLOOD PRESSURE: 118 MMHG | WEIGHT: 258 LBS | DIASTOLIC BLOOD PRESSURE: 72 MMHG | HEART RATE: 84 BPM | HEIGHT: 65 IN

## 2017-11-08 DIAGNOSIS — F41.9 ANXIETY: ICD-10-CM

## 2017-11-08 DIAGNOSIS — M26.609 TMJ DYSFUNCTION: Primary | ICD-10-CM

## 2017-11-08 PROCEDURE — 99999 PR STA SHADOW: CPT | Mod: PBBFAC,,, | Performed by: NURSE PRACTITIONER

## 2017-11-08 PROCEDURE — 99213 OFFICE O/P EST LOW 20 MIN: CPT | Mod: PBBFAC | Performed by: NURSE PRACTITIONER

## 2017-11-08 PROCEDURE — 99212 OFFICE O/P EST SF 10 MIN: CPT | Mod: S$PBB | Performed by: NURSE PRACTITIONER

## 2017-11-08 PROCEDURE — 99999 PR PBB SHADOW E&M-EST. PATIENT-LVL III: CPT | Mod: PBBFAC,,, | Performed by: NURSE PRACTITIONER

## 2017-11-08 NOTE — PROGRESS NOTES
Subjective:       Patient ID: Gricelda Gil is a 57 y.o. female.    Chief Complaint: Follow-up    Here for recheck of jaw pain and anxiety. Jaw pain better and anxiety okay.      Review of Systems   Constitutional: Negative.  Negative for activity change, appetite change, fatigue, fever and unexpected weight change.   HENT: Negative.  Negative for congestion, ear pain, hearing loss, rhinorrhea, sore throat and trouble swallowing.    Eyes: Negative for discharge (Saw eye doctor yesterday) and visual disturbance.   Respiratory: Negative.  Negative for cough, chest tightness, shortness of breath and wheezing.    Cardiovascular: Negative.  Negative for chest pain and palpitations.   Gastrointestinal: Negative for abdominal pain, blood in stool, constipation, diarrhea, nausea and vomiting.   Endocrine: Negative.  Negative for polydipsia and polyuria.   Genitourinary: Negative.  Negative for difficulty urinating, dysuria, hematuria, menstrual problem and urgency.   Musculoskeletal: Positive for arthralgias (right jaw better). Negative for joint swelling, myalgias and neck pain.   Skin: Negative.  Negative for color change.   Allergic/Immunologic: Negative.    Neurological: Negative.  Negative for dizziness, weakness and headaches.   Hematological: Negative.    Psychiatric/Behavioral: Negative.  Negative for confusion, dysphoric mood and sleep disturbance.   All other systems reviewed and are negative.      Objective:      Physical Exam   Constitutional: She is oriented to person, place, and time. She appears well-developed and well-nourished. No distress.   HENT:   Head: Normocephalic and atraumatic.   Eyes: Pupils are equal, round, and reactive to light.   Neck: Normal range of motion. Neck supple.   Cardiovascular: Normal rate and regular rhythm.    No murmur heard.  Pulmonary/Chest: Effort normal and breath sounds normal. No respiratory distress.   Neurological: She is alert and oriented to person, place, and  time.   Skin: Skin is warm and dry.   Psychiatric: She has a normal mood and affect.   Nursing note and vitals reviewed.      Assessment:       1. TMJ dysfunction    2. Anxiety        Plan:   Gricelda was seen today for shortness of breath, chest pain and jaw pain.    Diagnoses and all orders for this visit:    TMJ dysfunction  -     Continue naproxen sodium (ANAPROX) 550 MG tablet; Take 1 tablet (550 mg total) by mouth 2 (two) times daily with meals.  -     Continue tizanidine (ZANAFLEX) 4 MG tablet; Take 1 tablet (4 mg total) by mouth every evening.    RTC 6 months for recheck

## 2017-11-21 DIAGNOSIS — E03.4 HYPOTHYROIDISM DUE TO ACQUIRED ATROPHY OF THYROID: ICD-10-CM

## 2017-11-21 RX ORDER — LEVOTHYROXINE SODIUM 112 UG/1
TABLET ORAL
Qty: 30 TABLET | Refills: 5 | Status: SHIPPED | OUTPATIENT
Start: 2017-11-21 | End: 2018-12-07 | Stop reason: SDUPTHER

## 2017-12-12 DIAGNOSIS — M26.609 TMJ DYSFUNCTION: ICD-10-CM

## 2017-12-13 RX ORDER — NAPROXEN SODIUM 550 MG/1
550 TABLET ORAL 2 TIMES DAILY WITH MEALS
Qty: 60 TABLET | Refills: 1 | Status: SHIPPED | OUTPATIENT
Start: 2017-12-13 | End: 2018-12-21

## 2018-03-20 ENCOUNTER — OFFICE VISIT (OUTPATIENT)
Dept: FAMILY MEDICINE | Facility: CLINIC | Age: 58
End: 2018-03-20
Payer: MEDICARE

## 2018-03-20 ENCOUNTER — APPOINTMENT (OUTPATIENT)
Dept: RADIOLOGY | Facility: CLINIC | Age: 58
End: 2018-03-20
Attending: FAMILY MEDICINE
Payer: MEDICARE

## 2018-03-20 VITALS
BODY MASS INDEX: 42.24 KG/M2 | HEIGHT: 65 IN | DIASTOLIC BLOOD PRESSURE: 80 MMHG | RESPIRATION RATE: 16 BRPM | HEART RATE: 84 BPM | TEMPERATURE: 98 F | SYSTOLIC BLOOD PRESSURE: 116 MMHG | WEIGHT: 253.5 LBS

## 2018-03-20 DIAGNOSIS — R50.9 FEVER, UNSPECIFIED FEVER CAUSE: ICD-10-CM

## 2018-03-20 DIAGNOSIS — R50.9 FEVER, UNSPECIFIED FEVER CAUSE: Primary | ICD-10-CM

## 2018-03-20 LAB
BACTERIA SPEC CULT: ABNORMAL
BILIRUB SERPL-MCNC: 0 MG/DL
BLOOD URINE, POC: 0
CASTS: 0
COLOR, POC UA: NORMAL
CRYSTALS: ABNORMAL
GLUCOSE UR QL STRIP: 0
KETONES UR QL STRIP: 0
LEUKOCYTE ESTERASE URINE, POC: NORMAL
NITRITE, POC UA: 0
PH, POC UA: 5
PROTEIN, POC: 0
RBC CELLS COUNTED: 0
SPECIFIC GRAVITY, POC UA: 1.01
UROBILINOGEN, POC UA: 0
WHITE BLOOD CELLS: 0

## 2018-03-20 PROCEDURE — 81000 URINALYSIS NONAUTO W/SCOPE: CPT | Mod: PBBFAC | Performed by: FAMILY MEDICINE

## 2018-03-20 PROCEDURE — 99213 OFFICE O/P EST LOW 20 MIN: CPT | Mod: PBBFAC,25 | Performed by: FAMILY MEDICINE

## 2018-03-20 PROCEDURE — 71046 X-RAY EXAM CHEST 2 VIEWS: CPT | Mod: 26,,, | Performed by: RADIOLOGY

## 2018-03-20 PROCEDURE — 99214 OFFICE O/P EST MOD 30 MIN: CPT | Mod: S$PBB | Performed by: FAMILY MEDICINE

## 2018-03-20 PROCEDURE — 99999 PR PBB SHADOW E&M-EST. PATIENT-LVL III: CPT | Mod: PBBFAC,,, | Performed by: FAMILY MEDICINE

## 2018-03-20 PROCEDURE — 99999 PR STA SHADOW: CPT | Mod: PBBFAC,,, | Performed by: FAMILY MEDICINE

## 2018-03-20 PROCEDURE — 99999 POCT URINE SEDIMENT EXAM: CPT | Mod: PBBFAC,,, | Performed by: FAMILY MEDICINE

## 2018-03-20 PROCEDURE — 99999 POCT URINALYSIS, DIPSTICK OR TABLET REAGENT, AUTOMATED, WITH MICROSCOP: CPT | Mod: PBBFAC,,, | Performed by: FAMILY MEDICINE

## 2018-03-20 PROCEDURE — 71046 X-RAY EXAM CHEST 2 VIEWS: CPT | Mod: TC,PO

## 2018-03-20 PROCEDURE — 81001 URINALYSIS AUTO W/SCOPE: CPT | Mod: PBBFAC | Performed by: FAMILY MEDICINE

## 2018-03-20 RX ORDER — DICLOFENAC SODIUM 75 MG/1
TABLET, DELAYED RELEASE ORAL
Refills: 1 | COMMUNITY
Start: 2018-01-12 | End: 2018-11-23 | Stop reason: SDUPTHER

## 2018-03-20 NOTE — PROGRESS NOTES
Subjective:       Patient ID: Gricelda Gil is a 58 y.o. female.    Chief Complaint: Fever    Pt is a 58 y.o. female who presents for evaluation and management of   Encounter Diagnosis   Name Primary?    Fever, unspecified fever cause Yes   2 week   Up to 101     Doing well on current meds. Denies any side effects. Prevention is up to date.    Review of Systems   Constitutional: Positive for fever.   HENT: Negative for congestion, ear pain and sore throat.    Respiratory: Negative for cough and wheezing.    Cardiovascular: Negative for chest pain.   Gastrointestinal: Negative for abdominal pain, diarrhea, nausea and vomiting.   Genitourinary: Negative for dysuria.   Skin: Negative for rash and wound.   Neurological: Negative for headaches.       Objective:      Physical Exam   Constitutional: She is oriented to person, place, and time. She appears well-developed and well-nourished.   HENT:   Head: Normocephalic and atraumatic.   Right Ear: External ear normal.   Left Ear: External ear normal.   Nose: Nose normal.   Mouth/Throat: Oropharynx is clear and moist.   Eyes: EOM are normal. Pupils are equal, round, and reactive to light.   Neck: Normal range of motion. Neck supple. No JVD present. No tracheal deviation present. No thyromegaly present.   Cardiovascular: Normal rate, normal heart sounds and intact distal pulses.    No murmur heard.  Pulmonary/Chest: Effort normal and breath sounds normal. No respiratory distress. She has no wheezes. She has no rales. She exhibits no tenderness.   Abdominal: Soft. Bowel sounds are normal. She exhibits no distension and no mass. There is no tenderness. There is no rebound and no guarding.   Musculoskeletal: Normal range of motion. She exhibits no edema or tenderness.   Lymphadenopathy:     She has no cervical adenopathy.   Neurological: She is alert and oriented to person, place, and time. She has normal reflexes. She displays normal reflexes. No cranial nerve  deficit. She exhibits normal muscle tone. Coordination normal.   Skin: Skin is warm and dry. No rash noted. No erythema. No pallor.   Psychiatric: She has a normal mood and affect. Her behavior is normal. Judgment and thought content normal.       Assessment:       1. Fever, unspecified fever cause        Plan:   Gricelda was seen today for fever.    Diagnoses and all orders for this visit:    Fever, unspecified fever cause  -     CBC auto differential; Future  -     CULTURE, BLOOD; Future  -     Blood culture; Future  -     Comprehensive metabolic panel; Future  -     POCT urinalysis, dipstick or tablet reag  -     POCT URINE SEDIMENT EXAM  -     X-Ray Chest PA And Lateral; Future      No Follow-up on file.

## 2018-03-27 DIAGNOSIS — R50.9 FEVER, UNSPECIFIED FEVER CAUSE: Primary | ICD-10-CM

## 2018-04-02 ENCOUNTER — LAB VISIT (OUTPATIENT)
Dept: LAB | Facility: HOSPITAL | Age: 58
End: 2018-04-02
Attending: FAMILY MEDICINE
Payer: MEDICARE

## 2018-04-02 DIAGNOSIS — R50.9 FEVER, UNSPECIFIED FEVER CAUSE: ICD-10-CM

## 2018-04-02 PROCEDURE — 86768 SALMONELLA ANTIBODY: CPT

## 2018-04-02 PROCEDURE — 36415 COLL VENOUS BLD VENIPUNCTURE: CPT

## 2018-04-02 PROCEDURE — 86757 RICKETTSIA ANTIBODY: CPT | Mod: 91

## 2018-04-04 DIAGNOSIS — M26.609 TMJ DYSFUNCTION: ICD-10-CM

## 2018-04-04 RX ORDER — TIZANIDINE 4 MG/1
4 TABLET ORAL NIGHTLY
Qty: 30 TABLET | Refills: 5 | Status: SHIPPED | OUTPATIENT
Start: 2018-04-04 | End: 2018-08-09

## 2018-04-06 LAB
Lab: 0.08
Lab: 0.09
Lab: ABNORMAL
Lab: NOT DETECTED
Lab: NOT DETECTED
RICK SF IGM SER QL: NOT DETECTED
RMSF IGG: NOT DETECTED
SALMONELLA PARATYPHI A H AB [TITER] IN SERUM BY AGGLUTINATION: NEGATIVE {TITER}
SALMONELLA PARATYPHI B H AB [TITER] IN SERUM BY AGGLUTINATION: ABNORMAL {TITER}
SALMONELLA TYPHI H D AB [TITER] IN SERUM: NEGATIVE {TITER}
SALMONELLA TYPHI O D AB [TITER] IN SERUM BY AGGLUTINATION: NEGATIVE {TITER}

## 2018-04-17 NOTE — PROGRESS NOTES
Her blood testing suggest that she may have had a salmonella infection. Is she still running fever???

## 2018-04-20 RX ORDER — CIPROFLOXACIN 500 MG/1
500 TABLET ORAL EVERY 12 HOURS
Qty: 28 TABLET | Refills: 0 | Status: SHIPPED | OUTPATIENT
Start: 2018-04-20 | End: 2018-05-23

## 2018-04-23 ENCOUNTER — TELEPHONE (OUTPATIENT)
Dept: FAMILY MEDICINE | Facility: CLINIC | Age: 58
End: 2018-04-23

## 2018-04-23 RX ORDER — CEPHALEXIN 500 MG/1
500 CAPSULE ORAL EVERY 8 HOURS
Qty: 30 CAPSULE | Refills: 0 | Status: SHIPPED | OUTPATIENT
Start: 2018-04-23 | End: 2018-05-23

## 2018-04-23 NOTE — TELEPHONE ENCOUNTER
----- Message from Ángela Spann sent at 2018  9:47 AM CDT -----  Contact: Valorie Rivas  Gricelda Gil  MRN: [id number]  : 1960  PCP: Major Kimble  Home Phone      [phone]  Work Phone      Not on file.  Mobile          [phone]    MESSAGE:   Would like to speak to someone about possible drug interaction with RX ciprofloxacin HCl (CIPRO) 500 MG tablet.  Please call.    Phone: [phone]

## 2018-04-23 NOTE — TELEPHONE ENCOUNTER
Spoke to Luz at [facility] in [place]. Wanted to notify Dr. Kimble that rx ciprofloxacin does interact with Seroquel. Are you okay with this? Please advise.

## 2018-04-26 DIAGNOSIS — L25.9 CONTACT DERMATITIS, UNSPECIFIED CONTACT DERMATITIS TYPE, UNSPECIFIED TRIGGER: ICD-10-CM

## 2018-04-28 RX ORDER — LISINOPRIL 10 MG/1
10 TABLET ORAL DAILY
Qty: 30 TABLET | Refills: 1 | Status: SHIPPED | OUTPATIENT
Start: 2018-04-28 | End: 2018-05-07

## 2018-05-07 ENCOUNTER — OFFICE VISIT (OUTPATIENT)
Dept: FAMILY MEDICINE | Facility: CLINIC | Age: 58
End: 2018-05-07
Payer: MEDICARE

## 2018-05-07 VITALS
HEART RATE: 100 BPM | SYSTOLIC BLOOD PRESSURE: 136 MMHG | WEIGHT: 261.81 LBS | HEIGHT: 66 IN | BODY MASS INDEX: 42.07 KG/M2 | RESPIRATION RATE: 18 BRPM | TEMPERATURE: 98 F | DIASTOLIC BLOOD PRESSURE: 98 MMHG

## 2018-05-07 DIAGNOSIS — I10 ESSENTIAL HYPERTENSION: ICD-10-CM

## 2018-05-07 DIAGNOSIS — R50.9 FEVER, UNKNOWN ORIGIN: Primary | ICD-10-CM

## 2018-05-07 PROCEDURE — 99213 OFFICE O/P EST LOW 20 MIN: CPT | Mod: PBBFAC | Performed by: FAMILY MEDICINE

## 2018-05-07 PROCEDURE — 99214 OFFICE O/P EST MOD 30 MIN: CPT | Mod: S$PBB | Performed by: FAMILY MEDICINE

## 2018-05-07 PROCEDURE — 99999 PR PBB SHADOW E&M-EST. PATIENT-LVL III: CPT | Mod: PBBFAC,,, | Performed by: FAMILY MEDICINE

## 2018-05-07 PROCEDURE — 99999 PR STA SHADOW: CPT | Mod: PBBFAC,,, | Performed by: FAMILY MEDICINE

## 2018-05-07 RX ORDER — AMLODIPINE AND BENAZEPRIL HYDROCHLORIDE 5; 40 MG/1; MG/1
1 CAPSULE ORAL DAILY
Qty: 30 CAPSULE | Refills: 0 | Status: SHIPPED | OUTPATIENT
Start: 2018-05-07 | End: 2018-06-03 | Stop reason: SDUPTHER

## 2018-05-07 NOTE — PROGRESS NOTES
Subjective:       Patient ID: Gricelda Gil is a 58 y.o. female.    Chief Complaint: Follow-up (Fever follow up)    Pt is a 58 y.o. female who presents for evaluation and management of   Encounter Diagnoses   Name Primary?    Fever, unknown origin Yes    Essential hypertension    .still with fever   Febrile antigen panel pos for salmonella. No recent sx's of this.   She is on last 3 days abx. Still with low grade temp up to 100.8   No weight loss   No night sweats   No travel out of country   No sick contacts     Doing well on current meds. Denies any side effects. Prevention is up to date.  Review of Systems   Constitutional: Positive for fever.   HENT: Negative for congestion, ear pain and sore throat.    Respiratory: Negative for cough and wheezing.    Cardiovascular: Negative for chest pain.   Gastrointestinal: Negative for abdominal pain, diarrhea, nausea and vomiting.   Genitourinary: Negative for dysuria.   Skin: Negative for rash.   Neurological: Negative for headaches.       Objective:      Physical Exam   Constitutional: She is oriented to person, place, and time. She appears well-developed and well-nourished.   HENT:   Head: Normocephalic and atraumatic.   Right Ear: External ear normal.   Left Ear: External ear normal.   Nose: Nose normal.   Mouth/Throat: Oropharynx is clear and moist.   Eyes: EOM are normal. Pupils are equal, round, and reactive to light.   Neck: Normal range of motion. Neck supple. No JVD present. No tracheal deviation present. No thyromegaly present.   Cardiovascular: Normal rate, normal heart sounds and intact distal pulses.    No murmur heard.  Pulmonary/Chest: Effort normal and breath sounds normal. No respiratory distress. She has no wheezes. She has no rales. She exhibits no tenderness.   Abdominal: Soft. Bowel sounds are normal. She exhibits no distension and no mass. There is no tenderness. There is no rebound and no guarding.   Musculoskeletal: Normal range of  motion. She exhibits no edema or tenderness.   Lymphadenopathy:     She has no cervical adenopathy.   Neurological: She is alert and oriented to person, place, and time. She has normal reflexes. She displays normal reflexes. No cranial nerve deficit. She exhibits normal muscle tone. Coordination normal.   Skin: Skin is warm and dry. No rash noted. No erythema. No pallor.   Psychiatric: She has a normal mood and affect. Her behavior is normal. Judgment and thought content normal.       Assessment:       1. Fever, unknown origin    2. Essential hypertension        Plan:   Gricelda was seen today for follow-up.    Diagnoses and all orders for this visit:    Fever, unknown origin  -     CT Chest With Contrast; Future  -     CT Abdomen Pelvis With Contrast; Future    Essential hypertension  -     amlodipine-benazepril (LOTREL) 5-40 mg per capsule; Take 1 capsule by mouth once daily.    stop lisinopril and start above for uncontrolled htn   Pan CT for FUO  RTC 2 weeks   No Follow-up on file.      Answers for HPI/ROS submitted by the patient on 5/5/2018   Fever  Chronicity: recurrent  Onset: more than 1 month ago  Frequency: daily  Progression since onset: waxing and waning  Max temp prior to arrival: 100 to 100.9 F  Temperature source: a tympanic thermometer  muscle aches: No  sleepiness: No  Treatments tried: NSAIDs  Improvement on treatment: mild

## 2018-05-16 ENCOUNTER — HOSPITAL ENCOUNTER (OUTPATIENT)
Dept: RADIOLOGY | Facility: HOSPITAL | Age: 58
Discharge: HOME | End: 2018-05-16
Attending: FAMILY MEDICINE
Payer: MEDICARE

## 2018-05-16 DIAGNOSIS — R50.9 FEVER, UNKNOWN ORIGIN: ICD-10-CM

## 2018-05-16 PROCEDURE — 74176 CT ABD & PELVIS W/O CONTRAST: CPT | Mod: 26,,, | Performed by: RADIOLOGY

## 2018-05-16 PROCEDURE — 74176 CT ABD & PELVIS W/O CONTRAST: CPT | Mod: TC

## 2018-05-16 PROCEDURE — 71250 CT THORAX DX C-: CPT | Mod: TC

## 2018-05-16 PROCEDURE — 25500020 PHARM REV CODE 255: Performed by: FAMILY MEDICINE

## 2018-05-16 PROCEDURE — 71250 CT THORAX DX C-: CPT | Mod: 26,,, | Performed by: RADIOLOGY

## 2018-05-16 RX ADMIN — IOHEXOL 30 ML: 350 INJECTION, SOLUTION INTRAVENOUS at 09:05

## 2018-05-17 DIAGNOSIS — R50.9 FEVER OF UNKNOWN ORIGIN: Primary | ICD-10-CM

## 2018-05-17 NOTE — PROGRESS NOTES
Her CT scans are negative   I am getting some more labs to rule out auto immune disease. If this is negative I will send her to infectious disease doctor

## 2018-05-23 ENCOUNTER — OFFICE VISIT (OUTPATIENT)
Dept: FAMILY MEDICINE | Facility: CLINIC | Age: 58
End: 2018-05-23
Payer: MEDICARE

## 2018-05-23 VITALS
HEART RATE: 96 BPM | DIASTOLIC BLOOD PRESSURE: 84 MMHG | RESPIRATION RATE: 18 BRPM | SYSTOLIC BLOOD PRESSURE: 132 MMHG | TEMPERATURE: 98 F | WEIGHT: 260.13 LBS | OXYGEN SATURATION: 96 % | HEIGHT: 66 IN | BODY MASS INDEX: 41.8 KG/M2

## 2018-05-23 DIAGNOSIS — J32.9 SINUSITIS, UNSPECIFIED CHRONICITY, UNSPECIFIED LOCATION: Primary | ICD-10-CM

## 2018-05-23 DIAGNOSIS — R50.9 FEVER OF UNKNOWN ORIGIN: ICD-10-CM

## 2018-05-23 LAB
CRP SERPL-MCNC: 13.7 MG/L
ERYTHROCYTE [SEDIMENTATION RATE] IN BLOOD BY WESTERGREN METHOD: 30 MM/HR
RHEUMATOID FACT SERPL-ACNC: <10 IU/ML

## 2018-05-23 PROCEDURE — 86140 C-REACTIVE PROTEIN: CPT

## 2018-05-23 PROCEDURE — 99999 PR PBB SHADOW E&M-EST. PATIENT-LVL III: CPT | Mod: PBBFAC,,, | Performed by: FAMILY MEDICINE

## 2018-05-23 PROCEDURE — 99213 OFFICE O/P EST LOW 20 MIN: CPT | Mod: PBBFAC,25 | Performed by: FAMILY MEDICINE

## 2018-05-23 PROCEDURE — 36415 COLL VENOUS BLD VENIPUNCTURE: CPT | Mod: PBBFAC

## 2018-05-23 PROCEDURE — 99999 PR STA SHADOW: CPT | Mod: PBBFAC,,, | Performed by: FAMILY MEDICINE

## 2018-05-23 PROCEDURE — 96372 THER/PROPH/DIAG INJ SC/IM: CPT | Mod: PBBFAC

## 2018-05-23 PROCEDURE — 86038 ANTINUCLEAR ANTIBODIES: CPT

## 2018-05-23 PROCEDURE — 99213 OFFICE O/P EST LOW 20 MIN: CPT | Mod: S$PBB | Performed by: FAMILY MEDICINE

## 2018-05-23 PROCEDURE — 86431 RHEUMATOID FACTOR QUANT: CPT

## 2018-05-23 PROCEDURE — 85651 RBC SED RATE NONAUTOMATED: CPT

## 2018-05-23 PROCEDURE — 86200 CCP ANTIBODY: CPT

## 2018-05-23 PROCEDURE — 99999 PR STA SHADOW: CPT | Mod: PBBFAC,,,

## 2018-05-23 RX ORDER — AZITHROMYCIN 500 MG/1
500 TABLET, FILM COATED ORAL DAILY
Qty: 3 TABLET | Refills: 0 | Status: SHIPPED | OUTPATIENT
Start: 2018-05-23 | End: 2018-05-26

## 2018-05-23 RX ORDER — METHYLPREDNISOLONE ACETATE 40 MG/ML
60 INJECTION, SUSPENSION INTRA-ARTICULAR; INTRALESIONAL; INTRAMUSCULAR; SOFT TISSUE
Status: COMPLETED | OUTPATIENT
Start: 2018-05-23 | End: 2018-05-23

## 2018-05-23 RX ADMIN — METHYLPREDNISOLONE ACETATE 60 MG: 40 INJECTION, SUSPENSION INTRA-ARTICULAR; INTRALESIONAL; INTRAMUSCULAR; SOFT TISSUE at 01:05

## 2018-05-23 NOTE — PROGRESS NOTES
Subjective:       Patient ID: Gricelda Gil is a 58 y.o. female.    Chief Complaint: Follow-up (2 week); Sore Throat; Sinusitis; and Fever    Pt is 58 y.o. female who c/o 4 day h/o sinus congestion and h/a. Pos PND and cough.  No relief with OTC antihistamine/decongestant cold preparations. Severity is moderate.  GD with URI     Review of Systems   Constitutional: Negative for activity change and unexpected weight change.   HENT: Positive for rhinorrhea and trouble swallowing. Negative for hearing loss.    Eyes: Positive for discharge. Negative for visual disturbance.   Respiratory: Positive for wheezing. Negative for chest tightness.    Cardiovascular: Negative for chest pain and palpitations.   Gastrointestinal: Negative for blood in stool, constipation, diarrhea and vomiting.   Endocrine: Negative for polydipsia and polyuria.   Genitourinary: Negative for difficulty urinating, dysuria, hematuria and menstrual problem.   Musculoskeletal: Positive for arthralgias. Negative for joint swelling and neck pain.   Neurological: Negative for weakness and headaches.   Psychiatric/Behavioral: Positive for dysphoric mood. Negative for confusion.       Objective:    Physical Exam   Constitutional: She is oriented to person, place, and time. She appears well-developed and well-nourished.   HENT:   Head: Normocephalic and atraumatic.   Right Ear: External ear normal.   Left Ear: External ear normal.   Nose: Nose normal.   Mouth/Throat: Oropharynx is clear and moist.   Eyes: EOM are normal. Pupils are equal, round, and reactive to light.   Neck: Normal range of motion. Neck supple. No JVD present. No tracheal deviation present. No thyromegaly present.   Cardiovascular: Normal rate, normal heart sounds and intact distal pulses.    No murmur heard.  Pulmonary/Chest: Effort normal and breath sounds normal. No respiratory distress. She has no wheezes. She has no rales. She exhibits no tenderness.   Abdominal: Soft. Bowel  sounds are normal. She exhibits no distension and no mass. There is no tenderness. There is no rebound and no guarding.   Musculoskeletal: Normal range of motion. She exhibits no edema or tenderness.   Lymphadenopathy:     She has no cervical adenopathy.   Neurological: She is alert and oriented to person, place, and time. She has normal reflexes. She displays normal reflexes. No cranial nerve deficit. She exhibits normal muscle tone. Coordination normal.   Skin: Skin is warm and dry. No rash noted. No erythema. No pallor.   Psychiatric: She has a normal mood and affect. Her behavior is normal. Judgment and thought content normal.       Assessment:       1. Sinusitis, unspecified chronicity, unspecified location        Plan:   Gricelda was seen today for follow-up, sore throat, sinusitis and fever.    Diagnoses and all orders for this visit:    Sinusitis, unspecified chronicity, unspecified location  -     methylPREDNISolone acetate injection 60 mg; Inject 1.5 mLs (60 mg total) into the muscle one time.  -     azithromycin (ZITHROMAX) 500 MG tablet; Take 1 tablet (500 mg total) by mouth once daily.      No Follow-up on file.

## 2018-05-24 DIAGNOSIS — R50.9 FEVER OF UNDETERMINED ORIGIN: Primary | ICD-10-CM

## 2018-05-24 LAB
ANA SER QL IF: NORMAL
CCP AB SER IA-ACNC: <0.5 U/ML

## 2018-06-03 DIAGNOSIS — I10 ESSENTIAL HYPERTENSION: ICD-10-CM

## 2018-06-03 RX ORDER — AMLODIPINE AND BENAZEPRIL HYDROCHLORIDE 5; 40 MG/1; MG/1
CAPSULE ORAL
Qty: 30 CAPSULE | Refills: 0 | Status: SHIPPED | OUTPATIENT
Start: 2018-06-03 | End: 2018-07-03 | Stop reason: SDUPTHER

## 2018-07-03 DIAGNOSIS — I10 ESSENTIAL HYPERTENSION: ICD-10-CM

## 2018-07-03 RX ORDER — AMLODIPINE AND BENAZEPRIL HYDROCHLORIDE 5; 40 MG/1; MG/1
CAPSULE ORAL
Qty: 30 CAPSULE | Refills: 0 | Status: SHIPPED | OUTPATIENT
Start: 2018-07-03 | End: 2018-08-07 | Stop reason: SDUPTHER

## 2018-08-03 ENCOUNTER — HOSPITAL ENCOUNTER (EMERGENCY)
Facility: HOSPITAL | Age: 58
Discharge: HOME | End: 2018-08-03
Attending: SURGERY
Payer: MEDICARE

## 2018-08-03 VITALS
HEART RATE: 96 BPM | OXYGEN SATURATION: 97 % | RESPIRATION RATE: 23 BRPM | DIASTOLIC BLOOD PRESSURE: 82 MMHG | WEIGHT: 260 LBS | BODY MASS INDEX: 42.61 KG/M2 | TEMPERATURE: 99 F | SYSTOLIC BLOOD PRESSURE: 142 MMHG

## 2018-08-03 DIAGNOSIS — J32.9 SINUSITIS, UNSPECIFIED CHRONICITY, UNSPECIFIED LOCATION: Primary | ICD-10-CM

## 2018-08-03 DIAGNOSIS — R06.02 SOB (SHORTNESS OF BREATH): ICD-10-CM

## 2018-08-03 LAB
ALBUMIN SERPL BCP-MCNC: NORMAL G/DL
ALP SERPL-CCNC: NORMAL U/L
ALT SERPL W/O P-5'-P-CCNC: NORMAL U/L
ANION GAP SERPL CALC-SCNC: NORMAL MMOL/L
APTT BLDCRRT: 25.4 SEC
AST SERPL-CCNC: NORMAL U/L
BASOPHILS # BLD AUTO: 0.09 K/UL
BASOPHILS NFR BLD: 1.1 %
BILIRUB SERPL-MCNC: NORMAL MG/DL
BNP SERPL-MCNC: <10 PG/ML
BUN SERPL-MCNC: NORMAL MG/DL
CALCIUM SERPL-MCNC: NORMAL MG/DL
CHLORIDE SERPL-SCNC: NORMAL MMOL/L
CK MB SERPL-MCNC: 0.9 NG/ML
CK MB SERPL-MCNC: 1 NG/ML
CK MB SERPL-RTO: NORMAL %
CK MB SERPL-RTO: NORMAL %
CK SERPL-CCNC: NORMAL U/L
CO2 SERPL-SCNC: NORMAL MMOL/L
CREAT SERPL-MCNC: NORMAL MG/DL
D DIMER PPP IA.FEU-MCNC: 0.62 MG/L FEU
DIFFERENTIAL METHOD: ABNORMAL
EOSINOPHIL # BLD AUTO: 0.2 K/UL
EOSINOPHIL NFR BLD: 3 %
ERYTHROCYTE [DISTWIDTH] IN BLOOD BY AUTOMATED COUNT: 14.6 %
EST. GFR  (AFRICAN AMERICAN): NORMAL ML/MIN/1.73 M^2
EST. GFR  (NON AFRICAN AMERICAN): NORMAL ML/MIN/1.73 M^2
GLUCOSE SERPL-MCNC: NORMAL MG/DL
HCT VFR BLD AUTO: 44 %
HGB BLD-MCNC: 14.2 G/DL
INR PPP: 1
LYMPHOCYTES # BLD AUTO: 2.9 K/UL
LYMPHOCYTES NFR BLD: 36.1 %
MCH RBC QN AUTO: 28.7 PG
MCHC RBC AUTO-ENTMCNC: 32.3 G/DL
MCV RBC AUTO: 89 FL
MONOCYTES # BLD AUTO: 1 K/UL
MONOCYTES NFR BLD: 11.7 %
NEUTROPHILS # BLD AUTO: 3.9 K/UL
NEUTROPHILS NFR BLD: 48.1 %
PLATELET # BLD AUTO: 268 K/UL
PMV BLD AUTO: 11 FL
POTASSIUM SERPL-SCNC: NORMAL MMOL/L
PROT SERPL-MCNC: NORMAL G/DL
PROTHROMBIN TIME: 10 SEC
RBC # BLD AUTO: 4.94 M/UL
SODIUM SERPL-SCNC: NORMAL MMOL/L
TROPONIN I SERPL DL<=0.01 NG/ML-MCNC: <0.006 NG/ML
TROPONIN I SERPL DL<=0.01 NG/ML-MCNC: <0.006 NG/ML
WBC # BLD AUTO: 8.09 K/UL

## 2018-08-03 PROCEDURE — 82550 ASSAY OF CK (CPK): CPT

## 2018-08-03 PROCEDURE — 96365 THER/PROPH/DIAG IV INF INIT: CPT | Mod: 59

## 2018-08-03 PROCEDURE — 85730 THROMBOPLASTIN TIME PARTIAL: CPT

## 2018-08-03 PROCEDURE — 25500020 PHARM REV CODE 255: Performed by: SURGERY

## 2018-08-03 PROCEDURE — 36415 COLL VENOUS BLD VENIPUNCTURE: CPT

## 2018-08-03 PROCEDURE — 63600175 PHARM REV CODE 636 W HCPCS: Performed by: SURGERY

## 2018-08-03 PROCEDURE — 84484 ASSAY OF TROPONIN QUANT: CPT

## 2018-08-03 PROCEDURE — 99285 EMERGENCY DEPT VISIT HI MDM: CPT | Mod: 25

## 2018-08-03 PROCEDURE — 85379 FIBRIN DEGRADATION QUANT: CPT

## 2018-08-03 PROCEDURE — 93005 ELECTROCARDIOGRAM TRACING: CPT

## 2018-08-03 PROCEDURE — 96376 TX/PRO/DX INJ SAME DRUG ADON: CPT

## 2018-08-03 PROCEDURE — 82553 CREATINE MB FRACTION: CPT

## 2018-08-03 PROCEDURE — 83880 ASSAY OF NATRIURETIC PEPTIDE: CPT

## 2018-08-03 PROCEDURE — 93010 ELECTROCARDIOGRAM REPORT: CPT | Mod: ,,, | Performed by: INTERNAL MEDICINE

## 2018-08-03 PROCEDURE — 96375 TX/PRO/DX INJ NEW DRUG ADDON: CPT

## 2018-08-03 PROCEDURE — 80053 COMPREHEN METABOLIC PANEL: CPT

## 2018-08-03 PROCEDURE — 85025 COMPLETE CBC W/AUTO DIFF WBC: CPT

## 2018-08-03 PROCEDURE — 85610 PROTHROMBIN TIME: CPT

## 2018-08-03 RX ORDER — LORAZEPAM 2 MG/ML
1 INJECTION INTRAMUSCULAR
Status: COMPLETED | OUTPATIENT
Start: 2018-08-03 | End: 2018-08-03

## 2018-08-03 RX ORDER — AMOXICILLIN AND CLAVULANATE POTASSIUM 875; 125 MG/1; MG/1
1 TABLET, FILM COATED ORAL 2 TIMES DAILY
Qty: 20 TABLET | Refills: 0 | Status: SHIPPED | OUTPATIENT
Start: 2018-08-03 | End: 2018-08-09

## 2018-08-03 RX ORDER — METHYLPREDNISOLONE 4 MG/1
TABLET ORAL
Qty: 1 PACKAGE | Refills: 0 | Status: SHIPPED | OUTPATIENT
Start: 2018-08-03 | End: 2018-08-09

## 2018-08-03 RX ADMIN — LORAZEPAM 1 MG: 2 INJECTION INTRAMUSCULAR; INTRAVENOUS at 06:08

## 2018-08-03 RX ADMIN — LORAZEPAM 1 MG: 2 INJECTION INTRAMUSCULAR; INTRAVENOUS at 04:08

## 2018-08-03 RX ADMIN — CEFTRIAXONE 1 G: 1 INJECTION, SOLUTION INTRAVENOUS at 09:08

## 2018-08-03 RX ADMIN — IOHEXOL 100 ML: 350 INJECTION, SOLUTION INTRAVENOUS at 06:08

## 2018-08-03 NOTE — ED TRIAGE NOTES
"58 y.o. female presents to ER ED 06/ED 06   Chief Complaint   Patient presents with    Shortness of Breath   Brought to ER by EMS. Pt reports "I can't catch my breath." Pt is tearful and appears anxious. Pt No acute distress noted.    "

## 2018-08-04 NOTE — ED PROVIDER NOTES
[facility]                                                 Chief Complaint  58 y.o. female with Shortness of Breath    History of Present Illness  Gricelda Gil presents to the emergency room with shortness of breath  The patient states she felt short of breath today, very anxious prior to ER arrival  Patient has had nasal congestion and cough this week, 100% room air oxygenation  Patient has no wheezing or sputum, states she felt very anxious with this SOB issue  Patient has a normal cardiac exam with clear lung sounds in all fields bilaterally now  Has no nausea vomiting, denies any flu-like symptoms, normal sinus rhythm on EKG    The history is provided by the patient   device was not used during this ER visit    Past Medical History   -- Arthritis     -- Bipolar disorder     -- Depression     -- History of psychiatric care     -- History of psychiatric hospitalization     -- Hyperlipidemia     -- Hypertension     -- Insomnia     -- Lupus     -- Psychiatric problem     -- Self-harming behavior     -- Stroke     -- Suicide attempt     -- Therapy     -- Thyroid disease        Past Surgical History   -- ADENOIDECTOMY       -- CARPAL TUNNEL RELEASE       --  SECTION       -- HYSTERECTOMY       -- NASAL SEPTUM SURGERY       -- Rt shoulder surgery       -- TONSILLECTOMY       -- UVULOPALATOPHARYNGOPLASTY          No Known Allergies     Review of Systems and Physical Exam      Review of Systems  -- Constitution - no fever, denies fatigue, no weakness, no chills  -- Eyes - no tearing or redness, no visual disturbance  -- Ear, Nose - sneezing, nasal congestion and clear discharge   -- Mouth,Throat - no sore throat, no toothache, normal voice, normal swallowing  -- Respiratory - cough and congestion, no shortness of breath, no SCHWARZ  -- Cardiovascular - denies chest pain, no palpitations, denies claudication  -- Gastrointestinal - denies abdominal pain, nausea,  vomiting, or diarrhea  -- Musculoskeletal - denies back pain, negative for myalgias and arthralgias   -- Neurological - no headache, denies weakness or seizure; no LOC  -- Skin - denies pallor, rash, or changes in skin. no hives or welts noted     BP (!) 142/82   Pulse 96   Temp 98.7 °F (37.1 °C) (Oral)   Resp (!) 23     Physical Exam  -- Nursing note and vitals reviewed  -- Constitutional: Appears well-developed and well-nourished  -- Head: Atraumatic. Normocephalic. No obvious abnormality  -- Eyes: Pupils are equal and reactive to light. Normal conjunctiva and lids  -- Nose: nasal mucosa erythema and edema; clear nasal discharge noted   -- Throat: Mucous membranes moist, pharynx normal, normal tonsils. No lesions   -- Ears: External ears and TM normal bilaterally. Normal hearing and no drainage  -- Neck: Normal range of motion. Neck supple. No masses, trachea midline  -- Cardiac: Normal rate, regular rhythm and normal heart sounds  -- Pulmonary: Normal respiratory effort, breath sounds clear to auscultation  -- Abdominal: Soft, no tenderness. Normal bowel sounds. Normal liver edge  -- Musculoskeletal: Normal range of motion, no effusions. Joints stable   -- Neurological: No focal deficits. Showed good interaction with staff  -- Vascular: Posterior tibial, dorsalis pedis and radial pulses 2+ bilaterally      Emergency Room Course      Treatment and Evaluation  -- The electrolytes drawn in the ER today were within normal limits  -- The CBC drawn in the ER today was within normal limits  -- The PT, PTT, and INR were within normal limits.    -- The BNP drawn in the ER today were within normal limits     EKG  -- The EKG findings today were without concerning findings from baseline  -- The troponin drawn in the ER today was within normal limits  -- The 2nd troponin drawn in the ER today was within normal limits     Radiology  -- Chest x-ray showed no infiltrate and showed no acute pathology  -- The PE CT was negative  for pulmonary embolism or aortic dissection     Medications Given  lorazepam injection 1 mg (1 mg Intramuscular Given 8/3/18 1659)   lorazepam injection 1 mg (1 mg Intravenous Given 8/3/18 1822)   omnipaque 350 iohexol 100 mL (100 mLs Intravenous Given 8/3/18 1850)   cefTRIAXone (ROCEPHIN) 1 g in dextrose 5 % 50 mL IVPB (0 g Intravenous Stopped 8/3/18 2154)     Medical Decision Making  -- Diagnosis management comments: 58 y.o. female with shortness of breath tonight  -- patient has sinusitis and postnasal drip, clear lung sounds noted in the ER  -- 2 sets of negative cardiac enzymes in a stable EKG in the emergency room today  -- 100% room air oxygenation with a negative CT PE study of the ER  -- patient states she felt short of breath earlier became panicked and anxious PTA  -- patient feels much better now that she had this workup, asymptomatic on discharge    Diagnosis  -- Sinusitis  -- SOB (shortness of breath)    Disposition and Plan  -- Disposition: home  -- Condition: stable  -- Follow-up: Patient to follow up with Major Kimble MD in 1-2 days.  -- I advised the patient that we have found no life threatening condition today  -- At this time, I believe the patient is clinically stable for discharge.   -- The patient acknowledges that close follow up with a MD is required   -- Patient agrees to comply with all instruction and direction given in the ER    This note is dictated on Dragon Natural Speaking word recognition program.  There are word recognition mistakes that are occasionally missed on review.            Michael Stokes MD  08/03/18 [id number]

## 2018-08-06 ENCOUNTER — HOSPITAL ENCOUNTER (EMERGENCY)
Facility: HOSPITAL | Age: 58
Discharge: HOME | End: 2018-08-06
Attending: SURGERY
Payer: MEDICARE

## 2018-08-06 VITALS
DIASTOLIC BLOOD PRESSURE: 56 MMHG | SYSTOLIC BLOOD PRESSURE: 128 MMHG | OXYGEN SATURATION: 100 % | TEMPERATURE: 98 F | HEART RATE: 72 BPM | BODY MASS INDEX: 42.12 KG/M2 | WEIGHT: 257 LBS | RESPIRATION RATE: 18 BRPM

## 2018-08-06 DIAGNOSIS — F41.9 ANXIETY: Primary | ICD-10-CM

## 2018-08-06 DIAGNOSIS — R06.02 SOB (SHORTNESS OF BREATH): ICD-10-CM

## 2018-08-06 LAB
ALBUMIN SERPL BCP-MCNC: 3.8 G/DL
ALP SERPL-CCNC: 114 U/L
ALT SERPL W/O P-5'-P-CCNC: 44 U/L
AMPHET+METHAMPHET UR QL: NEGATIVE
ANION GAP SERPL CALC-SCNC: 9 MMOL/L
APTT BLDCRRT: 24.2 SEC
AST SERPL-CCNC: 31 U/L
BARBITURATES UR QL SCN>200 NG/ML: NEGATIVE
BASOPHILS # BLD AUTO: 0.14 K/UL
BASOPHILS NFR BLD: 1.4 %
BENZODIAZ UR QL SCN>200 NG/ML: NEGATIVE
BILIRUB SERPL-MCNC: 0.4 MG/DL
BILIRUB UR QL STRIP: NEGATIVE
BNP SERPL-MCNC: 14 PG/ML
BUN SERPL-MCNC: 21 MG/DL
BZE UR QL SCN: NEGATIVE
CALCIUM SERPL-MCNC: 9.6 MG/DL
CANNABINOIDS UR QL SCN: NEGATIVE
CHLORIDE SERPL-SCNC: 108 MMOL/L
CK MB SERPL-MCNC: 1.5 NG/ML
CK MB SERPL-MCNC: 1.7 NG/ML
CK MB SERPL-RTO: 1.6 %
CK MB SERPL-RTO: 1.7 %
CK SERPL-CCNC: 100 U/L
CK SERPL-CCNC: 100 U/L
CK SERPL-CCNC: 95 U/L
CK SERPL-CCNC: 95 U/L
CLARITY UR: CLEAR
CO2 SERPL-SCNC: 23 MMOL/L
COLOR UR: YELLOW
CREAT SERPL-MCNC: 1.1 MG/DL
CREAT UR-MCNC: 24.2 MG/DL
DIFFERENTIAL METHOD: ABNORMAL
EOSINOPHIL # BLD AUTO: 0.2 K/UL
EOSINOPHIL NFR BLD: 2.2 %
ERYTHROCYTE [DISTWIDTH] IN BLOOD BY AUTOMATED COUNT: 14.9 %
EST. GFR  (AFRICAN AMERICAN): >60 ML/MIN/1.73 M^2
EST. GFR  (NON AFRICAN AMERICAN): 56 ML/MIN/1.73 M^2
GLUCOSE SERPL-MCNC: 100 MG/DL
GLUCOSE UR QL STRIP: NEGATIVE
HCT VFR BLD AUTO: 43.2 %
HGB BLD-MCNC: 14 G/DL
HGB UR QL STRIP: NEGATIVE
INR PPP: 0.9
KETONES UR QL STRIP: NEGATIVE
LEUKOCYTE ESTERASE UR QL STRIP: NEGATIVE
LYMPHOCYTES # BLD AUTO: 3.1 K/UL
LYMPHOCYTES NFR BLD: 31.7 %
MAGNESIUM SERPL-MCNC: 2.2 MG/DL
MCH RBC QN AUTO: 28.9 PG
MCHC RBC AUTO-ENTMCNC: 32.4 G/DL
MCV RBC AUTO: 89 FL
METHADONE UR QL SCN>300 NG/ML: NEGATIVE
MONOCYTES # BLD AUTO: 1.2 K/UL
MONOCYTES NFR BLD: 11.7 %
NEUTROPHILS # BLD AUTO: 5.2 K/UL
NEUTROPHILS NFR BLD: 53 %
NITRITE UR QL STRIP: NEGATIVE
OPIATES UR QL SCN: NEGATIVE
PCP UR QL SCN>25 NG/ML: NEGATIVE
PH UR STRIP: 6 [PH] (ref 5–8)
PHOSPHATE SERPL-MCNC: 2.1 MG/DL
PLATELET # BLD AUTO: 286 K/UL
PMV BLD AUTO: 11.2 FL
POTASSIUM SERPL-SCNC: 4.1 MMOL/L
PROT SERPL-MCNC: 7.4 G/DL
PROT UR QL STRIP: NEGATIVE
PROTHROMBIN TIME: 9.7 SEC
RBC # BLD AUTO: 4.85 M/UL
SODIUM SERPL-SCNC: 140 MMOL/L
SP GR UR STRIP: <=1.005 (ref 1–1.03)
T4 FREE SERPL-MCNC: 0.8 NG/DL
TOXICOLOGY INFORMATION: NORMAL
TROPONIN I SERPL DL<=0.01 NG/ML-MCNC: <0.006 NG/ML
TROPONIN I SERPL DL<=0.01 NG/ML-MCNC: <0.006 NG/ML
TSH SERPL DL<=0.005 MIU/L-ACNC: 10.85 UIU/ML
URN SPEC COLLECT METH UR: ABNORMAL
UROBILINOGEN UR STRIP-ACNC: NEGATIVE EU/DL
WBC # BLD AUTO: 9.89 K/UL

## 2018-08-06 PROCEDURE — 84484 ASSAY OF TROPONIN QUANT: CPT

## 2018-08-06 PROCEDURE — 80307 DRUG TEST PRSMV CHEM ANLYZR: CPT

## 2018-08-06 PROCEDURE — 82550 ASSAY OF CK (CPK): CPT | Mod: 91

## 2018-08-06 PROCEDURE — 84443 ASSAY THYROID STIM HORMONE: CPT

## 2018-08-06 PROCEDURE — 81003 URINALYSIS AUTO W/O SCOPE: CPT | Mod: 59

## 2018-08-06 PROCEDURE — 84439 ASSAY OF FREE THYROXINE: CPT

## 2018-08-06 PROCEDURE — 85025 COMPLETE CBC W/AUTO DIFF WBC: CPT

## 2018-08-06 PROCEDURE — 84100 ASSAY OF PHOSPHORUS: CPT

## 2018-08-06 PROCEDURE — 85730 THROMBOPLASTIN TIME PARTIAL: CPT

## 2018-08-06 PROCEDURE — 99284 EMERGENCY DEPT VISIT MOD MDM: CPT | Mod: 25

## 2018-08-06 PROCEDURE — 82553 CREATINE MB FRACTION: CPT | Mod: 91

## 2018-08-06 PROCEDURE — 36415 COLL VENOUS BLD VENIPUNCTURE: CPT

## 2018-08-06 PROCEDURE — 83735 ASSAY OF MAGNESIUM: CPT

## 2018-08-06 PROCEDURE — 93010 ELECTROCARDIOGRAM REPORT: CPT | Mod: ,,, | Performed by: INTERNAL MEDICINE

## 2018-08-06 PROCEDURE — 80053 COMPREHEN METABOLIC PANEL: CPT

## 2018-08-06 PROCEDURE — 93005 ELECTROCARDIOGRAM TRACING: CPT

## 2018-08-06 PROCEDURE — 83880 ASSAY OF NATRIURETIC PEPTIDE: CPT

## 2018-08-06 PROCEDURE — 85610 PROTHROMBIN TIME: CPT

## 2018-08-06 PROCEDURE — 25000003 PHARM REV CODE 250: Performed by: SURGERY

## 2018-08-06 RX ORDER — NAPROXEN SODIUM 220 MG/1
81 TABLET, FILM COATED ORAL
Status: COMPLETED | OUTPATIENT
Start: 2018-08-06 | End: 2018-08-06

## 2018-08-06 RX ORDER — CLONAZEPAM 0.5 MG/1
0.5 TABLET ORAL 2 TIMES DAILY PRN
Qty: 10 TABLET | Refills: 0 | Status: SHIPPED | OUTPATIENT
Start: 2018-08-06 | End: 2019-11-25 | Stop reason: SDUPTHER

## 2018-08-06 RX ADMIN — ASPIRIN 81 MG 81 MG: 81 TABLET ORAL at 01:08

## 2018-08-06 NOTE — ED PROVIDER NOTES
[facility]                                                 Chief Complaint  58 y.o. female with Shortness of Breath and Anxiety    History of Present Illness  Gricelda Gil presents to the emergency room with anxiety issues  Patient was seen in the emergency room this week, shortness of breath then  Patient had a completely negative workup including noted CT PE study then  Patient has had sinusitis with postnasal drip, felt short of breath this morning  This triggered a panic attack with anxiety, patient presents 100% oxygenation  Patient has no signs of distress, states that she is feeling anxious on arrival  Patient has stable EKG again, denies chest pain just anxiety, no wheezing    The history is provided by the patient   device was not used during this ER visit     Past Medical History   -- Arthritis     -- Bipolar disorder     -- Depression     -- History of psychiatric care     -- History of psychiatric hospitalization     -- Hyperlipidemia     -- Hypertension     -- Insomnia     -- Lupus     -- Psychiatric problem     -- Self-harming behavior     -- Stroke     -- Suicide attempt     -- Therapy     -- Thyroid disease        Past Surgical History   -- ADENOIDECTOMY       -- CARPAL TUNNEL RELEASE       --  SECTION       -- HYSTERECTOMY       -- NASAL SEPTUM SURGERY       -- Rt shoulder surgery       -- TONSILLECTOMY       -- UVULOPALATOPHARYNGOPLASTY          No Known Allergies     Review of Systems and Physical Exam      Review of Systems  -- Constitution - no fever, denies fatigue, no weakness, no chills  -- Eyes - no tearing or redness, no visual disturbance  -- Ear, Nose - sneezing, nasal congestion and clear discharge   -- Mouth,Throat - no sore throat, no toothache, normal voice, normal swallowing  -- Respiratory - cough and shortness of breath, no SCHWARZ  -- Cardiovascular - denies chest pain, no palpitations, denies claudication  -- Gastrointestinal  - denies abdominal pain, nausea, vomiting, or diarrhea  -- Genitourinary - no dysuria, denies flank pain, no hematuria, no STD risk  -- Musculoskeletal - denies back pain, negative for myalgias and arthralgias   -- Neurological - no headache, denies weakness or seizure; no LOC  -- Skin - denies pallor, rash, or changes in skin. no hives or welts noted  -- Psychiatric - anxiety, denies SI or HI, no psychosis or fractured thought    BP (!) 153/90  Pulse (!) 115   Temp 98.2 °F (36.8 °C)   Resp (!) 24     Physical Exam  -- Nursing note and vitals reviewed  -- Constitutional: Appears well-developed and well-nourished  -- Head: Atraumatic. Normocephalic. No obvious abnormality  -- Eyes: Pupils are equal and reactive to light. Normal conjunctiva and lids  -- Nose: nasal mucosa erythema and edema; clear nasal discharge noted   -- Throat: Mucous membranes moist, pharynx normal, normal tonsils. No lesions   -- Ears: External ears and TM normal bilaterally. Normal hearing and no drainage  -- Neck: Normal range of motion. Neck supple. No masses, trachea midline  -- Cardiac: Normal rate, regular rhythm and normal heart sounds  -- Pulmonary: Normal respiratory effort, breath sounds clear to auscultation  -- Abdominal: Soft, no tenderness. Normal bowel sounds. Normal liver edge  -- Musculoskeletal: Normal range of motion, no effusions. Joints stable   -- Neurological: No focal deficits. Showed good interaction with staff  -- Vascular: Posterior tibial, dorsalis pedis and radial pulses 2+ bilaterally      Emergency Room Course      Lab Results     K 4.1      CO2 23   BUN 21 (H)   CREATININE 1.1      ALKPHOS 114   AST 31   ALT 44   BILITOT 0.4   ALBUMIN 3.8   PROT 7.4   WBC 9.89   HGB 14.0   HCT 43.2      CPK 95   CPK 95   CPKMB 1.5   TROPONINI <0.006   INR 0.9   BNP 14   DDIMER 0.62 (H)   MG 2.2   TSH 10.847 (H)     Urinalysis  -- Urinalysis performed during this ER visit showed no signs of infection      EKG  -- The EKG findings today were without concerning findings from baseline  -- The troponin drawn in the ER today was within normal limits  -- The 2nd troponin drawn in the ER today was within normal limits     Radiology  -- The CT of the head performed in the ER today was negative for acute pathology  -- Chest x-ray showed no infiltrate and showed no acute pathology    Additional Work up    Medications Given  -- PO 81 MG ASA given in today in the ER     Medical Decision Making  -- Diagnosis management comments: 58 y.o. female with shortness of breath today  -- patient had a panic attack at home, seen in the ER this weekend for SOB  -- patient had a negative EKG at that time with 2 sets of negative cardiac enzymes  -- patient had a negative PE study, discharge with sinusitis diagnosis  -- patient had anxiety attack again today associated with a headache  -- negative head CT, stable EKG with 2 sets of negative cardiac enzymes  -- stable chest x-ray with stable metabolic workup again today  -- patient is much better at this time, would like something for anxiety on discharge  -- the patient is also to restart her Synthroid, she stopped taking her thyroid medication  -- TSH 10, patient has been noncompliant, promises thyroid medicine compliance    Diagnosis  -- Anxiety  -- SOB (shortness of breath)    Disposition and Plan  -- Disposition: home  -- Condition: stable  -- Follow-up: Patient to follow up with Major Kimble MD in 1-2 days.  -- I advised the patient that we have found no life threatening condition today  -- At this time, I believe the patient is clinically stable for discharge.   -- The patient acknowledges that close follow up with a MD is required   -- Patient agrees to comply with all instruction and direction given in the ER    This note is dictated on Dragon Natural Speaking word recognition program.  There are word recognition mistakes that are occasionally missed on review.            Michael  XI Stokes MD  08/06/18 [id number]

## 2018-08-06 NOTE — ED NOTES
Received verbal report from CAITLIN Cueto.  Pt in ED room #2A lying in stretcher, HOB 30 degrees. Stretcher is in low, locked position, side rails up x2.  Pt previously connected to continuous cardiac monitor, continuous pulse ox, and automatic BP cuff; alarms set and turned on for monitor, pulse ox and IV pump. Call bell within reach of pt, pt instructed on use, pt verbalizes understanding of call bell use.  The patient is awake, alert and cooperative with a calm affect, patient is aware of environment. Airway is open and patent, respirations are spontaneous, normal respiratory effort and rate noted, skin warm and dry, full ROM in all extremities, appearance: NAD noted, resting comfortably.  Plan of care: family to bedside, observe and reassure, position of comfort, respirations even and unlabored, patient offers no complaints at this time, awaiting additional orders, will continue to monitor

## 2018-08-07 DIAGNOSIS — I10 ESSENTIAL HYPERTENSION: ICD-10-CM

## 2018-08-07 RX ORDER — AMLODIPINE AND BENAZEPRIL HYDROCHLORIDE 5; 40 MG/1; MG/1
CAPSULE ORAL
Qty: 30 CAPSULE | Refills: 0 | Status: SHIPPED | OUTPATIENT
Start: 2018-08-07 | End: 2018-08-10 | Stop reason: SDUPTHER

## 2018-08-09 ENCOUNTER — OFFICE VISIT (OUTPATIENT)
Dept: FAMILY MEDICINE | Facility: CLINIC | Age: 58
End: 2018-08-09
Payer: MEDICARE

## 2018-08-09 VITALS
WEIGHT: 264.31 LBS | DIASTOLIC BLOOD PRESSURE: 74 MMHG | SYSTOLIC BLOOD PRESSURE: 128 MMHG | RESPIRATION RATE: 18 BRPM | BODY MASS INDEX: 42.48 KG/M2 | HEIGHT: 66 IN | HEART RATE: 66 BPM

## 2018-08-09 DIAGNOSIS — E03.9 HYPOTHYROIDISM, UNSPECIFIED TYPE: Primary | ICD-10-CM

## 2018-08-09 DIAGNOSIS — F41.0 PANIC ATTACK: ICD-10-CM

## 2018-08-09 PROCEDURE — 99213 OFFICE O/P EST LOW 20 MIN: CPT | Mod: S$PBB | Performed by: FAMILY MEDICINE

## 2018-08-09 PROCEDURE — 99999 PR PBB SHADOW E&M-EST. PATIENT-LVL III: CPT | Mod: PBBFAC,,, | Performed by: FAMILY MEDICINE

## 2018-08-09 PROCEDURE — 99213 OFFICE O/P EST LOW 20 MIN: CPT | Mod: PBBFAC | Performed by: FAMILY MEDICINE

## 2018-08-09 PROCEDURE — 99999 PR STA SHADOW: CPT | Mod: PBBFAC,,, | Performed by: FAMILY MEDICINE

## 2018-08-09 NOTE — PROGRESS NOTES
Subjective:       Patient ID: Gricelda Gil is a 58 y.o. female.    Chief Complaint: Er follow up    Pt is a 58 y.o. female who presents for evaluation and management of   Encounter Diagnoses   Name Primary?    Panic attack     Hypothyroidism, unspecified type Yes   .TSH noted to be elevated at ED visit. She says she has missed several doses of synthroid, but is now back on it consistently.   Klonopin rx #10 given in ED  She sees [facility] regularly     Doing well on current meds. Denies any side effects. Prevention is up to date.    Review of Systems   Constitutional: Negative for fatigue.   Respiratory: Negative for shortness of breath.    Cardiovascular: Negative for chest pain.       Objective:      Physical Exam   Constitutional: She is oriented to person, place, and time. She appears well-developed and well-nourished.   HENT:   Head: Normocephalic and atraumatic.   Right Ear: External ear normal.   Left Ear: External ear normal.   Nose: Nose normal.   Mouth/Throat: Oropharynx is clear and moist.   Eyes: EOM are normal. Pupils are equal, round, and reactive to light.   Neck: Normal range of motion. Neck supple. No JVD present. No tracheal deviation present. No thyromegaly present.   Cardiovascular: Normal rate, normal heart sounds and intact distal pulses.    No murmur heard.  Pulmonary/Chest: Effort normal and breath sounds normal. No respiratory distress. She has no wheezes. She has no rales. She exhibits no tenderness.   Abdominal: Soft. Bowel sounds are normal. She exhibits no distension and no mass. There is no tenderness. There is no rebound and no guarding.   Musculoskeletal: Normal range of motion. She exhibits no edema or tenderness.   Lymphadenopathy:     She has no cervical adenopathy.   Neurological: She is alert and oriented to person, place, and time. She has normal reflexes. She displays normal reflexes. No cranial nerve deficit. She exhibits normal muscle tone.  Coordination normal.   Skin: Skin is warm and dry. No rash noted. No erythema. No pallor.   Psychiatric: She has a normal mood and affect. Her behavior is normal. Judgment and thought content normal.       Assessment:       1. Hypothyroidism, unspecified type    2. Panic attack        Plan:   Gricelda was seen today for er follow up.    Diagnoses and all orders for this visit:    Hypothyroidism, unspecified type  -     TSH; Future    Panic attack      Problem List Items Addressed This Visit     None      Visit Diagnoses     Hypothyroidism, unspecified type    -  Primary    Panic attack            Repeat TSH in 6 weeks as she is getting back on meds consistently   No Follow-up on file.

## 2018-08-10 DIAGNOSIS — I10 ESSENTIAL HYPERTENSION: ICD-10-CM

## 2018-08-12 RX ORDER — AMLODIPINE AND BENAZEPRIL HYDROCHLORIDE 5; 40 MG/1; MG/1
1 CAPSULE ORAL DAILY
Qty: 30 CAPSULE | Refills: 0 | Status: SHIPPED | OUTPATIENT
Start: 2018-08-12 | End: 2018-10-07 | Stop reason: SDUPTHER

## 2018-09-21 ENCOUNTER — CLINICAL SUPPORT (OUTPATIENT)
Dept: FAMILY MEDICINE | Facility: CLINIC | Age: 58
End: 2018-09-21
Payer: MEDICARE

## 2018-09-21 DIAGNOSIS — Z12.39 BREAST CANCER SCREENING: ICD-10-CM

## 2018-09-21 DIAGNOSIS — E03.9 HYPOTHYROIDISM, UNSPECIFIED TYPE: ICD-10-CM

## 2018-09-21 LAB — TSH SERPL DL<=0.005 MIU/L-ACNC: 2.19 UIU/ML

## 2018-09-21 PROCEDURE — 99211 OFF/OP EST MAY X REQ PHY/QHP: CPT | Mod: PBBFAC

## 2018-09-21 PROCEDURE — 36415 COLL VENOUS BLD VENIPUNCTURE: CPT | Mod: PBBFAC

## 2018-09-21 PROCEDURE — 99999 PR PBB SHADOW E&M-EST. PATIENT-LVL I: CPT | Mod: PBBFAC,,,

## 2018-09-21 PROCEDURE — 99999 PR STA SHADOW: CPT | Mod: PBBFAC,,, | Performed by: FAMILY MEDICINE

## 2018-09-21 PROCEDURE — 84443 ASSAY THYROID STIM HORMONE: CPT

## 2018-09-21 NOTE — PROGRESS NOTES
----- Message from Farrukh Renae DO sent at 8/2/2018  5:35 PM CDT -----  Please notify the patient of normal results.  Follow-up as planned.   OUTPATIENT LABWORK OUTPATIENT LABWORK

## 2018-10-07 DIAGNOSIS — I10 ESSENTIAL HYPERTENSION: ICD-10-CM

## 2018-10-08 RX ORDER — AMLODIPINE AND BENAZEPRIL HYDROCHLORIDE 5; 40 MG/1; MG/1
CAPSULE ORAL
Qty: 30 CAPSULE | Refills: 0 | Status: SHIPPED | OUTPATIENT
Start: 2018-10-08 | End: 2018-11-05 | Stop reason: SDUPTHER

## 2018-10-12 DIAGNOSIS — Z12.11 COLON CANCER SCREENING: ICD-10-CM

## 2018-11-05 DIAGNOSIS — I10 ESSENTIAL HYPERTENSION: ICD-10-CM

## 2018-11-05 RX ORDER — AMLODIPINE AND BENAZEPRIL HYDROCHLORIDE 5; 40 MG/1; MG/1
CAPSULE ORAL
Qty: 30 CAPSULE | Refills: 0 | Status: SHIPPED | OUTPATIENT
Start: 2018-11-05 | End: 2018-12-07 | Stop reason: SDUPTHER

## 2018-11-25 RX ORDER — DICLOFENAC SODIUM 75 MG/1
TABLET, DELAYED RELEASE ORAL
Qty: 60 TABLET | Refills: 1 | Status: SHIPPED | OUTPATIENT
Start: 2018-11-25 | End: 2019-01-15 | Stop reason: SDUPTHER

## 2018-12-07 DIAGNOSIS — I10 ESSENTIAL HYPERTENSION: ICD-10-CM

## 2018-12-07 DIAGNOSIS — E03.4 HYPOTHYROIDISM DUE TO ACQUIRED ATROPHY OF THYROID: ICD-10-CM

## 2018-12-09 RX ORDER — LEVOTHYROXINE SODIUM 112 UG/1
TABLET ORAL
Qty: 30 TABLET | Refills: 1 | Status: SHIPPED | OUTPATIENT
Start: 2018-12-09 | End: 2019-02-20 | Stop reason: SDUPTHER

## 2018-12-09 RX ORDER — AMLODIPINE AND BENAZEPRIL HYDROCHLORIDE 5; 40 MG/1; MG/1
CAPSULE ORAL
Qty: 30 CAPSULE | Refills: 0 | Status: SHIPPED | OUTPATIENT
Start: 2018-12-09 | End: 2019-01-03 | Stop reason: SDUPTHER

## 2018-12-21 ENCOUNTER — OFFICE VISIT (OUTPATIENT)
Dept: FAMILY MEDICINE | Facility: CLINIC | Age: 58
End: 2018-12-21
Payer: MEDICARE

## 2018-12-21 VITALS
RESPIRATION RATE: 16 BRPM | WEIGHT: 270.5 LBS | BODY MASS INDEX: 44.33 KG/M2 | HEART RATE: 96 BPM | DIASTOLIC BLOOD PRESSURE: 82 MMHG | SYSTOLIC BLOOD PRESSURE: 116 MMHG

## 2018-12-21 DIAGNOSIS — M54.50 CHRONIC MIDLINE LOW BACK PAIN WITHOUT SCIATICA: Primary | ICD-10-CM

## 2018-12-21 DIAGNOSIS — G89.29 CHRONIC MIDLINE LOW BACK PAIN WITHOUT SCIATICA: Primary | ICD-10-CM

## 2018-12-21 PROCEDURE — 99213 OFFICE O/P EST LOW 20 MIN: CPT | Mod: S$PBB | Performed by: FAMILY MEDICINE

## 2018-12-21 PROCEDURE — 99213 OFFICE O/P EST LOW 20 MIN: CPT | Mod: PBBFAC | Performed by: FAMILY MEDICINE

## 2018-12-21 PROCEDURE — 99999 PR STA SHADOW: CPT | Mod: PBBFAC,,, | Performed by: FAMILY MEDICINE

## 2018-12-21 PROCEDURE — 99999 PR PBB SHADOW E&M-EST. PATIENT-LVL III: CPT | Mod: PBBFAC,,, | Performed by: FAMILY MEDICINE

## 2018-12-21 NOTE — PROGRESS NOTES
Subjective:       Patient ID: Gricelda Gil is a 58 y.o. female.    Chief Complaint: Back Pain (doesn't hurt at rest)    Pt is a 58 y.o. female who presents for evaluation and management of   Encounter Diagnosis   Name Primary?    Chronic midline low back pain without sciatica Yes   .off and on for some time now. 3-4 years. Plan films last year---facet arthropathy   Gradually getting worse   She says it is worse standing or going up stairs, and down stairs   Better laying down or sitting   No recent injury   Taking ASA or ibuprofen. Helps a little     Doing well on current meds. Denies any side effects. Prevention is up to date.    Review of Systems   Constitutional: Negative for fever.   Cardiovascular: Negative for chest pain.   Gastrointestinal: Negative for abdominal pain.   Genitourinary: Negative for dysuria, hematuria and pelvic pain.   Musculoskeletal: Positive for back pain.   Neurological: Positive for weakness. Negative for numbness and headaches.       Objective:      Physical Exam   Constitutional: She is oriented to person, place, and time. She appears well-developed and well-nourished.   HENT:   Head: Normocephalic and atraumatic.   Right Ear: External ear normal.   Left Ear: External ear normal.   Nose: Nose normal.   Mouth/Throat: Oropharynx is clear and moist.   Eyes: EOM are normal. Pupils are equal, round, and reactive to light.   Neck: Normal range of motion. Neck supple. No JVD present. No tracheal deviation present. No thyromegaly present.   Cardiovascular: Normal rate, normal heart sounds and intact distal pulses.   No murmur heard.  Pulmonary/Chest: Effort normal and breath sounds normal. No respiratory distress. She has no wheezes. She has no rales. She exhibits no tenderness.   Abdominal: Soft. Bowel sounds are normal. She exhibits no distension and no mass. There is no tenderness. There is no rebound and no guarding.   Musculoskeletal: Normal range of motion. She exhibits  tenderness. She exhibits no edema.   TTP midline lumbar spine at around L4/5    Lymphadenopathy:     She has no cervical adenopathy.   Neurological: She is alert and oriented to person, place, and time. She has normal reflexes. She displays normal reflexes. No cranial nerve deficit. She exhibits normal muscle tone. Coordination normal.   Skin: Skin is warm and dry. No rash noted. No erythema. No pallor.   Psychiatric: She has a normal mood and affect. Her behavior is normal. Judgment and thought content normal.       Assessment:       1. Chronic midline low back pain without sciatica        Plan:   Gricelda was seen today for back pain.    Diagnoses and all orders for this visit:    Chronic midline low back pain without sciatica  -     Ambulatory referral to Pain Clinic      Problem List Items Addressed This Visit     None      Visit Diagnoses     Chronic midline low back pain without sciatica    -  Primary    Relevant Orders    Ambulatory referral to Pain Clinic          No Follow-up on file.      Answers for HPI/ROS submitted by the patient on 12/17/2018   Back pain  Chronicity: recurrent  Onset: more than 1 year ago  Frequency: 2 to 4 times per day  Progression since onset: rapidly worsening  Pain location: sacro-iliac  Pain quality: burning, shooting, stabbing  Radiates to: does not radiate  Pain - numeric: 5/10  Pain is: worse during the day  Aggravated by: standing, twisting  Stiffness is present: at night  bladder incontinence: No  bowel incontinence: No  leg pain: No  paresis: No  paresthesias: No  perianal numbness: No  tingling: Yes  weight loss: No  genital pain: No  Pain severity: moderate  Treatments tried: NSAIDs  Improvement on treatment: mild

## 2019-01-03 DIAGNOSIS — I10 ESSENTIAL HYPERTENSION: ICD-10-CM

## 2019-01-06 RX ORDER — AMLODIPINE AND BENAZEPRIL HYDROCHLORIDE 5; 40 MG/1; MG/1
CAPSULE ORAL
Qty: 30 CAPSULE | Refills: 0 | Status: SHIPPED | OUTPATIENT
Start: 2019-01-06 | End: 2019-02-13 | Stop reason: SDUPTHER

## 2019-01-15 RX ORDER — DICLOFENAC SODIUM 75 MG/1
TABLET, DELAYED RELEASE ORAL
Qty: 60 TABLET | Refills: 1 | Status: SHIPPED | OUTPATIENT
Start: 2019-01-15 | End: 2019-03-17 | Stop reason: SDUPTHER

## 2019-01-25 ENCOUNTER — OFFICE VISIT (OUTPATIENT)
Dept: PAIN MEDICINE | Facility: CLINIC | Age: 59
End: 2019-01-25
Attending: PAIN MEDICINE
Payer: MEDICARE

## 2019-01-25 VITALS
RESPIRATION RATE: 18 BRPM | SYSTOLIC BLOOD PRESSURE: 120 MMHG | HEIGHT: 66 IN | DIASTOLIC BLOOD PRESSURE: 84 MMHG | BODY MASS INDEX: 43.47 KG/M2 | WEIGHT: 270.5 LBS

## 2019-01-25 DIAGNOSIS — M51.36 DDD (DEGENERATIVE DISC DISEASE), LUMBAR: Primary | ICD-10-CM

## 2019-01-25 DIAGNOSIS — M48.062 SPINAL STENOSIS OF LUMBAR REGION WITH NEUROGENIC CLAUDICATION: ICD-10-CM

## 2019-01-25 DIAGNOSIS — M47.816 LUMBAR SPONDYLOSIS: ICD-10-CM

## 2019-01-25 PROCEDURE — 99214 OFFICE O/P EST MOD 30 MIN: CPT | Mod: PBBFAC | Performed by: PAIN MEDICINE

## 2019-01-25 PROCEDURE — 99204 OFFICE O/P NEW MOD 45 MIN: CPT | Mod: S$PBB | Performed by: PAIN MEDICINE

## 2019-01-25 PROCEDURE — 99999 PR STA SHADOW: ICD-10-PCS | Mod: PBBFAC,,, | Performed by: PAIN MEDICINE

## 2019-01-25 PROCEDURE — 99999 PR PBB SHADOW E&M-EST. PATIENT-LVL IV: CPT | Mod: PBBFAC,,, | Performed by: PAIN MEDICINE

## 2019-01-25 PROCEDURE — 99999 PR STA SHADOW: CPT | Mod: PBBFAC,,, | Performed by: PAIN MEDICINE

## 2019-01-25 NOTE — PROGRESS NOTES
Subjective:     Patient ID: Gricelda Gil is a 58 y.o. female    Chief Complaint: Low-back Pain (patient experiences due to chronic midline low back w/o sciatica- patient experiences pain when walking and standing for extended periods of time- treatment ww/ medication)      Referred by: Major Kimble MD      HPI:    Initial Encounter (1/25/19):  Gricelda Gil is a 58 y.o. female who presents today with chronic low back pain. This pain has been present for about 1 year.  She denies any specific inciting event or injury.  The pain is located in the midline of the lower lumbar spine.  It does not radiate.  She denies any numbness, tingling, weakness, bowel or bladder dysfunction associated with the pain. The pain is not constant.  The pain is present with activity.  She can walk for about 10-15 minutes before needing to rest.  After about 2 min of rest she is able to resume walking.  When at the grocery store she feels as though leaning forward on a cart allows her to walk further.  She denies any increased pain with coughing, sneezing or bowel movements.   This pain is described in detail below.    Physical Therapy:  No    Non-pharmacologic Treatment:  Rest helps         · TENS?  No    Pain Medications:         · Currently taking:  Ibuprofen, Tylenol    · Has tried in the past:      · Has not tried:  Opioids,  Muscle relaxants, TCAs, SNRIs, anticonvulsants, topical creams    Blood thinners:  None    Interventional Therapies:   9/16/16 C7-T1 CERVICAL EPIDURAL STEROID INJECTION   4/29/16 C7-T1 CERVICAL EPIDURAL STEROID INJECTION,   1/6/12, 11/17/11, 10/5/11, 6/27/11, 3/11/10 Cervical Epidural Steroid Injection        Relevant Surgeries:  None    Affecting sleep?  Yes    Affecting daily activities? yes    Depressive symptoms? no          · SI/HI? No    Work status: Retired    Pain Scores:    Best:      1 /10  Worst:     9/10  Usually:   3/10  Today:    3/10    Review of Systems   Constitutional: Negative  for activity change, appetite change, chills, fatigue, fever and unexpected weight change.   HENT: Negative for hearing loss.    Eyes: Negative for visual disturbance.   Respiratory: Negative for chest tightness and shortness of breath.    Cardiovascular: Negative for chest pain.   Gastrointestinal: Negative for abdominal pain, constipation, diarrhea, nausea and vomiting.   Genitourinary: Negative for difficulty urinating.   Musculoskeletal: Positive for back pain, gait problem, myalgias, neck pain and neck stiffness.   Skin: Negative for rash.   Neurological: Negative for dizziness, weakness, light-headedness, numbness and headaches.   Psychiatric/Behavioral: Positive for sleep disturbance. Negative for hallucinations and suicidal ideas. The patient is not nervous/anxious.        Past Medical History:   Diagnosis Date    Arthritis     Bipolar disorder     Depression     History of psychiatric care     History of psychiatric hospitalization     Hyperlipidemia     Hypertension     Insomnia     Lupus     Psychiatric problem     Self-harming behavior     Stroke     TIA - neck    Suicide attempt     Therapy     Thyroid disease        Past Surgical History:   Procedure Laterality Date    ADENOIDECTOMY      CARPAL TUNNEL RELEASE       SECTION  1980    HYSTERECTOMY      INJECTION-STEROID-EPIDURAL-CERVICAL (C7-T1) N/A 2016    [facility]    INJECTION-STEROID-EPIDURAL-CERVICAL (C7-T1) N/A 2016    [facility]    NASAL SEPTUM SURGERY  2014    Rt shoulder surgery Right 2004    scope    TONSILLECTOMY  2014    UVULOPALATOPHARYNGOPLASTY  2014       Social History     Socioeconomic History    Marital status: Single     Spouse name: Not on file    Number of children: 3    Years of education: Not on file    Highest education level: Not on file   Social Needs    Financial resource strain: Not on file    Food  "insecurity - worry: Not on file    Food insecurity - inability: Not on file    Transportation needs - medical: Not on file    Transportation needs - non-medical: Not on file   Occupational History    Not on file   Tobacco Use    Smoking status: Former Smoker     Types: Cigarettes     Last attempt to quit: 2009     Years since quittin.4    Smokeless tobacco: Never Used   Substance and Sexual Activity    Alcohol use: No    Drug use: No    Sexual activity: Not Currently     Comment: , single   Other Topics Concern    Patient feels they ought to cut down on drinking/drug use Not Asked    Patient annoyed by others criticizing their drinking/drug use Not Asked    Patient has felt bad or guilty about drinking/drug use Not Asked    Patient has had a drink/used drugs as an eye opener in the AM Not Asked   Social History Narrative    Not on file       Review of patient's allergies indicates:  No Known Allergies    Current Outpatient Medications on File Prior to Visit   Medication Sig Dispense Refill    amlodipine-benazepril (LOTREL) 5-40 mg per capsule TAKE 1 CAPSULE BY MOUTH EVERY DAY 30 capsule 0    clonazePAM (KLONOPIN) 0.5 MG tablet Take 1 tablet (0.5 mg total) by mouth 2 (two) times daily as needed for Anxiety. 10 tablet 0    diclofenac (VOLTAREN) 75 MG EC tablet TAKE 1 TABLET (75 MG TOTAL) BY MOUTH 2 (TWO) TIMES DAILY. 60 tablet 1    ibuprofen (ADVIL,MOTRIN) 200 MG tablet Take 200 mg by mouth as needed for Pain.      lamotrigine (LAMICTAL) 100 MG tablet Take 100 mg by mouth once daily.   4    levothyroxine (SYNTHROID) 112 MCG tablet TAKE 1 TABLET (112 MCG TOTAL) BY MOUTH ONCE DAILY. 30 tablet 1    quetiapine (SEROQUEL) 400 MG tablet Take 200 mg by mouth nightly.   3    zaleplon (SONATA) 10 MG capsule Take 10 mg by mouth every evening.       No current facility-administered medications on file prior to visit.        Objective:      /84   Resp 18   Ht 5' 5.5" (1.664 m)   Wt " 122.7 kg (270 lb 8.1 oz)   BMI 44.33 kg/m²     Exam:  GEN:  Well developed, well nourished.  No acute distress.  Normal pain behavior.  HEENT:  No trauma.  Mucous membranes moist.  Nares patent bilaterally.  PSYCH: Normal affect. Thought content appropriate.  CHEST:  Breathing symmetric.  No audible wheezing.  ABD: Soft, non-distended.  SKIN:  Warm, pink, dry.  No rash on exposed areas.    EXT:  No cyanosis, clubbing, or edema.  No color change or changes in nail or hair growth.  NEURO/MUSCULOSKELETAL:  Fully alert, oriented, and appropriate. Speech normal morenita. No cranial nerve deficits.   Gait:  Normal.  No trendelenburg sign bilaterally.   Motor Strength: 5/5 motor strength throughout lower extremities.   Sensory:  No sensory deficit in the lower extremities.   Reflexes:  2 + and symmetric throughout.  Downgoing Babinski's bilaterally.  No clonus or spasticity.  L-Spine:  Full ROM with pain on flexion.  Negative facet loading bilaterally.  Negative SLR bilaterally.    SI Joint/Hip:  Negative JARRETT bilaterally.  Negative FADIR bilaterally.   TTP over lumbar paraspinals, bilateral SI joints, hips, piriformis muscles, or GTB.    TTP over midline lower lumbar spine        Imaging:  Narrative     Lumbar spine, AP and lateral: There is a mild dextroscoliotic curvature of the lumbar spine.  Vertebral body heights are maintained.  There is minimal disc space narrowing with end plate sclerosis throughout.  There is facet arthropathy the lower lumbar spine.  No fracture or subluxation is identified.  Vascular calcifications are noted.   Impression      As above.      Electronically signed by: Sera Bergeron MD  Date: 02/06/17  Time: 13:15          Assessment:       Encounter Diagnoses   Name Primary?    DDD (degenerative disc disease), lumbar Yes    Lumbar spondylosis     Spinal stenosis of lumbar region with neurogenic claudication          Plan:       Gricelda was seen today for low-back pain.    Diagnoses and all  orders for this visit:    DDD (degenerative disc disease), lumbar  -     Ambulatory Referral to Physical Therapy  -     X-Ray Lumbar Spine Flexion And Extension Only; Future  -     MRI Lumbar Spine Without Contrast; Future    Lumbar spondylosis  -     Ambulatory Referral to Physical Therapy  -     X-Ray Lumbar Spine Flexion And Extension Only; Future  -     MRI Lumbar Spine Without Contrast; Future    Spinal stenosis of lumbar region with neurogenic claudication  -     Ambulatory Referral to Physical Therapy  -     X-Ray Lumbar Spine Flexion And Extension Only; Future  -     MRI Lumbar Spine Without Contrast; Future        Gricelda Gil is a 58 y.o. female with chronic midline low back pain. Low suspicion for facet or sacroiliac etiology.  Low suspicion for radiculopathy.  Pain most consistent with neurogenic claudication..    1.  Pertinent imaging studies reviewed by me. Imaging results were discussed with patient.  2.  Lumbar MRI to investigate for spinal stenosis.  3.  Lumbar flexion extension x-rays to rule out spondylolisthesis.  4.  Refer to PT for ROM, strengthening, stretching and HEP.  5.  Return to clinic in 8 weeks.  At that time we will discuss imaging results and efficacy of physical therapy.  May consider interlaminar epidural steroid injection if appropriate.

## 2019-01-25 NOTE — LETTER
January 25, 2019      Major Kimble MD  [address]  [facility] [id number] [place] [address] - Pain Management  [address] [place] [id number]  Phone: [phone]  Fax: [phone]          Patient: Gricelda Gil   MR Number: [id number]   YOB: 1960   Date of Visit: 1/25/2019       Dear Dr. Major Kimble:    Thank you for referring Gricelda Gil to me for evaluation. Attached you will find relevant portions of my assessment and plan of care.    If you have questions, please do not hesitate to call me. I look forward to following Gricelda Gil along with you.    Sincerely,    Real Fitch Jr., MD    Enclosure  CC:  No Recipients    If you would like to receive this communication electronically, please contact [email or web address] or [phone] to request more information on [employer] Link access.    For providers and/or their staff who would like to refer a patient to [facility], please contact us through our one-stop-shop provider referral line, [facility], [place], at [phone].    If you feel you have received this communication in error or would no longer like to receive these types of communications, please e-mail [email or web address]

## 2019-01-29 ENCOUNTER — HOSPITAL ENCOUNTER (OUTPATIENT)
Dept: RADIOLOGY | Facility: HOSPITAL | Age: 59
Discharge: HOME | End: 2019-01-29
Attending: PAIN MEDICINE
Payer: MEDICARE

## 2019-01-29 DIAGNOSIS — M47.816 LUMBAR SPONDYLOSIS: ICD-10-CM

## 2019-01-29 DIAGNOSIS — M48.062 SPINAL STENOSIS OF LUMBAR REGION WITH NEUROGENIC CLAUDICATION: ICD-10-CM

## 2019-01-29 DIAGNOSIS — M51.36 DDD (DEGENERATIVE DISC DISEASE), LUMBAR: ICD-10-CM

## 2019-01-29 PROCEDURE — 72148 MRI LUMBAR SPINE W/O DYE: CPT | Mod: TC

## 2019-01-29 PROCEDURE — 72120 X-RAY BEND ONLY L-S SPINE: CPT | Mod: TC

## 2019-01-29 PROCEDURE — 72148 MRI LUMBAR SPINE W/O DYE: CPT | Mod: 26,,, | Performed by: RADIOLOGY

## 2019-01-29 PROCEDURE — 72148 MRI LUMBAR SPINE WITHOUT CONTRAST: ICD-10-PCS | Mod: 26,,, | Performed by: RADIOLOGY

## 2019-01-29 PROCEDURE — 72120 XR LUMBAR SPINE FLEXION AND EXTENSION ONLY: ICD-10-PCS | Mod: 26,,, | Performed by: RADIOLOGY

## 2019-01-29 PROCEDURE — 72120 X-RAY BEND ONLY L-S SPINE: CPT | Mod: 26,,, | Performed by: RADIOLOGY

## 2019-02-05 ENCOUNTER — TELEPHONE (OUTPATIENT)
Dept: FAMILY MEDICINE | Facility: CLINIC | Age: 59
End: 2019-02-05

## 2019-02-06 ENCOUNTER — OFFICE VISIT (OUTPATIENT)
Dept: FAMILY MEDICINE | Facility: CLINIC | Age: 59
End: 2019-02-06
Payer: MEDICARE

## 2019-02-06 VITALS
WEIGHT: 272 LBS | TEMPERATURE: 99 F | DIASTOLIC BLOOD PRESSURE: 74 MMHG | BODY MASS INDEX: 45.32 KG/M2 | HEART RATE: 80 BPM | RESPIRATION RATE: 20 BRPM | HEIGHT: 65 IN | OXYGEN SATURATION: 97 % | SYSTOLIC BLOOD PRESSURE: 130 MMHG

## 2019-02-06 DIAGNOSIS — J10.1 INFLUENZA A: ICD-10-CM

## 2019-02-06 DIAGNOSIS — R50.9 FEVER, UNSPECIFIED FEVER CAUSE: Primary | ICD-10-CM

## 2019-02-06 DIAGNOSIS — R06.2 WHEEZING: ICD-10-CM

## 2019-02-06 DIAGNOSIS — R05.9 COUGH: ICD-10-CM

## 2019-02-06 LAB
CTP QC/QA: YES
POC MOLECULAR INFLUENZA A AGN: POSITIVE
POC MOLECULAR INFLUENZA B AGN: NEGATIVE

## 2019-02-06 PROCEDURE — 99999 PR PBB SHADOW E&M-EST. PATIENT-LVL IV: CPT | Mod: PBBFAC,,, | Performed by: NURSE PRACTITIONER

## 2019-02-06 PROCEDURE — 87502 INFLUENZA DNA AMP PROBE: CPT | Mod: PBBFAC | Performed by: NURSE PRACTITIONER

## 2019-02-06 PROCEDURE — 99213 OFFICE O/P EST LOW 20 MIN: CPT | Mod: S$PBB | Performed by: NURSE PRACTITIONER

## 2019-02-06 PROCEDURE — 99999 PR STA SHADOW: CPT | Mod: PBBFAC,,, | Performed by: NURSE PRACTITIONER

## 2019-02-06 PROCEDURE — 99999 PR STA SHADOW: CPT | Mod: PBBFAC,,,

## 2019-02-06 PROCEDURE — 99999 PR STA SHADOW: ICD-10-PCS | Mod: PBBFAC,,,

## 2019-02-06 PROCEDURE — 99214 OFFICE O/P EST MOD 30 MIN: CPT | Mod: PBBFAC | Performed by: NURSE PRACTITIONER

## 2019-02-06 PROCEDURE — 99999 POCT INFLUENZA A/B MOLECULAR: CPT | Mod: PBBFAC,,, | Performed by: NURSE PRACTITIONER

## 2019-02-06 PROCEDURE — 96372 THER/PROPH/DIAG INJ SC/IM: CPT | Mod: PBBFAC

## 2019-02-06 PROCEDURE — 99999 POCT INFLUENZA A/B MOLECULAR: ICD-10-PCS | Mod: PBBFAC,,, | Performed by: NURSE PRACTITIONER

## 2019-02-06 RX ORDER — METHYLPREDNISOLONE ACETATE 40 MG/ML
60 INJECTION, SUSPENSION INTRA-ARTICULAR; INTRALESIONAL; INTRAMUSCULAR; SOFT TISSUE
Status: COMPLETED | OUTPATIENT
Start: 2019-02-06 | End: 2019-02-06

## 2019-02-06 RX ORDER — OSELTAMIVIR PHOSPHATE 75 MG/1
75 CAPSULE ORAL 2 TIMES DAILY
Qty: 10 CAPSULE | Refills: 0 | Status: SHIPPED | OUTPATIENT
Start: 2019-02-06 | End: 2019-02-11

## 2019-02-06 RX ORDER — PROMETHAZINE HYDROCHLORIDE AND CODEINE PHOSPHATE 6.25; 1 MG/5ML; MG/5ML
5 SOLUTION ORAL EVERY 4 HOURS PRN
Qty: 180 ML | Refills: 0 | Status: SHIPPED | OUTPATIENT
Start: 2019-02-06 | End: 2019-02-16

## 2019-02-06 RX ORDER — ALBUTEROL SULFATE 90 UG/1
2 AEROSOL, METERED RESPIRATORY (INHALATION) EVERY 6 HOURS PRN
Qty: 18 G | Refills: 0 | Status: SHIPPED | OUTPATIENT
Start: 2019-02-06 | End: 2019-03-06 | Stop reason: SDUPTHER

## 2019-02-06 RX ADMIN — METHYLPREDNISOLONE ACETATE 60 MG: 40 INJECTION, SUSPENSION INTRA-ARTICULAR; INTRALESIONAL; INTRAMUSCULAR; SOFT TISSUE at 03:02

## 2019-02-06 NOTE — PROGRESS NOTES
Subjective:       Patient ID: Gricelda Gil is a 59 y.o. female.    Chief Complaint: Cough; Nasal Congestion; Chest Pain; Headache; Sore Throat; Fever; Pain (body ); Nausea; and Otalgia    Fever    This is a new problem. The current episode started in the past 7 days. The problem occurs 2 to 4 times per day. The problem has been gradually worsening. The maximum temperature noted was 102 to 102.9 F. The temperature was taken using a tympanic thermometer. Associated symptoms include congestion, coughing, ear pain, headaches, muscle aches, nausea, sleepiness, a sore throat, vomiting and wheezing. Pertinent negatives include no abdominal pain, chest pain, diarrhea, rash or urinary pain. She has tried NSAIDs, acetaminophen and fluids for the symptoms. The treatment provided no relief.     Review of Systems   Constitutional: Positive for appetite change, fatigue and fever.   HENT: Positive for congestion, ear pain and sore throat.    Eyes: Negative.  Negative for visual disturbance.   Respiratory: Positive for cough and wheezing. Negative for shortness of breath.    Cardiovascular: Negative.  Negative for chest pain.   Gastrointestinal: Positive for nausea and vomiting. Negative for abdominal pain and diarrhea.   Endocrine: Negative.    Genitourinary: Negative.  Negative for difficulty urinating, dysuria and urgency.   Musculoskeletal: Negative.  Negative for arthralgias and myalgias.   Skin: Negative.  Negative for color change and rash.   Allergic/Immunologic: Negative.    Neurological: Positive for headaches. Negative for dizziness.   Hematological: Negative.    Psychiatric/Behavioral: Negative.  Negative for sleep disturbance.   All other systems reviewed and are negative.      Objective:      Physical Exam   Constitutional: She is oriented to person, place, and time. She appears well-developed and well-nourished.   HENT:   Head: Normocephalic and atraumatic.   Right Ear: Tympanic membrane and external ear  normal.   Left Ear: Tympanic membrane and external ear normal.   Nose: Mucosal edema and rhinorrhea present.   Mouth/Throat: Uvula is midline and oropharynx is clear and moist.   Eyes: Conjunctivae are normal. Pupils are equal, round, and reactive to light.   Neck: Trachea normal and normal range of motion. Neck supple. No thyromegaly present.   Cardiovascular: Normal rate, regular rhythm, S1 normal, S2 normal and normal heart sounds.   No murmur heard.  Pulmonary/Chest: Effort normal. No respiratory distress. She has wheezes (scattered).   Abdominal: Soft. Normal appearance.   Musculoskeletal: Normal range of motion.   Lymphadenopathy:     She has no cervical adenopathy.   Neurological: She is alert and oriented to person, place, and time.   Skin: Skin is warm, dry and intact.   Psychiatric: She has a normal mood and affect. Her speech is normal.   Nursing note and vitals reviewed.      Assessment:       1. Fever, unspecified fever cause    2. Cough    3. Influenza A    4. Wheezing        Plan:     Gricelda was seen today for cough, nasal congestion, chest pain, headache, sore throat, fever, pain, nausea and otalgia.    Diagnoses and all orders for this visit:    Fever, unspecified fever cause  -     POCT Influenza A/B Molecular - positive A    Cough  -     POCT Influenza A/B Molecular - positive A  -     promethazine-codeine 6.25-10 mg/5 ml (PHENERGAN WITH CODEINE) 6.25-10 mg/5 mL syrup; Take 5 mLs by mouth every 4 (four) hours as needed for Cough.    Influenza A  -     oseltamivir (TAMIFLU) 75 MG capsule; Take 1 capsule (75 mg total) by mouth 2 (two) times daily. for 5 days  -     methylPREDNISolone acetate injection 60 mg    Wheezing  -     albuterol (PROVENTIL/VENTOLIN HFA) 90 mcg/actuation inhaler; Inhale 2 puffs into the lungs every 6 (six) hours as needed for Wheezing.  -     methylPREDNISolone acetate injection 60 mg    RTC PRN    Education given

## 2019-02-08 ENCOUNTER — TELEPHONE (OUTPATIENT)
Dept: FAMILY MEDICINE | Facility: CLINIC | Age: 59
End: 2019-02-08

## 2019-02-08 NOTE — TELEPHONE ENCOUNTER
Patient states she picked up Tamiflu on 2/07/2019 in the morning.     Notified of recommendations, verbalized understanding.

## 2019-02-08 NOTE — TELEPHONE ENCOUNTER
----- Message from Mahogany Giles sent at 2019  2:02 PM CST -----  Contact: Self  Gricelda Gil  MRN: [id number]  : 1960  PCP: Major Kimble  Home Phone      [phone]  Work Phone      Not on file.  Mobile          [phone]      MESSAGE:   Patient is calling to inform Ms. Moore that she is not feeling better, still running fever coughing a lot and gagging and dry heaving.    Phone: [phone]

## 2019-02-08 NOTE — TELEPHONE ENCOUNTER
Patient seen per Teresa Ball NP on 2/06/2018. Remains with low grade fever. States last night had temp of 102.0.  States she feels worse in the evening. Remains with dry hacky cough. Also remains with sinus pressure, head ache, and chest congestion.    Pharmacy: [facility] in [facility]

## 2019-02-13 ENCOUNTER — OFFICE VISIT (OUTPATIENT)
Dept: FAMILY MEDICINE | Facility: CLINIC | Age: 59
End: 2019-02-13
Payer: MEDICARE

## 2019-02-13 ENCOUNTER — APPOINTMENT (OUTPATIENT)
Dept: RADIOLOGY | Facility: CLINIC | Age: 59
End: 2019-02-13
Attending: FAMILY MEDICINE
Payer: MEDICARE

## 2019-02-13 VITALS
SYSTOLIC BLOOD PRESSURE: 130 MMHG | DIASTOLIC BLOOD PRESSURE: 84 MMHG | WEIGHT: 266.63 LBS | RESPIRATION RATE: 18 BRPM | HEART RATE: 74 BPM | HEIGHT: 66 IN | BODY MASS INDEX: 42.85 KG/M2

## 2019-02-13 DIAGNOSIS — E03.9 HYPOTHYROIDISM, UNSPECIFIED TYPE: ICD-10-CM

## 2019-02-13 DIAGNOSIS — J40 BRONCHITIS: ICD-10-CM

## 2019-02-13 DIAGNOSIS — E78.5 DYSLIPIDEMIA: ICD-10-CM

## 2019-02-13 DIAGNOSIS — R50.9 FEVER, UNSPECIFIED FEVER CAUSE: Primary | ICD-10-CM

## 2019-02-13 DIAGNOSIS — R50.9 FEVER, UNSPECIFIED FEVER CAUSE: ICD-10-CM

## 2019-02-13 DIAGNOSIS — I10 ESSENTIAL HYPERTENSION: ICD-10-CM

## 2019-02-13 DIAGNOSIS — R05.9 COUGH: ICD-10-CM

## 2019-02-13 DIAGNOSIS — J11.1 INFLUENZA: ICD-10-CM

## 2019-02-13 LAB
ALBUMIN SERPL BCP-MCNC: 3.6 G/DL
ALP SERPL-CCNC: 121 U/L
ALT SERPL W/O P-5'-P-CCNC: 34 U/L
ANION GAP SERPL CALC-SCNC: 9 MMOL/L
AST SERPL-CCNC: 33 U/L
BASOPHILS # BLD AUTO: 0.1 K/UL
BASOPHILS NFR BLD: 1.2 %
BILIRUB SERPL-MCNC: 0.7 MG/DL
BUN SERPL-MCNC: 16 MG/DL
CALCIUM SERPL-MCNC: 9.8 MG/DL
CHLORIDE SERPL-SCNC: 103 MMOL/L
CO2 SERPL-SCNC: 27 MMOL/L
CREAT SERPL-MCNC: 1 MG/DL
DIFFERENTIAL METHOD: ABNORMAL
EOSINOPHIL # BLD AUTO: 0.3 K/UL
EOSINOPHIL NFR BLD: 3.5 %
ERYTHROCYTE [DISTWIDTH] IN BLOOD BY AUTOMATED COUNT: 14.8 %
EST. GFR  (AFRICAN AMERICAN): >60 ML/MIN/1.73 M^2
EST. GFR  (NON AFRICAN AMERICAN): >60 ML/MIN/1.73 M^2
GLUCOSE SERPL-MCNC: 110 MG/DL
HCT VFR BLD AUTO: 44.3 %
HGB BLD-MCNC: 13.9 G/DL
LYMPHOCYTES # BLD AUTO: 2 K/UL
LYMPHOCYTES NFR BLD: 23.6 %
MCH RBC QN AUTO: 27.8 PG
MCHC RBC AUTO-ENTMCNC: 31.4 G/DL
MCV RBC AUTO: 89 FL
MONOCYTES # BLD AUTO: 1.2 K/UL
MONOCYTES NFR BLD: 14.5 %
NEUTROPHILS # BLD AUTO: 4.8 K/UL
NEUTROPHILS NFR BLD: 57.2 %
PLATELET # BLD AUTO: 306 K/UL
PMV BLD AUTO: 10.8 FL
POTASSIUM SERPL-SCNC: 4.4 MMOL/L
PROT SERPL-MCNC: 7.6 G/DL
RBC # BLD AUTO: 5 M/UL
SODIUM SERPL-SCNC: 139 MMOL/L
WBC # BLD AUTO: 8.39 K/UL

## 2019-02-13 PROCEDURE — 99999 PR PBB SHADOW E&M-EST. PATIENT-LVL IV: CPT | Mod: PBBFAC,,, | Performed by: FAMILY MEDICINE

## 2019-02-13 PROCEDURE — 99999 PR STA SHADOW: CPT | Mod: PBBFAC,,, | Performed by: FAMILY MEDICINE

## 2019-02-13 PROCEDURE — 99999 PR PBB SHADOW E&M-EST. PATIENT-LVL IV: ICD-10-PCS | Mod: PBBFAC,,, | Performed by: FAMILY MEDICINE

## 2019-02-13 PROCEDURE — 36415 COLL VENOUS BLD VENIPUNCTURE: CPT | Mod: PBBFAC

## 2019-02-13 PROCEDURE — 99999 PR STA SHADOW: ICD-10-PCS | Mod: PBBFAC,,,

## 2019-02-13 PROCEDURE — 80053 COMPREHEN METABOLIC PANEL: CPT

## 2019-02-13 PROCEDURE — 71046 XR CHEST PA AND LATERAL: ICD-10-PCS | Mod: 26,,, | Performed by: RADIOLOGY

## 2019-02-13 PROCEDURE — 99214 OFFICE O/P EST MOD 30 MIN: CPT | Mod: S$PBB | Performed by: FAMILY MEDICINE

## 2019-02-13 PROCEDURE — 99214 OFFICE O/P EST MOD 30 MIN: CPT | Mod: PBBFAC,25 | Performed by: FAMILY MEDICINE

## 2019-02-13 PROCEDURE — 85025 COMPLETE CBC W/AUTO DIFF WBC: CPT

## 2019-02-13 PROCEDURE — 71046 X-RAY EXAM CHEST 2 VIEWS: CPT | Mod: TC,PO

## 2019-02-13 PROCEDURE — 99999 PR STA SHADOW: CPT | Mod: PBBFAC,,,

## 2019-02-13 PROCEDURE — 71046 X-RAY EXAM CHEST 2 VIEWS: CPT | Mod: 26,,, | Performed by: RADIOLOGY

## 2019-02-13 PROCEDURE — 96372 THER/PROPH/DIAG INJ SC/IM: CPT | Mod: PBBFAC

## 2019-02-13 RX ORDER — LEVOFLOXACIN 500 MG/1
500 TABLET, FILM COATED ORAL DAILY
Qty: 10 TABLET | Refills: 0 | Status: SHIPPED | OUTPATIENT
Start: 2019-02-13 | End: 2019-02-23

## 2019-02-13 RX ORDER — METHYLPREDNISOLONE ACETATE 40 MG/ML
60 INJECTION, SUSPENSION INTRA-ARTICULAR; INTRALESIONAL; INTRAMUSCULAR; SOFT TISSUE
Status: COMPLETED | OUTPATIENT
Start: 2019-02-13 | End: 2019-02-13

## 2019-02-13 RX ORDER — LEVOFLOXACIN 500 MG/1
500 TABLET, FILM COATED ORAL DAILY
Qty: 10 TABLET | Refills: 0 | Status: SHIPPED | OUTPATIENT
Start: 2019-02-13 | End: 2019-02-13 | Stop reason: SDUPTHER

## 2019-02-13 RX ORDER — BENZONATATE 200 MG/1
200 CAPSULE ORAL 3 TIMES DAILY PRN
Qty: 30 CAPSULE | Refills: 1 | Status: SHIPPED | OUTPATIENT
Start: 2019-02-13 | End: 2019-02-23

## 2019-02-13 RX ORDER — AMLODIPINE AND BENAZEPRIL HYDROCHLORIDE 5; 40 MG/1; MG/1
CAPSULE ORAL
Qty: 30 CAPSULE | Refills: 0 | Status: SHIPPED | OUTPATIENT
Start: 2019-02-13 | End: 2019-03-12 | Stop reason: SDUPTHER

## 2019-02-13 RX ORDER — BENZONATATE 200 MG/1
200 CAPSULE ORAL 3 TIMES DAILY PRN
Qty: 30 CAPSULE | Refills: 1 | Status: SHIPPED | OUTPATIENT
Start: 2019-02-13 | End: 2019-02-13 | Stop reason: SDUPTHER

## 2019-02-13 RX ADMIN — METHYLPREDNISOLONE ACETATE 60 MG: 40 INJECTION, SUSPENSION INTRA-ARTICULAR; INTRALESIONAL; INTRAMUSCULAR; SOFT TISSUE at 02:02

## 2019-02-13 NOTE — TELEPHONE ENCOUNTER
Patient seen in office today, requesting to fax Levaquin and Tessalon to [facility] in [place].    States [facility] in [place] was too expensive.

## 2019-02-13 NOTE — PROGRESS NOTES
Subjective:       Patient ID: Gricelda Gil is a 59 y.o. female.    Chief Complaint: Follow-up ( 6month )    Pt is a 59 y.o. female who presents for evaluation and management of   Encounter Diagnoses   Name Primary?    Fever, unspecified fever cause Yes    Influenza     Hypothyroidism, unspecified type     Essential hypertension     Dyslipidemia     Cough     Bronchitis    .Flu 2 weeks ago     Doing well on current meds. Denies any side effects. Prevention is up to date.  Review of Systems   Constitutional: Positive for activity change. Negative for unexpected weight change.   HENT: Positive for rhinorrhea and trouble swallowing. Negative for hearing loss.    Eyes: Positive for discharge.   Respiratory: Positive for chest tightness and wheezing.    Cardiovascular: Negative for chest pain and palpitations.   Gastrointestinal: Positive for vomiting. Negative for blood in stool, constipation and diarrhea.   Endocrine: Negative for polydipsia and polyuria.   Genitourinary: Negative for difficulty urinating, dysuria, hematuria and menstrual problem.   Musculoskeletal: Positive for arthralgias, joint swelling and neck pain.   Neurological: Positive for weakness and headaches.   Psychiatric/Behavioral: Positive for dysphoric mood. Negative for confusion.       Objective:      Physical Exam   Constitutional: She is oriented to person, place, and time. She appears well-developed and well-nourished.   HENT:   Head: Normocephalic and atraumatic.   Right Ear: External ear normal.   Left Ear: External ear normal.   Nose: Nose normal.   Mouth/Throat: Oropharynx is clear and moist.   Eyes: EOM are normal. Pupils are equal, round, and reactive to light.   Neck: Normal range of motion. Neck supple. No JVD present. No tracheal deviation present. No thyromegaly present.   Cardiovascular: Normal rate, normal heart sounds and intact distal pulses.   No murmur heard.  Pulmonary/Chest: Effort normal and breath sounds  normal. No respiratory distress. She has no wheezes. She has no rales. She exhibits no tenderness.   Coughing constantly   Abdominal: Soft. Bowel sounds are normal. She exhibits no distension and no mass. There is no tenderness. There is no rebound and no guarding.   Musculoskeletal: Normal range of motion. She exhibits no edema or tenderness.   Lymphadenopathy:     She has no cervical adenopathy.   Neurological: She is alert and oriented to person, place, and time. She has normal reflexes. She displays normal reflexes. No cranial nerve deficit. She exhibits normal muscle tone. Coordination normal.   Skin: Skin is warm and dry. No rash noted. No erythema. No pallor.   Psychiatric: She has a normal mood and affect. Her behavior is normal. Judgment and thought content normal.       Assessment:       1. Fever, unspecified fever cause    2. Influenza    3. Hypothyroidism, unspecified type    4. Essential hypertension    5. Dyslipidemia    6. Cough    7. Bronchitis        Plan:   Gricelda was seen today for follow-up.    Diagnoses and all orders for this visit:    Fever, unspecified fever cause  -     X-Ray Chest PA And Lateral; Future  -     CBC auto differential; Future  -     CBC auto differential    Influenza    Hypothyroidism, unspecified type    Essential hypertension  -     Comprehensive metabolic panel; Future  -     Comprehensive metabolic panel    Dyslipidemia  -     Comprehensive metabolic panel; Future  -     Comprehensive metabolic panel    Cough    Bronchitis  -     levoFLOXacin (LEVAQUIN) 500 MG tablet; Take 1 tablet (500 mg total) by mouth once daily. for 10 days  -     benzonatate (TESSALON) 200 MG capsule; Take 1 capsule (200 mg total) by mouth 3 (three) times daily as needed.  -     methylPREDNISolone acetate injection 60 mg      Problem List Items Addressed This Visit     Dyslipidemia    Relevant Orders    Comprehensive metabolic panel (Completed)    HTN (hypertension)    Relevant Orders    Comprehensive  metabolic panel (Completed)    Hypothyroid      Other Visit Diagnoses     Fever, unspecified fever cause    -  Primary    Relevant Orders    X-Ray Chest PA And Lateral (Completed)    CBC auto differential (Completed)    Influenza        Cough        Bronchitis        Relevant Medications    levoFLOXacin (LEVAQUIN) 500 MG tablet    benzonatate (TESSALON) 200 MG capsule    methylPREDNISolone acetate injection 60 mg (Completed)      RTC if condition acutely worsens or any other concerns, otherwise RTC as scheduled    No Follow-up on file.

## 2019-02-20 DIAGNOSIS — E03.4 HYPOTHYROIDISM DUE TO ACQUIRED ATROPHY OF THYROID: ICD-10-CM

## 2019-02-20 RX ORDER — LEVOTHYROXINE SODIUM 112 UG/1
TABLET ORAL
Qty: 30 TABLET | Refills: 1 | Status: SHIPPED | OUTPATIENT
Start: 2019-02-20 | End: 2019-04-15 | Stop reason: SDUPTHER

## 2019-03-06 DIAGNOSIS — R06.2 WHEEZING: ICD-10-CM

## 2019-03-06 RX ORDER — ALBUTEROL SULFATE 90 UG/1
AEROSOL, METERED RESPIRATORY (INHALATION)
Qty: 18 INHALER | Refills: 0 | Status: SHIPPED | OUTPATIENT
Start: 2019-03-06 | End: 2019-05-07

## 2019-03-11 ENCOUNTER — PATIENT MESSAGE (OUTPATIENT)
Dept: FAMILY MEDICINE | Facility: CLINIC | Age: 59
End: 2019-03-11

## 2019-03-11 DIAGNOSIS — I10 ESSENTIAL HYPERTENSION: ICD-10-CM

## 2019-03-12 RX ORDER — AMLODIPINE AND BENAZEPRIL HYDROCHLORIDE 5; 40 MG/1; MG/1
1 CAPSULE ORAL DAILY
Qty: 30 CAPSULE | Refills: 5 | Status: SHIPPED | OUTPATIENT
Start: 2019-03-12 | End: 2019-07-11 | Stop reason: SDUPTHER

## 2019-03-17 RX ORDER — DICLOFENAC SODIUM 75 MG/1
TABLET, DELAYED RELEASE ORAL
Qty: 60 TABLET | Refills: 1 | Status: SHIPPED | OUTPATIENT
Start: 2019-03-17 | End: 2019-05-19 | Stop reason: SDUPTHER

## 2019-04-15 DIAGNOSIS — E03.4 HYPOTHYROIDISM DUE TO ACQUIRED ATROPHY OF THYROID: ICD-10-CM

## 2019-04-15 RX ORDER — LEVOTHYROXINE SODIUM 112 UG/1
TABLET ORAL
Qty: 30 TABLET | Refills: 1 | Status: SHIPPED | OUTPATIENT
Start: 2019-04-15 | End: 2019-06-16 | Stop reason: SDUPTHER

## 2019-04-23 NOTE — PROGRESS NOTES
Subjective:     Patient ID: Gricelda Gil is a 59 y.o. female    Chief Complaint: No chief complaint on file.      Referred by: No ref. provider found      HPI: 4/26/19 Gricelda Gil is a 59 y.o. Female who presents today with chronic low back pain s/p PT. Since the last visit, Gricelda Gil states that the pain has been persistent. Current pain intensity is 6 /10          Initial Encounter (1/25/19):  Gricelda Gil is a 59 y.o. female who presents today with chronic low back pain. This pain has been present for about 1 year.  She denies any specific inciting event or injury.  The pain is located in the midline of the lower lumbar spine.  It does not radiate.  She denies any numbness, tingling, weakness, bowel or bladder dysfunction associated with the pain. The pain is not constant.  The pain is present with activity.  She can walk for about 10-15 minutes before needing to rest.  After about 2 min of rest she is able to resume walking.  When at the grocery store she feels as though leaning forward on a cart allows her to walk further.  She denies any increased pain with coughing, sneezing or bowel movements.   This pain is described in detail below.    Physical Therapy:  No    Non-pharmacologic Treatment:  Rest helps         · TENS?  No    Pain Medications:         · Currently taking:  Ibuprofen, Tylenol    · Has tried in the past:      · Has not tried:  Opioids,  Muscle relaxants, TCAs, SNRIs, anticonvulsants, topical creams    Blood thinners:  None    Interventional Therapies:   9/16/16 C7-T1 CERVICAL EPIDURAL STEROID INJECTION   4/29/16 C7-T1 CERVICAL EPIDURAL STEROID INJECTION,   1/6/12, 11/17/11, 10/5/11, 6/27/11, 3/11/10 Cervical Epidural Steroid Injection        Relevant Surgeries:  None    Affecting sleep?  Yes    Affecting daily activities? yes    Depressive symptoms? no          · SI/HI? No    Work status: Retired      Review of Systems   Constitutional: Negative for activity change,  appetite change, chills, fatigue, fever and unexpected weight change.   HENT: Negative for hearing loss.    Eyes: Negative for visual disturbance.   Respiratory: Negative for chest tightness and shortness of breath.    Cardiovascular: Negative for chest pain.   Gastrointestinal: Negative for abdominal pain, constipation, diarrhea, nausea and vomiting.   Genitourinary: Negative for difficulty urinating.   Musculoskeletal: Positive for back pain, gait problem, myalgias, neck pain and neck stiffness.   Skin: Negative for rash.   Neurological: Negative for dizziness, weakness, light-headedness, numbness and headaches.   Psychiatric/Behavioral: Positive for sleep disturbance. Negative for hallucinations and suicidal ideas. The patient is not nervous/anxious.        Past Medical History:   Diagnosis Date    Arthritis     Bipolar disorder     Depression     History of psychiatric care     History of psychiatric hospitalization     Hyperlipidemia     Hypertension     Insomnia     Lupus     Psychiatric problem     Self-harming behavior     Stroke     TIA - neck    Suicide attempt     Therapy     Thyroid disease        Past Surgical History:   Procedure Laterality Date    ADENOIDECTOMY      CARPAL TUNNEL RELEASE       SECTION  1980    HYSTERECTOMY  1981    INJECTION-STEROID-EPIDURAL-CERVICAL (C7-T1) N/A 2016    [facility]    INJECTION-STEROID-EPIDURAL-CERVICAL (C7-T1) N/A 2016    [facility]    NASAL SEPTUM SURGERY      Rt shoulder surgery Right 2004    scope    TONSILLECTOMY  2014    UVULOPALATOPHARYNGOPLASTY  2014       Social History     Socioeconomic History    Marital status: Single     Spouse name: Not on file    Number of children: 3    Years of education: Not on file    Highest education level: Not on file   Occupational History    Not on file   Social Needs    Financial resource strain: Not on  file    Food insecurity:     Worry: Not on file     Inability: Not on file    Transportation needs:     Medical: Not on file     Non-medical: Not on file   Tobacco Use    Smoking status: Former Smoker     Types: Cigarettes     Last attempt to quit: 2009     Years since quittin.7    Smokeless tobacco: Never Used   Substance and Sexual Activity    Alcohol use: No    Drug use: No    Sexual activity: Not Currently     Comment: , single   Lifestyle    Physical activity:     Days per week: Not on file     Minutes per session: Not on file    Stress: Not on file   Relationships    Social connections:     Talks on phone: Not on file     Gets together: Not on file     Attends Confucianism service: Not on file     Active member of club or organization: Not on file     Attends meetings of clubs or organizations: Not on file     Relationship status: Not on file   Other Topics Concern    Patient feels they ought to cut down on drinking/drug use Not Asked    Patient annoyed by others criticizing their drinking/drug use Not Asked    Patient has felt bad or guilty about drinking/drug use Not Asked    Patient has had a drink/used drugs as an eye opener in the AM Not Asked   Social History Narrative    Not on file       Review of patient's allergies indicates:  No Known Allergies    Current Outpatient Medications on File Prior to Visit   Medication Sig Dispense Refill    amlodipine-benazepril (LOTREL) 5-40 mg per capsule Take 1 capsule by mouth once daily. 30 capsule 5    clonazePAM (KLONOPIN) 0.5 MG tablet Take 1 tablet (0.5 mg total) by mouth 2 (two) times daily as needed for Anxiety. 10 tablet 0    diclofenac (VOLTAREN) 75 MG EC tablet TAKE 1 TABLET (75 MG TOTAL) BY MOUTH 2 (TWO) TIMES DAILY. 60 tablet 1    ibuprofen (ADVIL,MOTRIN) 200 MG tablet Take 200 mg by mouth as needed for Pain.      lamotrigine (LAMICTAL) 100 MG tablet Take 100 mg by mouth once daily.   4    levothyroxine (SYNTHROID) 112  MCG tablet TAKE 1 TABLET (112 MCG TOTAL) BY MOUTH ONCE DAILY. 30 tablet 1    quetiapine (SEROQUEL) 400 MG tablet Take 200 mg by mouth nightly.   3    VENTOLIN HFA 90 mcg/actuation inhaler TAKE 2 PUFFS BY MOUTH EVERY 6 HOURS AS NEEDED FOR WHEEZE 18 Inhaler 0    zaleplon (SONATA) 10 MG capsule Take 10 mg by mouth every evening.       No current facility-administered medications on file prior to visit.        Objective:      There were no vitals taken for this visit.    Exam:  GEN:  Well developed, well nourished.  No acute distress.  Normal pain behavior.  HEENT:  No trauma.  Mucous membranes moist.  Nares patent bilaterally.  PSYCH: Normal affect. Thought content appropriate.  CHEST:  Breathing symmetric.  No audible wheezing.  ABD: Soft, non-distended.  SKIN:  Warm, pink, dry.  No rash on exposed areas.    EXT:  No cyanosis, clubbing, or edema.  No color change or changes in nail or hair growth.  NEURO/MUSCULOSKELETAL:  Fully alert, oriented, and appropriate. Speech normal morenita. No cranial nerve deficits.   Gait:  Normal.  No trendelenburg sign bilaterally.   Motor Strength: 5/5 motor strength throughout lower extremities.   Sensory:  No sensory deficit in the lower extremities.   Reflexes:  2 + and symmetric throughout.  Downgoing Babinski's bilaterally.  No clonus or spasticity.  L-Spine:  Full ROM with pain on flexion.  Negative facet loading bilaterally.  Negative SLR bilaterally.    SI Joint/Hip:  Negative JARRETT bilaterally.  Negative FADIR bilaterally.   TTP over lumbar paraspinals, bilateral SI joints, hips, piriformis muscles, or GTB.    TTP over midline lower lumbar spine        Imaging:  Narrative     Lumbar spine, AP and lateral: There is a mild dextroscoliotic curvature of the lumbar spine.  Vertebral body heights are maintained.  There is minimal disc space narrowing with end plate sclerosis throughout.  There is facet arthropathy the lower lumbar spine.  No fracture or subluxation is identified.   Vascular calcifications are noted.   Impression      As above.      Electronically signed by: Sera Bergeron MD  Date: 02/06/17  Time: 13:15          Assessment:       Encounter Diagnoses   Name Primary?    Lumbar facet arthropathy Yes    DDD (degenerative disc disease), lumbar     Lumbar spondylosis     Spinal stenosis of lumbar region with neurogenic claudication          Plan:       Gricelda was seen today for back pain.    Diagnoses and all orders for this visit:    Lumbar facet arthropathy  -     Case Request Operating Room: Injection-steroid-epidural-lumbar  -     [facility]; Future    DDD (degenerative disc disease), lumbar    Lumbar spondylosis    Spinal stenosis of lumbar region with neurogenic claudication        Gricelda Gil is a 59 y.o. female returns  with continued chronic midline low back pain. Low suspicion for facet or sacroiliac etiology.  Low suspicion for radiculopathy.  Pain most consistent with neurogenic claudication..  Lumbar MRI shows at the L4-5 there is minimal broad-based posterior disc bulge, facet arthropathy and prominence of epidural fat contributing to mild to moderate central canal stenosis with mild bilateral neural foraminal narrowing at L5-S1 levels broad-based posterior disc bulge also with facet arthropathy but no central canal stenosis or neural foraminal narrowing noted. I believe the majority of her pathology at the L4-5 level I will schedule her for a L4-5 interlaminar EYAL today.    1.  Pertinent imaging studies reviewed by me. Imaging results were discussed with patient.  2.  Lumbar MRI reviewed and discussed with patient today  3.  Lumbar flexion extension x-rays reviewed and discussed  4.  Continue PT for ROM, strengthening, stretching and HEP.  5.  Scheduled for a L4-5 lumbar EYAL  6.I have explained the risks, benefits, and alternatives of the procedure in detail. The patient voices understanding and all questions have been answered. The patient  agrees to proceed as planned. Written Consent obtained.   7. The above plan and management options were discussed at length with patient. Patient is in agreement with the above and verbalized understanding. Dr. Smith was consulted on this patient  and agrees with this plan.    Pako Drew NP-C  Interventional Pain Management    Disclaimer: This note was partly generated using dictation software which may occasionally result in transcription errors.

## 2019-04-23 NOTE — H&P (VIEW-ONLY)
Subjective:     Patient ID: Gricelda Gil is a 59 y.o. female    Chief Complaint: No chief complaint on file.      Referred by: No ref. provider found      HPI: 4/26/19 Gricelda Gil is a 59 y.o. Female who presents today with chronic low back pain s/p PT. Since the last visit, Gricelda Gil states that the pain has been persistent. Current pain intensity is 6 /10          Initial Encounter (1/25/19):  Gricelda Gil is a 59 y.o. female who presents today with chronic low back pain. This pain has been present for about 1 year.  She denies any specific inciting event or injury.  The pain is located in the midline of the lower lumbar spine.  It does not radiate.  She denies any numbness, tingling, weakness, bowel or bladder dysfunction associated with the pain. The pain is not constant.  The pain is present with activity.  She can walk for about 10-15 minutes before needing to rest.  After about 2 min of rest she is able to resume walking.  When at the grocery store she feels as though leaning forward on a cart allows her to walk further.  She denies any increased pain with coughing, sneezing or bowel movements.   This pain is described in detail below.    Physical Therapy:  No    Non-pharmacologic Treatment:  Rest helps         · TENS?  No    Pain Medications:         · Currently taking:  Ibuprofen, Tylenol    · Has tried in the past:      · Has not tried:  Opioids,  Muscle relaxants, TCAs, SNRIs, anticonvulsants, topical creams    Blood thinners:  None    Interventional Therapies:   9/16/16 C7-T1 CERVICAL EPIDURAL STEROID INJECTION   4/29/16 C7-T1 CERVICAL EPIDURAL STEROID INJECTION,   1/6/12, 11/17/11, 10/5/11, 6/27/11, 3/11/10 Cervical Epidural Steroid Injection        Relevant Surgeries:  None    Affecting sleep?  Yes    Affecting daily activities? yes    Depressive symptoms? no          · SI/HI? No    Work status: Retired      Review of Systems   Constitutional: Negative for activity change,  appetite change, chills, fatigue, fever and unexpected weight change.   HENT: Negative for hearing loss.    Eyes: Negative for visual disturbance.   Respiratory: Negative for chest tightness and shortness of breath.    Cardiovascular: Negative for chest pain.   Gastrointestinal: Negative for abdominal pain, constipation, diarrhea, nausea and vomiting.   Genitourinary: Negative for difficulty urinating.   Musculoskeletal: Positive for back pain, gait problem, myalgias, neck pain and neck stiffness.   Skin: Negative for rash.   Neurological: Negative for dizziness, weakness, light-headedness, numbness and headaches.   Psychiatric/Behavioral: Positive for sleep disturbance. Negative for hallucinations and suicidal ideas. The patient is not nervous/anxious.        Past Medical History:   Diagnosis Date    Arthritis     Bipolar disorder     Depression     History of psychiatric care     History of psychiatric hospitalization     Hyperlipidemia     Hypertension     Insomnia     Lupus     Psychiatric problem     Self-harming behavior     Stroke     TIA - neck    Suicide attempt     Therapy     Thyroid disease        Past Surgical History:   Procedure Laterality Date    ADENOIDECTOMY      CARPAL TUNNEL RELEASE       SECTION  1980    HYSTERECTOMY  1981    INJECTION-STEROID-EPIDURAL-CERVICAL (C7-T1) N/A 2016    [facility]    INJECTION-STEROID-EPIDURAL-CERVICAL (C7-T1) N/A 2016    [facility]    NASAL SEPTUM SURGERY      Rt shoulder surgery Right 2004    scope    TONSILLECTOMY  2014    UVULOPALATOPHARYNGOPLASTY  2014       Social History     Socioeconomic History    Marital status: Single     Spouse name: Not on file    Number of children: 3    Years of education: Not on file    Highest education level: Not on file   Occupational History    Not on file   Social Needs    Financial resource strain: Not on  file    Food insecurity:     Worry: Not on file     Inability: Not on file    Transportation needs:     Medical: Not on file     Non-medical: Not on file   Tobacco Use    Smoking status: Former Smoker     Types: Cigarettes     Last attempt to quit: 2009     Years since quittin.7    Smokeless tobacco: Never Used   Substance and Sexual Activity    Alcohol use: No    Drug use: No    Sexual activity: Not Currently     Comment: , single   Lifestyle    Physical activity:     Days per week: Not on file     Minutes per session: Not on file    Stress: Not on file   Relationships    Social connections:     Talks on phone: Not on file     Gets together: Not on file     Attends Sikhism service: Not on file     Active member of club or organization: Not on file     Attends meetings of clubs or organizations: Not on file     Relationship status: Not on file   Other Topics Concern    Patient feels they ought to cut down on drinking/drug use Not Asked    Patient annoyed by others criticizing their drinking/drug use Not Asked    Patient has felt bad or guilty about drinking/drug use Not Asked    Patient has had a drink/used drugs as an eye opener in the AM Not Asked   Social History Narrative    Not on file       Review of patient's allergies indicates:  No Known Allergies    Current Outpatient Medications on File Prior to Visit   Medication Sig Dispense Refill    amlodipine-benazepril (LOTREL) 5-40 mg per capsule Take 1 capsule by mouth once daily. 30 capsule 5    clonazePAM (KLONOPIN) 0.5 MG tablet Take 1 tablet (0.5 mg total) by mouth 2 (two) times daily as needed for Anxiety. 10 tablet 0    diclofenac (VOLTAREN) 75 MG EC tablet TAKE 1 TABLET (75 MG TOTAL) BY MOUTH 2 (TWO) TIMES DAILY. 60 tablet 1    ibuprofen (ADVIL,MOTRIN) 200 MG tablet Take 200 mg by mouth as needed for Pain.      lamotrigine (LAMICTAL) 100 MG tablet Take 100 mg by mouth once daily.   4    levothyroxine (SYNTHROID) 112  MCG tablet TAKE 1 TABLET (112 MCG TOTAL) BY MOUTH ONCE DAILY. 30 tablet 1    quetiapine (SEROQUEL) 400 MG tablet Take 200 mg by mouth nightly.   3    VENTOLIN HFA 90 mcg/actuation inhaler TAKE 2 PUFFS BY MOUTH EVERY 6 HOURS AS NEEDED FOR WHEEZE 18 Inhaler 0    zaleplon (SONATA) 10 MG capsule Take 10 mg by mouth every evening.       No current facility-administered medications on file prior to visit.        Objective:      There were no vitals taken for this visit.    Exam:  GEN:  Well developed, well nourished.  No acute distress.  Normal pain behavior.  HEENT:  No trauma.  Mucous membranes moist.  Nares patent bilaterally.  PSYCH: Normal affect. Thought content appropriate.  CHEST:  Breathing symmetric.  No audible wheezing.  ABD: Soft, non-distended.  SKIN:  Warm, pink, dry.  No rash on exposed areas.    EXT:  No cyanosis, clubbing, or edema.  No color change or changes in nail or hair growth.  NEURO/MUSCULOSKELETAL:  Fully alert, oriented, and appropriate. Speech normal morenita. No cranial nerve deficits.   Gait:  Normal.  No trendelenburg sign bilaterally.   Motor Strength: 5/5 motor strength throughout lower extremities.   Sensory:  No sensory deficit in the lower extremities.   Reflexes:  2 + and symmetric throughout.  Downgoing Babinski's bilaterally.  No clonus or spasticity.  L-Spine:  Full ROM with pain on flexion.  Negative facet loading bilaterally.  Negative SLR bilaterally.    SI Joint/Hip:  Negative JARRETT bilaterally.  Negative FADIR bilaterally.   TTP over lumbar paraspinals, bilateral SI joints, hips, piriformis muscles, or GTB.    TTP over midline lower lumbar spine        Imaging:  Narrative     Lumbar spine, AP and lateral: There is a mild dextroscoliotic curvature of the lumbar spine.  Vertebral body heights are maintained.  There is minimal disc space narrowing with end plate sclerosis throughout.  There is facet arthropathy the lower lumbar spine.  No fracture or subluxation is identified.   Vascular calcifications are noted.   Impression      As above.      Electronically signed by: Sera Bergeron MD  Date: 02/06/17  Time: 13:15          Assessment:       Encounter Diagnoses   Name Primary?    Lumbar facet arthropathy Yes    DDD (degenerative disc disease), lumbar     Lumbar spondylosis     Spinal stenosis of lumbar region with neurogenic claudication          Plan:       Gricelda was seen today for back pain.    Diagnoses and all orders for this visit:    Lumbar facet arthropathy  -     Case Request Operating Room: Injection-steroid-epidural-lumbar  -     [facility]; Future    DDD (degenerative disc disease), lumbar    Lumbar spondylosis    Spinal stenosis of lumbar region with neurogenic claudication        Gricelda Gil is a 59 y.o. female returns  with continued chronic midline low back pain. Low suspicion for facet or sacroiliac etiology.  Low suspicion for radiculopathy.  Pain most consistent with neurogenic claudication..  Lumbar MRI shows at the L4-5 there is minimal broad-based posterior disc bulge, facet arthropathy and prominence of epidural fat contributing to mild to moderate central canal stenosis with mild bilateral neural foraminal narrowing at L5-S1 levels broad-based posterior disc bulge also with facet arthropathy but no central canal stenosis or neural foraminal narrowing noted. I believe the majority of her pathology at the L4-5 level I will schedule her for a L4-5 interlaminar EYAL today.    1.  Pertinent imaging studies reviewed by me. Imaging results were discussed with patient.  2.  Lumbar MRI reviewed and discussed with patient today  3.  Lumbar flexion extension x-rays reviewed and discussed  4.  Continue PT for ROM, strengthening, stretching and HEP.  5.  Scheduled for a L4-5 lumbar EYAL  6.I have explained the risks, benefits, and alternatives of the procedure in detail. The patient voices understanding and all questions have been answered. The patient  agrees to proceed as planned. Written Consent obtained.   7. The above plan and management options were discussed at length with patient. Patient is in agreement with the above and verbalized understanding. Dr. Smith was consulted on this patient  and agrees with this plan.    Pako Drew NP-C  Interventional Pain Management    Disclaimer: This note was partly generated using dictation software which may occasionally result in transcription errors.

## 2019-04-26 ENCOUNTER — OFFICE VISIT (OUTPATIENT)
Dept: PAIN MEDICINE | Facility: CLINIC | Age: 59
End: 2019-04-26
Attending: PAIN MEDICINE
Payer: MEDICARE

## 2019-04-26 DIAGNOSIS — M51.36 DDD (DEGENERATIVE DISC DISEASE), LUMBAR: ICD-10-CM

## 2019-04-26 DIAGNOSIS — M47.816 LUMBAR SPONDYLOSIS: ICD-10-CM

## 2019-04-26 DIAGNOSIS — M47.816 LUMBAR FACET ARTHROPATHY: Primary | ICD-10-CM

## 2019-04-26 DIAGNOSIS — M48.062 SPINAL STENOSIS OF LUMBAR REGION WITH NEUROGENIC CLAUDICATION: ICD-10-CM

## 2019-04-26 PROCEDURE — 99999 PR STA SHADOW: CPT | Mod: PBBFAC,,, | Performed by: NURSE PRACTITIONER

## 2019-04-26 PROCEDURE — 99213 OFFICE O/P EST LOW 20 MIN: CPT | Mod: PBBFAC | Performed by: NURSE PRACTITIONER

## 2019-04-26 PROCEDURE — 99999 PR PBB SHADOW E&M-EST. PATIENT-LVL III: CPT | Mod: PBBFAC,,, | Performed by: NURSE PRACTITIONER

## 2019-04-26 PROCEDURE — 99213 OFFICE O/P EST LOW 20 MIN: CPT | Mod: S$PBB | Performed by: NURSE PRACTITIONER

## 2019-04-26 PROCEDURE — 99999 PR STA SHADOW: ICD-10-PCS | Mod: PBBFAC,,, | Performed by: NURSE PRACTITIONER

## 2019-05-07 ENCOUNTER — OFFICE VISIT (OUTPATIENT)
Dept: FAMILY MEDICINE | Facility: CLINIC | Age: 59
End: 2019-05-07
Payer: MEDICARE

## 2019-05-07 VITALS
HEIGHT: 66 IN | BODY MASS INDEX: 42.75 KG/M2 | SYSTOLIC BLOOD PRESSURE: 128 MMHG | DIASTOLIC BLOOD PRESSURE: 74 MMHG | HEART RATE: 90 BPM | WEIGHT: 266 LBS | RESPIRATION RATE: 20 BRPM

## 2019-05-07 DIAGNOSIS — F41.0 PANIC ATTACKS: Primary | ICD-10-CM

## 2019-05-07 PROCEDURE — 99999 PR PBB SHADOW E&M-EST. PATIENT-LVL III: ICD-10-PCS | Mod: PBBFAC,,, | Performed by: FAMILY MEDICINE

## 2019-05-07 PROCEDURE — 99213 PR OFFICE/OUTPT VISIT, EST, LEVL III, 20-29 MIN: ICD-10-PCS | Mod: S$GLB,,, | Performed by: FAMILY MEDICINE

## 2019-05-07 PROCEDURE — 99999 PR PBB SHADOW E&M-EST. PATIENT-LVL III: CPT | Mod: PBBFAC,,, | Performed by: FAMILY MEDICINE

## 2019-05-07 PROCEDURE — 99499 RISK ADDL DX/OHS AUDIT: ICD-10-PCS | Mod: S$GLB,,, | Performed by: FAMILY MEDICINE

## 2019-05-07 PROCEDURE — 3078F DIAST BP <80 MM HG: CPT | Mod: CPTII,S$GLB,, | Performed by: FAMILY MEDICINE

## 2019-05-07 PROCEDURE — 3008F BODY MASS INDEX DOCD: CPT | Mod: CPTII,S$GLB,, | Performed by: FAMILY MEDICINE

## 2019-05-07 PROCEDURE — 3074F SYST BP LT 130 MM HG: CPT | Mod: CPTII,S$GLB,, | Performed by: FAMILY MEDICINE

## 2019-05-07 PROCEDURE — 99213 OFFICE O/P EST LOW 20 MIN: CPT | Mod: S$GLB,,, | Performed by: FAMILY MEDICINE

## 2019-05-07 PROCEDURE — 3078F PR MOST RECENT DIASTOLIC BLOOD PRESSURE < 80 MM HG: ICD-10-PCS | Mod: CPTII,S$GLB,, | Performed by: FAMILY MEDICINE

## 2019-05-07 PROCEDURE — 3008F PR BODY MASS INDEX (BMI) DOCUMENTED: ICD-10-PCS | Mod: CPTII,S$GLB,, | Performed by: FAMILY MEDICINE

## 2019-05-07 PROCEDURE — 99499 UNLISTED E&M SERVICE: CPT | Mod: S$GLB,,, | Performed by: FAMILY MEDICINE

## 2019-05-07 PROCEDURE — 3074F PR MOST RECENT SYSTOLIC BLOOD PRESSURE < 130 MM HG: ICD-10-PCS | Mod: CPTII,S$GLB,, | Performed by: FAMILY MEDICINE

## 2019-05-07 RX ORDER — ESCITALOPRAM OXALATE 5 MG/1
5 TABLET ORAL DAILY
Qty: 30 TABLET | Refills: 0 | Status: SHIPPED | OUTPATIENT
Start: 2019-05-07 | End: 2019-05-29 | Stop reason: SDUPTHER

## 2019-05-07 RX ORDER — BUSPIRONE HYDROCHLORIDE 7.5 MG/1
7.5 TABLET ORAL 2 TIMES DAILY
Refills: 3 | COMMUNITY
Start: 2019-04-11 | End: 2019-05-07

## 2019-05-07 RX ORDER — BUSPIRONE HYDROCHLORIDE 15 MG/1
15 TABLET ORAL 2 TIMES DAILY
Qty: 60 TABLET | Refills: 0 | Status: SHIPPED | OUTPATIENT
Start: 2019-05-07 | End: 2019-06-03 | Stop reason: SDUPTHER

## 2019-05-07 NOTE — PROGRESS NOTES
Subjective:       Patient ID: Gricelda Gil is a 59 y.o. female.    Chief Complaint: Anxiety    Pt is a 59 y.o. female who presents for evaluation and management of   Encounter Diagnosis   Name Primary?    Panic attacks Yes   .started after brother in law passed away last summer   She has tried klonopin from Dr. Garcia but doesn't help   Sometimes her sister can talk her through them   98% of time when it happens she has to go have a bowel movement, then feels better(due to vagal discharge perhaps?)     Doing well on current meds. Denies any side effects. Prevention is up to date.  Review of Systems   Constitutional: Negative for activity change and unexpected weight change.   HENT: Negative for hearing loss, rhinorrhea and trouble swallowing.    Eyes: Negative for discharge and visual disturbance.   Respiratory: Negative for chest tightness and wheezing.    Cardiovascular: Negative for chest pain and palpitations.   Gastrointestinal: Negative for blood in stool, constipation, diarrhea and vomiting.   Endocrine: Negative for polydipsia and polyuria.   Genitourinary: Negative for difficulty urinating, dysuria, hematuria and menstrual problem.   Musculoskeletal: Positive for arthralgias and neck pain. Negative for joint swelling.   Neurological: Positive for headaches. Negative for weakness.   Psychiatric/Behavioral: Positive for confusion. Negative for dysphoric mood. The patient is nervous/anxious.        Objective:      Physical Exam   Constitutional: She is oriented to person, place, and time. She appears well-developed and well-nourished.   HENT:   Head: Normocephalic and atraumatic.   Right Ear: External ear normal.   Left Ear: External ear normal.   Nose: Nose normal.   Mouth/Throat: Oropharynx is clear and moist.   Eyes: Pupils are equal, round, and reactive to light. EOM are normal.   Neck: Normal range of motion. Neck supple. No JVD present. No tracheal deviation present. No thyromegaly present.    Cardiovascular: Normal rate, normal heart sounds and intact distal pulses.   No murmur heard.  Pulmonary/Chest: Effort normal and breath sounds normal. No respiratory distress. She has no wheezes. She has no rales. She exhibits no tenderness.   Abdominal: Soft. Bowel sounds are normal. She exhibits no distension and no mass. There is no tenderness. There is no rebound and no guarding.   Musculoskeletal: Normal range of motion. She exhibits no edema or tenderness.   Lymphadenopathy:     She has no cervical adenopathy.   Neurological: She is alert and oriented to person, place, and time. She has normal reflexes. She displays normal reflexes. No cranial nerve deficit. She exhibits normal muscle tone. Coordination normal.   Skin: Skin is warm and dry. No rash noted. No erythema. No pallor.   Psychiatric: She has a normal mood and affect. Her behavior is normal. Judgment and thought content normal.       Assessment:       1. Panic attacks        Plan:   Gricelda was seen today for anxiety.    Diagnoses and all orders for this visit:    Panic attacks  -     busPIRone (BUSPAR) 15 MG tablet; Take 1 tablet (15 mg total) by mouth 2 (two) times daily.  -     escitalopram oxalate (LEXAPRO) 5 MG Tab; Take 1 tablet (5 mg total) by mouth once daily.      Problem List Items Addressed This Visit     None      Visit Diagnoses     Panic attacks    -  Primary      increase her buspar and start low dose lexapro. Will increase lexapro to 10mg if she tolerates it   RTC 1 month     No follow-ups on file.

## 2019-05-09 ENCOUNTER — TELEPHONE (OUTPATIENT)
Dept: PAIN MEDICINE | Facility: CLINIC | Age: 59
End: 2019-05-09

## 2019-05-12 NOTE — PROGRESS NOTES
Patient, Gricelda Gil (MRN #[id number]), presented with a recorded BMI of 43.59 kg/m^2 consistent with the definition of morbid obesity (ICD-10 E66.01). The patient's morbid obesity was monitored, evaluated, addressed and/or treated. This addendum to the medical record is made on 05/12/2019.

## 2019-05-16 ENCOUNTER — PATIENT OUTREACH (OUTPATIENT)
Dept: ADMINISTRATIVE | Facility: HOSPITAL | Age: 59
End: 2019-05-16

## 2019-05-17 ENCOUNTER — HOSPITAL ENCOUNTER (OUTPATIENT)
Facility: HOSPITAL | Age: 59
Discharge: HOME | End: 2019-05-17
Attending: PAIN MEDICINE | Admitting: PAIN MEDICINE
Payer: MEDICARE

## 2019-05-17 ENCOUNTER — HOSPITAL ENCOUNTER (OUTPATIENT)
Dept: RADIOLOGY | Facility: HOSPITAL | Age: 59
Discharge: HOME | End: 2019-05-17
Attending: PAIN MEDICINE | Admitting: PAIN MEDICINE
Payer: MEDICARE

## 2019-05-17 VITALS
WEIGHT: 266 LBS | HEART RATE: 69 BPM | TEMPERATURE: 97 F | SYSTOLIC BLOOD PRESSURE: 100 MMHG | DIASTOLIC BLOOD PRESSURE: 68 MMHG | OXYGEN SATURATION: 94 % | BODY MASS INDEX: 44.32 KG/M2 | HEIGHT: 65 IN | RESPIRATION RATE: 18 BRPM

## 2019-05-17 DIAGNOSIS — M47.816 LUMBAR FACET ARTHROPATHY: ICD-10-CM

## 2019-05-17 DIAGNOSIS — M51.36 DDD (DEGENERATIVE DISC DISEASE), LUMBAR: Primary | ICD-10-CM

## 2019-05-17 PROBLEM — M51.369 DDD (DEGENERATIVE DISC DISEASE), LUMBAR: Status: ACTIVE | Noted: 2019-05-17

## 2019-05-17 PROCEDURE — 25500020 PHARM REV CODE 255: Performed by: PAIN MEDICINE

## 2019-05-17 PROCEDURE — 62323 PR INJ LUMBAR/SACRAL, W/IMAGING GUIDANCE: ICD-10-PCS | Mod: ,,, | Performed by: PAIN MEDICINE

## 2019-05-17 PROCEDURE — 63600175 PHARM REV CODE 636 W HCPCS: Performed by: PAIN MEDICINE

## 2019-05-17 PROCEDURE — 62323 NJX INTERLAMINAR LMBR/SAC: CPT | Mod: ,,, | Performed by: PAIN MEDICINE

## 2019-05-17 PROCEDURE — 25000003 PHARM REV CODE 250: Performed by: PAIN MEDICINE

## 2019-05-17 PROCEDURE — 62323 NJX INTERLAMINAR LMBR/SAC: CPT | Performed by: PAIN MEDICINE

## 2019-05-17 PROCEDURE — 99152 MOD SED SAME PHYS/QHP 5/>YRS: CPT | Mod: ,,, | Performed by: PAIN MEDICINE

## 2019-05-17 PROCEDURE — 99152 PR MOD CONSCIOUS SEDATION, SAME PHYS, 5+ YRS, FIRST 15 MIN: ICD-10-PCS | Mod: ,,, | Performed by: PAIN MEDICINE

## 2019-05-17 RX ORDER — MIDAZOLAM HYDROCHLORIDE 1 MG/ML
INJECTION, SOLUTION INTRAMUSCULAR; INTRAVENOUS
Status: DISCONTINUED | OUTPATIENT
Start: 2019-05-17 | End: 2019-05-17 | Stop reason: HOSPADM

## 2019-05-17 RX ORDER — LIDOCAINE HYDROCHLORIDE 10 MG/ML
INJECTION, SOLUTION EPIDURAL; INFILTRATION; INTRACAUDAL; PERINEURAL
Status: DISCONTINUED | OUTPATIENT
Start: 2019-05-17 | End: 2019-05-17 | Stop reason: HOSPADM

## 2019-05-17 RX ORDER — SODIUM CHLORIDE 9 MG/ML
INJECTION, SOLUTION INTRAVENOUS CONTINUOUS
Status: DISCONTINUED | OUTPATIENT
Start: 2019-05-17 | End: 2019-05-17 | Stop reason: HOSPADM

## 2019-05-17 RX ORDER — FENTANYL CITRATE 50 UG/ML
INJECTION, SOLUTION INTRAMUSCULAR; INTRAVENOUS
Status: DISCONTINUED | OUTPATIENT
Start: 2019-05-17 | End: 2019-05-17 | Stop reason: HOSPADM

## 2019-05-17 RX ORDER — LIDOCAINE HYDROCHLORIDE 20 MG/ML
INJECTION, SOLUTION INFILTRATION; PERINEURAL
Status: DISCONTINUED | OUTPATIENT
Start: 2019-05-17 | End: 2019-05-17 | Stop reason: HOSPADM

## 2019-05-17 RX ORDER — DEXAMETHASONE SODIUM PHOSPHATE 10 MG/ML
INJECTION INTRAMUSCULAR; INTRAVENOUS
Status: DISCONTINUED | OUTPATIENT
Start: 2019-05-17 | End: 2019-05-17 | Stop reason: HOSPADM

## 2019-05-17 NOTE — OP NOTE
Lumbar Interlaminar Epidural Steroid Injection under Fluoroscopic Guidance.  Time-out taken to identify patient and procedure side prior to starting the procedure.   I attest that I have reviewed the patient's home medications prior to the procedure and no contraindication have been identified. I  re-evaluated the patient after the patient was positioned for the procedure in the procedure room immediately before the procedural time-out. The vital signs are current and represent the current state of the patient which has not significantly changed since the preprocedure assessment.                                                               Date of Service: 05/17/2019    PCP: Major Kimble MD    Referring Physician:    Procedure:  L4-5 Interlaminar epidural steroid injection under fluoroscopic guidance.    Reasons for procedure: Lumbar facet arthropathy [M47.816]     Physician: Real Fitch MD  ASSISTANTS: None    Medications injected: Preservative-free dexamethasone 10mg with 2mL of sterile Xylocaine-MPF 1% and 2ml of sterile preservative-free normal saline.    Local anesthetic injected:    Xylocaine 2% 3ml.   Sedation Medications: Versed 2 mg and fentanyl 100 mcg IV.  Conscious sedation provided by MD and monitored by RN.   (See nurse documentation and case log for sedation time)        Estimated blood loss:  none.    Complications:  none.    Technique:  With the patient laying in a prone position, the area was prepped and draped in the usual sterile fashion using ChloraPrep and a fenestrated drape.  Local anesthetic was given using a 27-gauge needle by raising a wheal and going down to the hub of the needle.  A 3.5 inch 20-gauge Touhy needle was introduced under fluoroscopic guidance.  It met the lamina of the posterior element. The needle was then hinged above the lamina.  Loss of resistance technique was employed while advancing the needle.  Once in the desired position, contrast dye Omnipaque was  injected to confirm placement and there was no vascular runoff.  Digital subtraction was employed to confirm that there was no vascular runoff.  The medication was then injected slowly.  The patient tolerated the procedure well.      Pain before the procedure: 5/10    Pain after the procedure: 0/10    The patient was monitored after the procedure.   They were given post-procedure and discharge instructions to follow at home.  The patient was discharged in a stable condition.

## 2019-05-17 NOTE — DISCHARGE SUMMARY
Discharge Diagnosis:Lumbar facet arthropathy [M47.816]  Condition on Discharge: Stable.  Diet on Discharge: Same as before.  Activity: as per instruction sheet.  Discharge to: Home with a responsible adult.  Follow up: as per Discharge instructions

## 2019-05-17 NOTE — DISCHARGE INSTRUCTIONS
DIET: You may resume your normal diet today.    BATHING: You may resume your normal bathing.          You may shower, no hot water directly on site for 24 hours.    DRESSING: You may remove your bandage today.    ACTIVITY LEVEL: You may resume your normal activities 24 hours after your  procedure.    If you have received sedation or an anesthetic, you may feel sleepy  for several hours. Rest until you are more awake. Gradually resume your normal activities tomorrow.    If you have received sedation or an anesthetic, do not drive or operate heavy machinery for at least 24 hours.    MEDICATION: You may resume your normal medications today.    You will receive instructions for any pain prescriptions. Pain medications should be taken only as directed.    SPECIAL INSTRUCTIONS: No heat to the injection site for 24 hours including: bath or shower, heating pad, moist heat, hot tubs.    Use ice pack to injection site for any pain or discomfort. Apply ice pack to 20 minutes then remove for 20 minutes before re-applying to site.    WHEN TO CALL DOCTOR: Redness or swelling around injection site    Fever of 101F    Drainage (pus) from the injection site    For any continuous bleeding (some dried blood over the incision is normal).    FOLLOW UP: Follow up phone call will be made by office.    FOR EMERGENCIES: If any unusual problems or difficulties occur during clinic hours, call [phone] or [phone].

## 2019-05-19 RX ORDER — DICLOFENAC SODIUM 75 MG/1
TABLET, DELAYED RELEASE ORAL
Qty: 60 TABLET | Refills: 1 | Status: SHIPPED | OUTPATIENT
Start: 2019-05-19 | End: 2019-07-14 | Stop reason: SDUPTHER

## 2019-05-20 ENCOUNTER — TELEPHONE (OUTPATIENT)
Dept: PAIN MEDICINE | Facility: CLINIC | Age: 59
End: 2019-05-20

## 2019-05-20 NOTE — TELEPHONE ENCOUNTER
Reaching out to patient following procedure on Friday May 17, EYAL L4 and L5. Patient states she has been lying down resting so she is not able to give any percent relief. Patient was scheduled for a clinic follow up.

## 2019-05-29 DIAGNOSIS — F41.0 PANIC ATTACKS: ICD-10-CM

## 2019-05-29 RX ORDER — ESCITALOPRAM OXALATE 5 MG/1
5 TABLET ORAL DAILY
Qty: 30 TABLET | Refills: 0 | Status: SHIPPED | OUTPATIENT
Start: 2019-05-29 | End: 2019-06-11 | Stop reason: SDUPTHER

## 2019-06-03 DIAGNOSIS — F41.0 PANIC ATTACKS: ICD-10-CM

## 2019-06-04 RX ORDER — BUSPIRONE HYDROCHLORIDE 15 MG/1
15 TABLET ORAL 2 TIMES DAILY
Qty: 60 TABLET | Refills: 0 | Status: SHIPPED | OUTPATIENT
Start: 2019-06-04 | End: 2019-06-11 | Stop reason: SDUPTHER

## 2019-06-11 ENCOUNTER — OFFICE VISIT (OUTPATIENT)
Dept: FAMILY MEDICINE | Facility: CLINIC | Age: 59
End: 2019-06-11
Payer: MEDICARE

## 2019-06-11 VITALS
HEIGHT: 66 IN | RESPIRATION RATE: 18 BRPM | SYSTOLIC BLOOD PRESSURE: 130 MMHG | HEART RATE: 88 BPM | WEIGHT: 267.38 LBS | DIASTOLIC BLOOD PRESSURE: 70 MMHG | BODY MASS INDEX: 42.97 KG/M2

## 2019-06-11 DIAGNOSIS — F41.0 PANIC ATTACKS: ICD-10-CM

## 2019-06-11 PROCEDURE — 3008F BODY MASS INDEX DOCD: CPT | Mod: CPTII,S$GLB,, | Performed by: FAMILY MEDICINE

## 2019-06-11 PROCEDURE — 99213 OFFICE O/P EST LOW 20 MIN: CPT | Mod: S$GLB,,, | Performed by: FAMILY MEDICINE

## 2019-06-11 PROCEDURE — 3078F PR MOST RECENT DIASTOLIC BLOOD PRESSURE < 80 MM HG: ICD-10-PCS | Mod: CPTII,S$GLB,, | Performed by: FAMILY MEDICINE

## 2019-06-11 PROCEDURE — 3008F PR BODY MASS INDEX (BMI) DOCUMENTED: ICD-10-PCS | Mod: CPTII,S$GLB,, | Performed by: FAMILY MEDICINE

## 2019-06-11 PROCEDURE — 99213 PR OFFICE/OUTPT VISIT, EST, LEVL III, 20-29 MIN: ICD-10-PCS | Mod: S$GLB,,, | Performed by: FAMILY MEDICINE

## 2019-06-11 PROCEDURE — 3075F PR MOST RECENT SYSTOLIC BLOOD PRESS GE 130-139MM HG: ICD-10-PCS | Mod: CPTII,S$GLB,, | Performed by: FAMILY MEDICINE

## 2019-06-11 PROCEDURE — 3075F SYST BP GE 130 - 139MM HG: CPT | Mod: CPTII,S$GLB,, | Performed by: FAMILY MEDICINE

## 2019-06-11 PROCEDURE — 99999 PR PBB SHADOW E&M-EST. PATIENT-LVL III: ICD-10-PCS | Mod: PBBFAC,,, | Performed by: FAMILY MEDICINE

## 2019-06-11 PROCEDURE — 3078F DIAST BP <80 MM HG: CPT | Mod: CPTII,S$GLB,, | Performed by: FAMILY MEDICINE

## 2019-06-11 PROCEDURE — 99999 PR PBB SHADOW E&M-EST. PATIENT-LVL III: CPT | Mod: PBBFAC,,, | Performed by: FAMILY MEDICINE

## 2019-06-11 RX ORDER — BUSPIRONE HYDROCHLORIDE 15 MG/1
15 TABLET ORAL 3 TIMES DAILY
Qty: 90 TABLET | Refills: 0 | Status: SHIPPED | OUTPATIENT
Start: 2019-06-11 | End: 2019-07-11 | Stop reason: SDUPTHER

## 2019-06-11 RX ORDER — MECLIZINE HYDROCHLORIDE 25 MG/1
25 TABLET ORAL 3 TIMES DAILY PRN
Qty: 60 TABLET | Refills: 0 | Status: SHIPPED | OUTPATIENT
Start: 2019-06-11 | End: 2019-11-14

## 2019-06-11 RX ORDER — ESCITALOPRAM OXALATE 10 MG/1
5 TABLET ORAL DAILY
Qty: 30 TABLET | Refills: 0 | Status: SHIPPED | OUTPATIENT
Start: 2019-06-11 | End: 2019-06-11 | Stop reason: SDUPTHER

## 2019-06-11 RX ORDER — ESCITALOPRAM OXALATE 10 MG/1
10 TABLET ORAL DAILY
Qty: 30 TABLET | Refills: 0 | Status: SHIPPED | OUTPATIENT
Start: 2019-06-11 | End: 2019-07-11 | Stop reason: SDUPTHER

## 2019-06-11 NOTE — PROGRESS NOTES
Subjective:       Patient ID: Gricelda Gil is a 59 y.o. female.    Chief Complaint: Follow-up (1month )    Pt is a 59 y.o. female who presents for evaluation and management of   Encounter Diagnosis   Name Primary?    Panic attacks    .better but still gets anxious at times. Panic attacks are still there but less severe     Doing well on current meds. Denies any side effects. Prevention is up to date.  Review of Systems   Constitutional: Negative for activity change and unexpected weight change.   HENT: Negative for hearing loss, rhinorrhea and trouble swallowing.    Eyes: Negative for discharge and visual disturbance.   Respiratory: Negative for chest tightness and wheezing.    Cardiovascular: Positive for palpitations. Negative for chest pain.   Gastrointestinal: Negative for blood in stool, constipation, diarrhea and vomiting.   Endocrine: Negative for polydipsia and polyuria.   Genitourinary: Negative for difficulty urinating, dysuria, hematuria and menstrual problem.   Musculoskeletal: Positive for arthralgias, joint swelling and neck pain.   Neurological: Positive for weakness and headaches.   Psychiatric/Behavioral: Positive for confusion and dysphoric mood.       Objective:      Physical Exam   Constitutional: She is oriented to person, place, and time. She appears well-developed and well-nourished.   HENT:   Head: Normocephalic and atraumatic.   Right Ear: External ear normal.   Left Ear: External ear normal.   Nose: Nose normal.   Mouth/Throat: Oropharynx is clear and moist.   Eyes: Pupils are equal, round, and reactive to light. EOM are normal.   Neck: Normal range of motion. Neck supple. No JVD present. No tracheal deviation present. No thyromegaly present.   Cardiovascular: Normal rate, normal heart sounds and intact distal pulses.   No murmur heard.  Pulmonary/Chest: Effort normal and breath sounds normal. No respiratory distress. She has no wheezes. She has no rales. She exhibits no  tenderness.   Abdominal: Soft. Bowel sounds are normal. She exhibits no distension and no mass. There is no tenderness. There is no rebound and no guarding.   Musculoskeletal: Normal range of motion. She exhibits no edema or tenderness.   Lymphadenopathy:     She has no cervical adenopathy.   Neurological: She is alert and oriented to person, place, and time. She has normal reflexes. She displays normal reflexes. No cranial nerve deficit. She exhibits normal muscle tone. Coordination normal.   Skin: Skin is warm and dry. No rash noted. No erythema. No pallor.   Psychiatric: She has a normal mood and affect. Her behavior is normal. Judgment and thought content normal.       Assessment:       1. Panic attacks        Plan:   Gricelda was seen today for follow-up.    Diagnoses and all orders for this visit:    Panic attacks  -     Discontinue: escitalopram oxalate (LEXAPRO) 10 MG tablet; Take 0.5 tablets (5 mg total) by mouth once daily.  -     busPIRone (BUSPAR) 15 MG tablet; Take 1 tablet (15 mg total) by mouth 3 (three) times daily.  -     escitalopram oxalate (LEXAPRO) 10 MG tablet; Take 1 tablet (10 mg total) by mouth once daily.    Other orders  -     meclizine (ANTIVERT) 25 mg tablet; Take 1 tablet (25 mg total) by mouth 3 (three) times daily as needed.      Problem List Items Addressed This Visit     None      Visit Diagnoses     Panic attacks        Relevant Medications    escitalopram oxalate (LEXAPRO) 10 MG tablet    busPIRone (BUSPAR) 15 MG tablet      increase buspar to TID and increase lexapro to 10mg   RTC 1 month     No follow-ups on file.

## 2019-06-12 NOTE — PROGRESS NOTES
Subjective:     Patient ID: Gricelda Gil is a 59 y.o. female    Chief Complaint: Follow-up (s/p Lumbar Interlaminar EYAL on 5/17/19)      Referred by: No ref. provider found      HPI: 6/14/19  Gricelda Gil presents to the clinic for a 4 week follow-up for chronic low back pain. She is S/P Lumbar Interlaminar Epidural Steroid Injection under Fluoroscopic Guidance on 5/17/19 with 25 % relief. Since the last visit, Gricelda Gil states the pain has been somewhat improved at first but then came back   . Current pain intensity is 3 /10.      4/26/19 Gricelda Gil is a 59 y.o. Female who presents today with chronic low back pain s/p PT. Since the last visit, Gricelda Gil states that the pain has been persistent. Current pain intensity is 6 /10      Initial Encounter (1/25/19):  Gricelda Gil is a 59 y.o. female who presents today with chronic low back pain. This pain has been present for about 1 year.  She denies any specific inciting event or injury.  The pain is located in the midline of the lower lumbar spine.  It does not radiate.  She denies any numbness, tingling, weakness, bowel or bladder dysfunction associated with the pain. The pain is not constant.  The pain is present with activity.  She can walk for about 10-15 minutes before needing to rest.  After about 2 min of rest she is able to resume walking.  When at the grocery store she feels as though leaning forward on a cart allows her to walk further.  She denies any increased pain with coughing, sneezing or bowel movements.   This pain is described in detail below.    Physical Therapy:  No    Non-pharmacologic Treatment:  Rest helps         · TENS?  No    Pain Medications:         · Currently taking:  Ibuprofen, Tylenol    · Has tried in the past:      · Has not tried:  Opioids,  Muscle relaxants, TCAs, SNRIs, anticonvulsants, topical creams    Blood thinners:  None    Interventional Therapies: 5/17/19 Lumbar Interlaminar  Epidural Steroid Injection under Fluoroscopic Guidance  16 C7-T1 CERVICAL EPIDURAL STEROID INJECTION   16 C7-T1 CERVICAL EPIDURAL STEROID INJECTION,   12, 11, 10/5/11, 11, 3/11/10 Cervical Epidural Steroid Injection        Relevant Surgeries:  None    Affecting sleep?  Yes    Affecting daily activities? yes    Depressive symptoms? no          · SI/HI? No    Work status: Retired      Review of Systems   Constitutional: Negative for activity change, appetite change, chills, fatigue, fever and unexpected weight change.   HENT: Negative for hearing loss.    Eyes: Negative for visual disturbance.   Respiratory: Negative for chest tightness and shortness of breath.    Cardiovascular: Negative for chest pain.   Gastrointestinal: Negative for abdominal pain, constipation, diarrhea, nausea and vomiting.   Genitourinary: Negative for difficulty urinating.   Musculoskeletal: Positive for back pain, gait problem, myalgias, neck pain and neck stiffness.   Skin: Negative for rash.   Neurological: Negative for dizziness, weakness, light-headedness, numbness and headaches.   Psychiatric/Behavioral: Positive for sleep disturbance. Negative for hallucinations and suicidal ideas. The patient is not nervous/anxious.        Past Medical History:   Diagnosis Date    Arthritis     Bipolar disorder     Depression     History of psychiatric care     History of psychiatric hospitalization     Hyperlipidemia     Hypertension     Insomnia     Lupus     Psychiatric problem     Self-harming behavior     Stroke     TIA - neck    Suicide attempt     Therapy     Thyroid disease        Past Surgical History:   Procedure Laterality Date    ADENOIDECTOMY      CARPAL TUNNEL RELEASE       SECTION      HYSTERECTOMY      INJECTION-STEROID-EPIDURAL-CERVICAL (C7-T1) N/A 2016    Performed [facility]    INJECTION-STEROID-EPIDURAL-CERVICAL (C7-T1) N/A 2016     [facility]    LUMBAR EPIDURAL STEROID INJECTION (L4-5 INTERLAMINAR) N/A 2019    [facility] Real Fitch Jr., [facility]    NASAL SEPTUM SURGERY  2014    Rt shoulder surgery Right 2004    scope    TONSILLECTOMY  2014    UVULOPALATOPHARYNGOPLASTY  2014       Social History     Socioeconomic History    Marital status: Single     Spouse name: Not on file    Number of children: 3    Years of education: Not on file    Highest education level: Not on file   Occupational History    Not on file   Social Needs    Financial resource strain: Not very hard    Food insecurity:     Worry: Often true     Inability: Often true    Transportation needs:     Medical: Yes     Non-medical: Patient refused   Tobacco Use    Smoking status: Former Smoker     Types: Cigarettes     Last attempt to quit: 2009     Years since quittin.8    Smokeless tobacco: Never Used   Substance and Sexual Activity    Alcohol use: No     Frequency: Never     Binge frequency: Never    Drug use: No    Sexual activity: Not Currently     Comment: , single   Lifestyle    Physical activity:     Days per week: Not on file     Minutes per session: Not on file    Stress: To some extent   Relationships    Social connections:     Talks on phone: More than three times a week     Gets together: More than three times a week     Attends Taoism service: Not on file     Active member of club or organization: No     Attends meetings of clubs or organizations: Never     Relationship status:    Other Topics Concern    Patient feels they ought to cut down on drinking/drug use Not Asked    Patient annoyed by others criticizing their drinking/drug use Not Asked    Patient has felt bad or guilty about drinking/drug use Not Asked    Patient has had a drink/used drugs as an eye opener in the AM Not Asked   Social History Narrative    Not on file       Review of patient's allergies  "indicates:  No Known Allergies    Current Outpatient Medications on File Prior to Visit   Medication Sig Dispense Refill    amlodipine-benazepril (LOTREL) 5-40 mg per capsule Take 1 capsule by mouth once daily. 30 capsule 5    busPIRone (BUSPAR) 15 MG tablet Take 1 tablet (15 mg total) by mouth 3 (three) times daily. 90 tablet 0    clonazePAM (KLONOPIN) 0.5 MG tablet Take 1 tablet (0.5 mg total) by mouth 2 (two) times daily as needed for Anxiety. 10 tablet 0    diclofenac (VOLTAREN) 75 MG EC tablet TAKE 1 TABLET (75 MG TOTAL) BY MOUTH 2 (TWO) TIMES DAILY. 60 tablet 1    escitalopram oxalate (LEXAPRO) 10 MG tablet Take 1 tablet (10 mg total) by mouth once daily. 30 tablet 0    ibuprofen (ADVIL,MOTRIN) 200 MG tablet Take 200 mg by mouth as needed for Pain.      lamotrigine (LAMICTAL) 100 MG tablet Take 100 mg by mouth once daily.   4    levothyroxine (SYNTHROID) 112 MCG tablet TAKE 1 TABLET (112 MCG TOTAL) BY MOUTH ONCE DAILY. 30 tablet 1    meclizine (ANTIVERT) 25 mg tablet Take 1 tablet (25 mg total) by mouth 3 (three) times daily as needed. 60 tablet 0    quetiapine (SEROQUEL) 400 MG tablet Take 200 mg by mouth nightly.   3     No current facility-administered medications on file prior to visit.        Objective:      BP (!) 122/55 (BP Location: Left arm, Patient Position: Sitting, BP Method: Medium (Manual))   Pulse 98   Resp 18   Ht 5' 5.5" (1.664 m)   Wt 120.6 kg (265 lb 14 oz)   BMI 43.57 kg/m²     Exam:  GEN:  Well developed, well nourished.  No acute distress.  Normal pain behavior.  HEENT:  No trauma.  Mucous membranes moist.  Nares patent bilaterally.  PSYCH: Normal affect. Thought content appropriate.  CHEST:  Breathing symmetric.  No audible wheezing.  ABD: Soft, non-distended.  SKIN:  Warm, pink, dry.  No rash on exposed areas.    EXT:  No cyanosis, clubbing, or edema.  No color change or changes in nail or hair growth.  NEURO/MUSCULOSKELETAL:  Fully alert, oriented, and appropriate. " Speech normal morenita. No cranial nerve deficits.   Gait:  Normal.  No trendelenburg sign bilaterally.   Motor Strength: 5/5 motor strength throughout lower extremities.   Sensory:  No sensory deficit in the lower extremities.   Reflexes:  2 + and symmetric throughout.  Downgoing Babinski's bilaterally.  No clonus or spasticity.  L-Spine:  Full ROM with pain on flexion.  Negative facet loading bilaterally.  Negative SLR bilaterally.    SI Joint/Hip:  Negative JARRETT bilaterally.  Negative FADIR bilaterally.   TTP over lumbar paraspinals, bilateral SI joints, hips, piriformis muscles, or GTB.    TTP over midline lower lumbar spine        Imaging:  Narrative     Lumbar spine, AP and lateral: There is a mild dextroscoliotic curvature of the lumbar spine.  Vertebral body heights are maintained.  There is minimal disc space narrowing with end plate sclerosis throughout.  There is facet arthropathy the lower lumbar spine.  No fracture or subluxation is identified.  Vascular calcifications are noted.   Impression      As above.      Electronically signed by: Sera Bergeron MD  Date: 02/06/17  Time: 13:15          Assessment:       Encounter Diagnoses   Name Primary?    DDD (degenerative disc disease), lumbar Yes    Spinal stenosis of lumbar region with neurogenic claudication     Lumbar facet arthropathy     Lumbar spondylosis          Plan:       Gricelda was seen today for follow-up.    Diagnoses and all orders for this visit:    DDD (degenerative disc disease), lumbar    Spinal stenosis of lumbar region with neurogenic claudication    Lumbar facet arthropathy    Lumbar spondylosis        Gricelda Gil is a 59 y.o. female returns  with continued chronic midline low back pain. Low suspicion for facet or sacroiliac etiology.  She is status post lumbar lumbar 4-5 interlaminar EYAL with reported 50-60% relief for 5-6 days and then pain slowly returned patient states following procedure she was performing a lot of her  ADLs because she felt better and her pain slowly returned.  Currently she says her pain relief is about 30% she states she will be moving to [place] in August and will seek to find a interventional pain physician in [place] she would no longer need our service.    1.  Pertinent imaging studies reviewed by me. Imaging results were discussed with patient.  2.  Lumbar MRI reviewed and discussed with patient today  3.  Continue medications as they are prescribed.  4.  Recommend  PT for ROM, strengthening, stretching and HEP.  5.  The above plan and management options were discussed at length with patient. Patient is in agreement with the above and verbalized understanding. Dr. Fitch  was consulted on this patient  and agrees with this plan.      Pako Drew, KATIE-C  Interventional Pain Management    Disclaimer: This note was partly generated using dictation software which may occasionally result in transcription errors.

## 2019-06-14 ENCOUNTER — OFFICE VISIT (OUTPATIENT)
Dept: PAIN MEDICINE | Facility: CLINIC | Age: 59
End: 2019-06-14
Payer: MEDICARE

## 2019-06-14 VITALS
HEIGHT: 66 IN | SYSTOLIC BLOOD PRESSURE: 122 MMHG | HEART RATE: 98 BPM | DIASTOLIC BLOOD PRESSURE: 55 MMHG | RESPIRATION RATE: 18 BRPM | BODY MASS INDEX: 42.73 KG/M2 | WEIGHT: 265.88 LBS

## 2019-06-14 DIAGNOSIS — M47.816 LUMBAR SPONDYLOSIS: ICD-10-CM

## 2019-06-14 DIAGNOSIS — M51.36 DDD (DEGENERATIVE DISC DISEASE), LUMBAR: Primary | ICD-10-CM

## 2019-06-14 DIAGNOSIS — M48.062 SPINAL STENOSIS OF LUMBAR REGION WITH NEUROGENIC CLAUDICATION: ICD-10-CM

## 2019-06-14 DIAGNOSIS — M47.816 LUMBAR FACET ARTHROPATHY: ICD-10-CM

## 2019-06-14 PROCEDURE — 99999 PR PBB SHADOW E&M-EST. PATIENT-LVL III: ICD-10-PCS | Mod: PBBFAC,,, | Performed by: NURSE PRACTITIONER

## 2019-06-14 PROCEDURE — 3008F BODY MASS INDEX DOCD: CPT | Mod: CPTII,S$GLB,, | Performed by: NURSE PRACTITIONER

## 2019-06-14 PROCEDURE — 3074F PR MOST RECENT SYSTOLIC BLOOD PRESSURE < 130 MM HG: ICD-10-PCS | Mod: CPTII,S$GLB,, | Performed by: NURSE PRACTITIONER

## 2019-06-14 PROCEDURE — 3074F SYST BP LT 130 MM HG: CPT | Mod: CPTII,S$GLB,, | Performed by: NURSE PRACTITIONER

## 2019-06-14 PROCEDURE — 3008F PR BODY MASS INDEX (BMI) DOCUMENTED: ICD-10-PCS | Mod: CPTII,S$GLB,, | Performed by: NURSE PRACTITIONER

## 2019-06-14 PROCEDURE — 99999 PR PBB SHADOW E&M-EST. PATIENT-LVL III: CPT | Mod: PBBFAC,,, | Performed by: NURSE PRACTITIONER

## 2019-06-14 PROCEDURE — 3078F PR MOST RECENT DIASTOLIC BLOOD PRESSURE < 80 MM HG: ICD-10-PCS | Mod: CPTII,S$GLB,, | Performed by: NURSE PRACTITIONER

## 2019-06-14 PROCEDURE — 99213 OFFICE O/P EST LOW 20 MIN: CPT | Mod: S$GLB,,, | Performed by: NURSE PRACTITIONER

## 2019-06-14 PROCEDURE — 3078F DIAST BP <80 MM HG: CPT | Mod: CPTII,S$GLB,, | Performed by: NURSE PRACTITIONER

## 2019-06-14 PROCEDURE — 99213 PR OFFICE/OUTPT VISIT, EST, LEVL III, 20-29 MIN: ICD-10-PCS | Mod: S$GLB,,, | Performed by: NURSE PRACTITIONER

## 2019-06-16 DIAGNOSIS — E03.4 HYPOTHYROIDISM DUE TO ACQUIRED ATROPHY OF THYROID: ICD-10-CM

## 2019-06-16 RX ORDER — LEVOTHYROXINE SODIUM 112 UG/1
TABLET ORAL
Qty: 30 TABLET | Refills: 1 | Status: SHIPPED | OUTPATIENT
Start: 2019-06-16 | End: 2019-07-11 | Stop reason: SDUPTHER

## 2019-07-11 ENCOUNTER — OFFICE VISIT (OUTPATIENT)
Dept: FAMILY MEDICINE | Facility: CLINIC | Age: 59
End: 2019-07-11
Payer: MEDICARE

## 2019-07-11 VITALS
WEIGHT: 268.19 LBS | BODY MASS INDEX: 43.1 KG/M2 | DIASTOLIC BLOOD PRESSURE: 82 MMHG | HEART RATE: 88 BPM | HEIGHT: 66 IN | RESPIRATION RATE: 16 BRPM | SYSTOLIC BLOOD PRESSURE: 138 MMHG

## 2019-07-11 DIAGNOSIS — F41.0 PANIC ATTACKS: ICD-10-CM

## 2019-07-11 DIAGNOSIS — I10 ESSENTIAL HYPERTENSION: ICD-10-CM

## 2019-07-11 DIAGNOSIS — E03.4 HYPOTHYROIDISM DUE TO ACQUIRED ATROPHY OF THYROID: ICD-10-CM

## 2019-07-11 PROCEDURE — 3079F DIAST BP 80-89 MM HG: CPT | Mod: CPTII,S$GLB,, | Performed by: FAMILY MEDICINE

## 2019-07-11 PROCEDURE — 3079F PR MOST RECENT DIASTOLIC BLOOD PRESSURE 80-89 MM HG: ICD-10-PCS | Mod: CPTII,S$GLB,, | Performed by: FAMILY MEDICINE

## 2019-07-11 PROCEDURE — 3075F PR MOST RECENT SYSTOLIC BLOOD PRESS GE 130-139MM HG: ICD-10-PCS | Mod: CPTII,S$GLB,, | Performed by: FAMILY MEDICINE

## 2019-07-11 PROCEDURE — 99999 PR PBB SHADOW E&M-EST. PATIENT-LVL III: ICD-10-PCS | Mod: PBBFAC,,, | Performed by: FAMILY MEDICINE

## 2019-07-11 PROCEDURE — 99214 PR OFFICE/OUTPT VISIT, EST, LEVL IV, 30-39 MIN: ICD-10-PCS | Mod: S$GLB,,, | Performed by: FAMILY MEDICINE

## 2019-07-11 PROCEDURE — 99214 OFFICE O/P EST MOD 30 MIN: CPT | Mod: S$GLB,,, | Performed by: FAMILY MEDICINE

## 2019-07-11 PROCEDURE — 3008F PR BODY MASS INDEX (BMI) DOCUMENTED: ICD-10-PCS | Mod: CPTII,S$GLB,, | Performed by: FAMILY MEDICINE

## 2019-07-11 PROCEDURE — 3075F SYST BP GE 130 - 139MM HG: CPT | Mod: CPTII,S$GLB,, | Performed by: FAMILY MEDICINE

## 2019-07-11 PROCEDURE — 3008F BODY MASS INDEX DOCD: CPT | Mod: CPTII,S$GLB,, | Performed by: FAMILY MEDICINE

## 2019-07-11 PROCEDURE — 99999 PR PBB SHADOW E&M-EST. PATIENT-LVL III: CPT | Mod: PBBFAC,,, | Performed by: FAMILY MEDICINE

## 2019-07-11 RX ORDER — ESCITALOPRAM OXALATE 10 MG/1
10 TABLET ORAL DAILY
Qty: 90 TABLET | Refills: 1 | Status: SHIPPED | OUTPATIENT
Start: 2019-07-11 | End: 2019-09-04 | Stop reason: SDUPTHER

## 2019-07-11 RX ORDER — BUSPIRONE HYDROCHLORIDE 15 MG/1
15 TABLET ORAL 3 TIMES DAILY
Qty: 270 TABLET | Refills: 1 | Status: SHIPPED | OUTPATIENT
Start: 2019-07-11 | End: 2019-11-25 | Stop reason: SDUPTHER

## 2019-07-11 RX ORDER — AMLODIPINE AND BENAZEPRIL HYDROCHLORIDE 5; 40 MG/1; MG/1
1 CAPSULE ORAL DAILY
Qty: 90 CAPSULE | Refills: 1 | Status: SHIPPED | OUTPATIENT
Start: 2019-07-11 | End: 2019-09-04 | Stop reason: SDUPTHER

## 2019-07-11 RX ORDER — LEVOTHYROXINE SODIUM 112 UG/1
TABLET ORAL
Qty: 90 TABLET | Refills: 1 | Status: SHIPPED | OUTPATIENT
Start: 2019-07-11 | End: 2019-11-25 | Stop reason: SDUPTHER

## 2019-07-11 RX ORDER — LAMOTRIGINE 100 MG/1
100 TABLET ORAL DAILY
Qty: 90 TABLET | Refills: 1 | Status: SHIPPED | OUTPATIENT
Start: 2019-07-11 | End: 2019-11-14

## 2019-07-11 NOTE — PROGRESS NOTES
Subjective:       Patient ID: Gricelda Gil is a 59 y.o. female.    Chief Complaint: Follow-up ( 1month )    Pt is a 59 y.o. female who presents for evaluation and management of   Encounter Diagnoses   Name Primary?    Essential hypertension     Panic attacks     Hypothyroidism due to acquired atrophy of thyroid    .  Doing well on current meds. Denies any side effects. Prevention is up to date.    Review of Systems   Constitutional: Negative for activity change and unexpected weight change.   HENT: Negative for hearing loss, rhinorrhea and trouble swallowing.    Eyes: Positive for discharge. Negative for visual disturbance.   Respiratory: Negative for chest tightness and wheezing.    Cardiovascular: Negative for chest pain and palpitations.   Gastrointestinal: Negative for blood in stool, constipation, diarrhea and vomiting.   Endocrine: Positive for polydipsia. Negative for polyuria.   Genitourinary: Negative for difficulty urinating, dysuria, hematuria and menstrual problem.   Musculoskeletal: Positive for arthralgias and neck pain. Negative for joint swelling.   Neurological: Negative for weakness and headaches.   Psychiatric/Behavioral: Negative for confusion, dysphoric mood and suicidal ideas.       Objective:      Physical Exam   Constitutional: She is oriented to person, place, and time. She appears well-developed and well-nourished.   HENT:   Head: Normocephalic and atraumatic.   Right Ear: External ear normal.   Left Ear: External ear normal.   Nose: Nose normal.   Mouth/Throat: Oropharynx is clear and moist.   Eyes: Pupils are equal, round, and reactive to light. EOM are normal.   Neck: Normal range of motion. Neck supple. No JVD present. No tracheal deviation present. No thyromegaly present.   Cardiovascular: Normal rate, normal heart sounds and intact distal pulses.   No murmur heard.  Pulmonary/Chest: Effort normal and breath sounds normal. No respiratory distress. She has no wheezes. She  has no rales. She exhibits no tenderness.   Abdominal: Soft. Bowel sounds are normal. She exhibits no distension and no mass. There is no tenderness. There is no rebound and no guarding.   Musculoskeletal: Normal range of motion. She exhibits no edema or tenderness.   Lymphadenopathy:     She has no cervical adenopathy.   Neurological: She is alert and oriented to person, place, and time. She has normal reflexes. She displays normal reflexes. No cranial nerve deficit. She exhibits normal muscle tone. Coordination normal.   Skin: Skin is warm and dry. No rash noted. No erythema. No pallor.   Psychiatric: She has a normal mood and affect. Her behavior is normal. Judgment and thought content normal.       Assessment:       1. Essential hypertension    2. Panic attacks    3. Hypothyroidism due to acquired atrophy of thyroid        Plan:   Gricelda was seen today for follow-up.    Diagnoses and all orders for this visit:    Essential hypertension  -     amlodipine-benazepril (LOTREL) 5-40 mg per capsule; Take 1 capsule by mouth once daily.    Panic attacks  -     busPIRone (BUSPAR) 15 MG tablet; Take 1 tablet (15 mg total) by mouth 3 (three) times daily.  -     escitalopram oxalate (LEXAPRO) 10 MG tablet; Take 1 tablet (10 mg total) by mouth once daily.    Hypothyroidism due to acquired atrophy of thyroid  -     levothyroxine (SYNTHROID) 112 MCG tablet; One po daily on empty stomach    Other orders  -     lamoTRIgine (LAMICTAL) 100 MG tablet; Take 1 tablet (100 mg total) by mouth once daily.      Problem List Items Addressed This Visit     HTN (hypertension)    Relevant Medications    amlodipine-benazepril (LOTREL) 5-40 mg per capsule    Hypothyroid    Relevant Medications    levothyroxine (SYNTHROID) 112 MCG tablet      Other Visit Diagnoses     Panic attacks        Relevant Medications    busPIRone (BUSPAR) 15 MG tablet    escitalopram oxalate (LEXAPRO) 10 MG tablet        She is moving to [place]   This is her last appt here    No follow-ups on file.

## 2019-07-14 RX ORDER — DICLOFENAC SODIUM 75 MG/1
TABLET, DELAYED RELEASE ORAL
Qty: 60 TABLET | Refills: 1 | Status: SHIPPED | OUTPATIENT
Start: 2019-07-14 | End: 2019-11-25 | Stop reason: SDUPTHER

## 2019-08-02 ENCOUNTER — TELEPHONE (OUTPATIENT)
Dept: FAMILY MEDICINE | Facility: CLINIC | Age: 59
End: 2019-08-02

## 2019-08-02 NOTE — TELEPHONE ENCOUNTER
Spoke with patient and she states that she is moving to [place] next week and Dr. Kimble will no longer be her PCP.

## 2019-09-04 DIAGNOSIS — F41.0 PANIC ATTACKS: ICD-10-CM

## 2019-09-04 DIAGNOSIS — I10 ESSENTIAL HYPERTENSION: ICD-10-CM

## 2019-09-04 RX ORDER — ESCITALOPRAM OXALATE 10 MG/1
10 TABLET ORAL DAILY
Qty: 90 TABLET | Refills: 1 | Status: SHIPPED | OUTPATIENT
Start: 2019-09-04 | End: 2019-11-25 | Stop reason: SDUPTHER

## 2019-09-04 RX ORDER — AMLODIPINE AND BENAZEPRIL HYDROCHLORIDE 5; 40 MG/1; MG/1
1 CAPSULE ORAL DAILY
Qty: 90 CAPSULE | Refills: 1 | Status: SHIPPED | OUTPATIENT
Start: 2019-09-04 | End: 2020-01-03 | Stop reason: SDUPTHER

## 2019-09-04 NOTE — TELEPHONE ENCOUNTER
----- Message from Mahogany Giles sent at 2019  3:59 PM CDT -----  Contact: Self  Gricelda Gil  MRN: [id number]  : 1960  PCP: No primary care provider on file.  Home Phone      [phone]  Work Phone      Not on file.  Mobile          [phone]      MESSAGE:   Pt requesting refill or new Rx.   Is this a refill or new RX:  refill  RX name and strength:   escitalopram oxalate (LEXAPRO) 10 MG tablet  amlodipine-benazepril (LOTREL) 5-40 mg per capsule    Last office visit: 2019  Is this a 30-day or 90-day RX:  90-day  Pharmacy name and location:  [facility] F[facility] [phone]  Comments:      Phone: [phone]

## 2019-11-14 ENCOUNTER — APPOINTMENT (OUTPATIENT)
Dept: RADIOLOGY | Facility: CLINIC | Age: 59
End: 2019-11-14
Attending: FAMILY MEDICINE
Payer: MEDICARE

## 2019-11-14 ENCOUNTER — OFFICE VISIT (OUTPATIENT)
Dept: FAMILY MEDICINE | Facility: CLINIC | Age: 59
End: 2019-11-14
Payer: MEDICARE

## 2019-11-14 VITALS
WEIGHT: 268.94 LBS | RESPIRATION RATE: 18 BRPM | HEART RATE: 74 BPM | DIASTOLIC BLOOD PRESSURE: 80 MMHG | BODY MASS INDEX: 43.22 KG/M2 | HEIGHT: 66 IN | SYSTOLIC BLOOD PRESSURE: 122 MMHG

## 2019-11-14 DIAGNOSIS — Z12.31 BREAST CANCER SCREENING BY MAMMOGRAM: Primary | ICD-10-CM

## 2019-11-14 DIAGNOSIS — M25.512 CHRONIC LEFT SHOULDER PAIN: ICD-10-CM

## 2019-11-14 DIAGNOSIS — E78.5 DYSLIPIDEMIA: ICD-10-CM

## 2019-11-14 DIAGNOSIS — I10 ESSENTIAL HYPERTENSION: ICD-10-CM

## 2019-11-14 DIAGNOSIS — G89.29 CHRONIC LEFT SHOULDER PAIN: ICD-10-CM

## 2019-11-14 DIAGNOSIS — M25.562 CHRONIC PAIN OF LEFT KNEE: ICD-10-CM

## 2019-11-14 DIAGNOSIS — G89.29 CHRONIC PAIN OF LEFT KNEE: ICD-10-CM

## 2019-11-14 PROCEDURE — 99214 PR OFFICE/OUTPT VISIT, EST, LEVL IV, 30-39 MIN: ICD-10-PCS | Mod: S$GLB,,, | Performed by: FAMILY MEDICINE

## 2019-11-14 PROCEDURE — 99999 PR PBB SHADOW E&M-EST. PATIENT-LVL IV: ICD-10-PCS | Mod: PBBFAC,,, | Performed by: FAMILY MEDICINE

## 2019-11-14 PROCEDURE — 99499 RISK ADDL DX/OHS AUDIT: ICD-10-PCS | Mod: S$PBB,,, | Performed by: FAMILY MEDICINE

## 2019-11-14 PROCEDURE — 80061 LIPID PANEL: CPT

## 2019-11-14 PROCEDURE — 80053 COMPREHEN METABOLIC PANEL: CPT

## 2019-11-14 PROCEDURE — 99499 UNLISTED E&M SERVICE: CPT | Mod: S$PBB,,, | Performed by: FAMILY MEDICINE

## 2019-11-14 PROCEDURE — 90686 IIV4 VACC NO PRSV 0.5 ML IM: CPT | Mod: PBBFAC

## 2019-11-14 PROCEDURE — 99214 OFFICE O/P EST MOD 30 MIN: CPT | Mod: PBBFAC,25 | Performed by: FAMILY MEDICINE

## 2019-11-14 PROCEDURE — 99214 OFFICE O/P EST MOD 30 MIN: CPT | Mod: S$GLB,,, | Performed by: FAMILY MEDICINE

## 2019-11-14 PROCEDURE — 36415 COLL VENOUS BLD VENIPUNCTURE: CPT | Mod: PBBFAC

## 2019-11-14 PROCEDURE — 99999 PR PBB SHADOW E&M-EST. PATIENT-LVL IV: CPT | Mod: PBBFAC,,, | Performed by: FAMILY MEDICINE

## 2019-11-14 PROCEDURE — 73030 X-RAY EXAM OF SHOULDER: CPT | Mod: TC,PO,LT

## 2019-11-14 NOTE — PROGRESS NOTES
Subjective:       Patient ID: Gricelda Gil is a 59 y.o. female.    Chief Complaint: Knee Pain; Back Pain; and Shoulder Pain    Pt is a 59 y.o. female who presents for evaluation and management of   Encounter Diagnoses   Name Primary?    Breast cancer screening by mammogram Yes    Chronic pain of left knee     Chronic left shoulder pain     Dyslipidemia     Essential hypertension    .  Doing well on current meds. Denies any side effects. Prevention is up to date.    Review of Systems   Constitutional: Negative for fever.   Cardiovascular: Negative for chest pain.   Gastrointestinal: Negative for abdominal pain.   Genitourinary: Positive for pelvic pain. Negative for dysuria and hematuria.   Musculoskeletal: Positive for back pain.   Neurological: Positive for weakness. Negative for numbness and headaches.       Objective:      Physical Exam   Constitutional: She is oriented to person, place, and time. She appears well-developed and well-nourished.   HENT:   Head: Normocephalic and atraumatic.   Right Ear: External ear normal.   Left Ear: External ear normal.   Nose: Nose normal.   Mouth/Throat: Oropharynx is clear and moist.   Eyes: Pupils are equal, round, and reactive to light. EOM are normal.   Neck: Normal range of motion. Neck supple. No JVD present. No tracheal deviation present. No thyromegaly present.   Cardiovascular: Normal rate, normal heart sounds and intact distal pulses.   No murmur heard.  Pulmonary/Chest: Effort normal and breath sounds normal. No respiratory distress. She has no wheezes. She has no rales. She exhibits no tenderness.   Abdominal: Soft. Bowel sounds are normal. She exhibits no distension and no mass. There is no tenderness. There is no rebound and no guarding.   Musculoskeletal: Normal range of motion. She exhibits no edema or tenderness.   Negative impingement signs 1 and 2  5/5 supraspinatous   5/5 subscapularis  5/5 teres minor and infraspinatous    Negative  lachmann's, no joint laxity with varus or valgus stress. No effusion. No PF pain  Some pain with omega      Lymphadenopathy:     She has no cervical adenopathy.   Neurological: She is alert and oriented to person, place, and time. She has normal reflexes. She displays normal reflexes. No cranial nerve deficit. She exhibits normal muscle tone. Coordination normal.   Skin: Skin is warm and dry. No rash noted. No erythema. No pallor.   Psychiatric: She has a normal mood and affect. Her behavior is normal. Judgment and thought content normal.       Assessment:       1. Breast cancer screening by mammogram    2. Chronic pain of left knee    3. Chronic left shoulder pain    4. Dyslipidemia    5. Essential hypertension        Plan:   Gricelda was seen today for knee pain, back pain and shoulder pain.    Diagnoses and all orders for this visit:    Breast cancer screening by mammogram    Chronic pain of left knee  -     Ambulatory Referral to Physical/Occupational Therapy    Chronic left shoulder pain  -     Ambulatory Referral to Physical/Occupational Therapy  -     X-Ray Shoulder 2 or More Views Left; Future    Dyslipidemia  -     Lipid panel; Future  -     Comprehensive metabolic panel; Future  -     Comprehensive metabolic panel  -     Lipid panel    Essential hypertension    Other orders  -     Influenza - Quadrivalent (PF)      Problem List Items Addressed This Visit     Dyslipidemia    Relevant Orders    Lipid panel    Comprehensive metabolic panel    HTN (hypertension)      Other Visit Diagnoses     Breast cancer screening by mammogram    -  Primary    Chronic pain of left knee        Relevant Orders    Ambulatory Referral to Physical/Occupational Therapy    Chronic left shoulder pain        Relevant Orders    Ambulatory Referral to Physical/Occupational Therapy    X-Ray Shoulder 2 or More Views Left (Completed)        Shoulder pain seems to be glenohumeral in origin   Knee seems to be meniscus? Omega is equivocal    No follow-ups on file.

## 2019-11-15 LAB
ALBUMIN SERPL BCP-MCNC: 3.8 G/DL (ref 3.5–5.2)
ALP SERPL-CCNC: 105 U/L (ref 55–135)
ALT SERPL W/O P-5'-P-CCNC: 26 U/L (ref 10–44)
ANION GAP SERPL CALC-SCNC: 6 MMOL/L (ref 8–16)
AST SERPL-CCNC: 27 U/L (ref 10–40)
BILIRUB SERPL-MCNC: 0.6 MG/DL (ref 0.1–1)
BUN SERPL-MCNC: 17 MG/DL (ref 6–20)
CALCIUM SERPL-MCNC: 9.5 MG/DL (ref 8.7–10.5)
CHLORIDE SERPL-SCNC: 106 MMOL/L (ref 95–110)
CHOLEST SERPL-MCNC: 213 MG/DL (ref 120–199)
CHOLEST/HDLC SERPL: 6.3 {RATIO} (ref 2–5)
CO2 SERPL-SCNC: 28 MMOL/L (ref 23–29)
CREAT SERPL-MCNC: 1.1 MG/DL (ref 0.5–1.4)
EST. GFR  (AFRICAN AMERICAN): >60 ML/MIN/1.73 M^2
EST. GFR  (NON AFRICAN AMERICAN): 55 ML/MIN/1.73 M^2
GLUCOSE SERPL-MCNC: 88 MG/DL (ref 70–110)
HDLC SERPL-MCNC: 34 MG/DL (ref 40–75)
HDLC SERPL: 16 % (ref 20–50)
LDLC SERPL CALC-MCNC: 151.8 MG/DL (ref 63–159)
NONHDLC SERPL-MCNC: 179 MG/DL
POTASSIUM SERPL-SCNC: 4.7 MMOL/L (ref 3.5–5.1)
PROT SERPL-MCNC: 7.2 G/DL (ref 6–8.4)
SODIUM SERPL-SCNC: 140 MMOL/L (ref 136–145)
TRIGL SERPL-MCNC: 136 MG/DL (ref 30–150)

## 2019-11-17 DIAGNOSIS — E78.5 DYSLIPIDEMIA: Primary | ICD-10-CM

## 2019-11-17 RX ORDER — ATORVASTATIN CALCIUM 10 MG/1
10 TABLET, FILM COATED ORAL DAILY
Qty: 30 TABLET | Refills: 3 | Status: SHIPPED | OUTPATIENT
Start: 2019-11-17 | End: 2020-03-10

## 2019-11-18 NOTE — PROGRESS NOTES
Her cholesterol is too high  Starting lipitor  repeat labs in 3months.     Return to clinic with appt in 3 months

## 2019-11-21 DIAGNOSIS — Z12.11 COLON CANCER SCREENING: ICD-10-CM

## 2019-11-25 DIAGNOSIS — E03.4 HYPOTHYROIDISM DUE TO ACQUIRED ATROPHY OF THYROID: ICD-10-CM

## 2019-11-25 DIAGNOSIS — F41.0 PANIC ATTACKS: ICD-10-CM

## 2019-11-25 RX ORDER — BUSPIRONE HYDROCHLORIDE 15 MG/1
15 TABLET ORAL 3 TIMES DAILY
Qty: 270 TABLET | Refills: 1 | Status: SHIPPED | OUTPATIENT
Start: 2019-11-25 | End: 2020-06-04

## 2019-11-25 RX ORDER — ESCITALOPRAM OXALATE 10 MG/1
10 TABLET ORAL DAILY
Qty: 90 TABLET | Refills: 1 | Status: SHIPPED | OUTPATIENT
Start: 2019-11-25 | End: 2019-11-26 | Stop reason: SDUPTHER

## 2019-11-25 RX ORDER — LEVOTHYROXINE SODIUM 112 UG/1
TABLET ORAL
Qty: 90 TABLET | Refills: 1 | Status: SHIPPED | OUTPATIENT
Start: 2019-11-25 | End: 2020-01-28

## 2019-11-25 RX ORDER — QUETIAPINE FUMARATE 400 MG/1
200 TABLET, FILM COATED ORAL NIGHTLY
Qty: 90 TABLET | Refills: 3 | Status: SHIPPED | OUTPATIENT
Start: 2019-11-25 | End: 2020-11-30 | Stop reason: SDUPTHER

## 2019-11-25 RX ORDER — CLONAZEPAM 0.5 MG/1
0.5 TABLET ORAL 2 TIMES DAILY PRN
Qty: 10 TABLET | Refills: 0 | Status: SHIPPED | OUTPATIENT
Start: 2019-11-25 | End: 2019-12-17 | Stop reason: SDUPTHER

## 2019-11-25 RX ORDER — DICLOFENAC SODIUM 75 MG/1
TABLET, DELAYED RELEASE ORAL
Qty: 60 TABLET | Refills: 1 | Status: SHIPPED | OUTPATIENT
Start: 2019-11-25 | End: 2020-01-02

## 2019-11-25 NOTE — TELEPHONE ENCOUNTER
----- Message from Yasmine Pretty sent at 2019 10:21 AM CST -----  Contact: [facility] requesting prescription for patient.    Is this a refill or new RX:  refill  RX name and strength:    busPIRone (BUSPAR) 15 MG tablet   clonazePAM (KLONOPIN) 0.5 MG tablet ()   diclofenac (VOLTAREN) 75 MG EC tablet   escitalopram oxalate (LEXAPRO) 10 MG tablet   levothyroxine (SYNTHROID) 112 MCG tablet   quetiapine (SEROQUEL) 400 MG tablet  Last office visit:   Last fill date:  Is this a 30-day or 90-day RX:    Pharmacy name and location:  [facility]

## 2019-11-26 DIAGNOSIS — F41.0 PANIC ATTACKS: ICD-10-CM

## 2019-11-26 RX ORDER — ESCITALOPRAM OXALATE 10 MG/1
10 TABLET ORAL DAILY
Qty: 90 TABLET | Refills: 1 | Status: SHIPPED | OUTPATIENT
Start: 2019-11-26 | End: 2020-03-10 | Stop reason: SDUPTHER

## 2019-12-17 RX ORDER — CLONAZEPAM 0.5 MG/1
0.5 TABLET ORAL 2 TIMES DAILY PRN
Qty: 10 TABLET | Refills: 0 | Status: SHIPPED | OUTPATIENT
Start: 2019-12-17 | End: 2020-01-14 | Stop reason: SDUPTHER

## 2019-12-17 NOTE — TELEPHONE ENCOUNTER
RX name and strength: clonazePAM (KLONOPIN) 0.5 MG tablet  Last office visit: 11/14/19  Is this a 30-day or 90-day RX:  30  Pharmacy name and location:  [facility] [address]    Klonopin last printed on 11/25/19

## 2019-12-17 NOTE — TELEPHONE ENCOUNTER
----- Message from Yael Marquez sent at 2019  3:48 PM CST -----  Contact: self  Gricelda Gil  MRN: [id number]  : 1960  PCP: Major Kimble  Home Phone      [phone]  Work Phone      Not on file.  Mobile          [phone]      MESSAGE:   Pt requesting refill or new Rx.   Is this a refill or new RX:  refill  RX name and strength: clonazePAM (KLONOPIN) 0.5 MG tablet  Last office visit: 19  Is this a 30-day or 90-day RX:  30  Pharmacy name and location:  [facility] [address]  Comments:      Phone:  [phone]

## 2020-01-02 RX ORDER — DICLOFENAC SODIUM 75 MG/1
TABLET, DELAYED RELEASE ORAL
Qty: 60 TABLET | Refills: 0 | Status: SHIPPED | OUTPATIENT
Start: 2020-01-02 | End: 2020-02-23

## 2020-01-03 DIAGNOSIS — I10 ESSENTIAL HYPERTENSION: ICD-10-CM

## 2020-01-03 NOTE — TELEPHONE ENCOUNTER
----- Message from Yael Marquez sent at 1/3/2020  9:31 AM CST -----    MESSAGE:   Pharmacy requesting a refill.   Is this a refill or new RX:  refill  RX name and strength: amlodipine-benazepril (LOTREL) 5-40 mg per capsule  Last fill date:09/04/19  Last office visit: 11/14/19  Is this a 30-day or 90-day RX:    Pharmacy name and location:  [facility] [address]  Comments:

## 2020-01-04 RX ORDER — AMLODIPINE AND BENAZEPRIL HYDROCHLORIDE 5; 40 MG/1; MG/1
1 CAPSULE ORAL DAILY
Qty: 90 CAPSULE | Refills: 1 | Status: SHIPPED | OUTPATIENT
Start: 2020-01-04 | End: 2020-01-30

## 2020-01-14 ENCOUNTER — OFFICE VISIT (OUTPATIENT)
Dept: FAMILY MEDICINE | Facility: CLINIC | Age: 60
End: 2020-01-14
Payer: MEDICARE

## 2020-01-14 ENCOUNTER — APPOINTMENT (OUTPATIENT)
Dept: RADIOLOGY | Facility: CLINIC | Age: 60
End: 2020-01-14
Attending: FAMILY MEDICINE
Payer: MEDICARE

## 2020-01-14 VITALS
DIASTOLIC BLOOD PRESSURE: 78 MMHG | RESPIRATION RATE: 18 BRPM | SYSTOLIC BLOOD PRESSURE: 110 MMHG | HEART RATE: 96 BPM | WEIGHT: 267.19 LBS | HEIGHT: 66 IN | BODY MASS INDEX: 42.94 KG/M2

## 2020-01-14 DIAGNOSIS — S83.8X2D MENISCAL INJURY, LEFT, SUBSEQUENT ENCOUNTER: ICD-10-CM

## 2020-01-14 DIAGNOSIS — G89.29 CHRONIC PAIN OF LEFT KNEE: ICD-10-CM

## 2020-01-14 DIAGNOSIS — G89.29 CHRONIC PAIN OF LEFT KNEE: Primary | ICD-10-CM

## 2020-01-14 DIAGNOSIS — M25.562 CHRONIC PAIN OF LEFT KNEE: ICD-10-CM

## 2020-01-14 DIAGNOSIS — M25.562 CHRONIC PAIN OF LEFT KNEE: Primary | ICD-10-CM

## 2020-01-14 PROCEDURE — 99999 PR PBB SHADOW E&M-EST. PATIENT-LVL IV: CPT | Mod: PBBFAC,,, | Performed by: FAMILY MEDICINE

## 2020-01-14 PROCEDURE — 3078F DIAST BP <80 MM HG: CPT | Mod: CPTII,S$GLB,, | Performed by: FAMILY MEDICINE

## 2020-01-14 PROCEDURE — 3074F PR MOST RECENT SYSTOLIC BLOOD PRESSURE < 130 MM HG: ICD-10-PCS | Mod: CPTII,S$GLB,, | Performed by: FAMILY MEDICINE

## 2020-01-14 PROCEDURE — 99499 RISK ADDL DX/OHS AUDIT: ICD-10-PCS | Mod: S$GLB,,, | Performed by: FAMILY MEDICINE

## 2020-01-14 PROCEDURE — 3078F PR MOST RECENT DIASTOLIC BLOOD PRESSURE < 80 MM HG: ICD-10-PCS | Mod: CPTII,S$GLB,, | Performed by: FAMILY MEDICINE

## 2020-01-14 PROCEDURE — 3074F SYST BP LT 130 MM HG: CPT | Mod: CPTII,S$GLB,, | Performed by: FAMILY MEDICINE

## 2020-01-14 PROCEDURE — 99999 PR PBB SHADOW E&M-EST. PATIENT-LVL IV: ICD-10-PCS | Mod: PBBFAC,,, | Performed by: FAMILY MEDICINE

## 2020-01-14 PROCEDURE — 73560 X-RAY EXAM OF KNEE 1 OR 2: CPT | Mod: TC,PO,LT

## 2020-01-14 PROCEDURE — 99213 OFFICE O/P EST LOW 20 MIN: CPT | Mod: S$GLB,,, | Performed by: FAMILY MEDICINE

## 2020-01-14 PROCEDURE — 3008F BODY MASS INDEX DOCD: CPT | Mod: CPTII,S$GLB,, | Performed by: FAMILY MEDICINE

## 2020-01-14 PROCEDURE — 99213 PR OFFICE/OUTPT VISIT, EST, LEVL III, 20-29 MIN: ICD-10-PCS | Mod: S$GLB,,, | Performed by: FAMILY MEDICINE

## 2020-01-14 PROCEDURE — 3008F PR BODY MASS INDEX (BMI) DOCUMENTED: ICD-10-PCS | Mod: CPTII,S$GLB,, | Performed by: FAMILY MEDICINE

## 2020-01-14 PROCEDURE — 99499 UNLISTED E&M SERVICE: CPT | Mod: S$GLB,,, | Performed by: FAMILY MEDICINE

## 2020-01-14 RX ORDER — CLONAZEPAM 0.5 MG/1
0.5 TABLET ORAL 2 TIMES DAILY PRN
Qty: 15 TABLET | Refills: 0 | Status: SHIPPED | OUTPATIENT
Start: 2020-01-14 | End: 2020-01-14 | Stop reason: SDUPTHER

## 2020-01-14 RX ORDER — CLONAZEPAM 0.5 MG/1
0.5 TABLET ORAL 2 TIMES DAILY PRN
Qty: 180 TABLET | Refills: 0 | Status: SHIPPED | OUTPATIENT
Start: 2020-01-14 | End: 2020-01-30 | Stop reason: SDUPTHER

## 2020-01-14 NOTE — PROGRESS NOTES
Subjective:       Patient ID: Gricelda Gil is a 59 y.o. female.    Chief Complaint: Left knee pain    Pt is a 59 y.o. female who presents for evaluation and management of   Encounter Diagnosis   Name Primary?    Chronic pain of left knee Yes   .Months now   1 Month PT wth no relief   Fell couple of months ago and its gotten worse since that time     Doing well on current meds. Denies any side effects. Prevention is up to date.  Review of Systems   Constitutional: Negative for activity change and unexpected weight change.   HENT: Negative for hearing loss, rhinorrhea and trouble swallowing.    Eyes: Negative for discharge and visual disturbance.   Respiratory: Negative for chest tightness and wheezing.    Cardiovascular: Negative for chest pain and palpitations.   Gastrointestinal: Negative for blood in stool, constipation, diarrhea and vomiting.   Endocrine: Negative for polydipsia and polyuria.   Genitourinary: Negative for difficulty urinating, dysuria, hematuria and menstrual problem.   Musculoskeletal: Positive for arthralgias, joint swelling and neck pain.   Neurological: Negative for weakness and headaches.   Psychiatric/Behavioral: Positive for dysphoric mood. Negative for confusion.       Objective:      Physical Exam   Constitutional: She is oriented to person, place, and time. She appears well-developed and well-nourished.   HENT:   Head: Normocephalic and atraumatic.   Right Ear: External ear normal.   Left Ear: External ear normal.   Nose: Nose normal.   Mouth/Throat: Oropharynx is clear and moist.   Eyes: Pupils are equal, round, and reactive to light. EOM are normal.   Neck: Normal range of motion. Neck supple. No JVD present. No tracheal deviation present. No thyromegaly present.   Cardiovascular: Normal rate, normal heart sounds and intact distal pulses.   No murmur heard.  Pulmonary/Chest: Effort normal and breath sounds normal. No respiratory distress. She has no wheezes. She has no  rales. She exhibits no tenderness.   Abdominal: Soft. Bowel sounds are normal. She exhibits no distension and no mass. There is no tenderness. There is no rebound and no guarding.   Musculoskeletal: Normal range of motion. She exhibits no edema or tenderness.   Lymphadenopathy:     She has no cervical adenopathy.   Neurological: She is alert and oriented to person, place, and time. She has normal reflexes. She displays normal reflexes. No cranial nerve deficit. She exhibits normal muscle tone. Coordination normal.   Skin: Skin is warm and dry. No rash noted. No erythema. No pallor.   Psychiatric: She has a normal mood and affect. Her behavior is normal. Judgment and thought content normal.       Assessment:       1. Chronic pain of left knee        Plan:   Gricelda was seen today for left knee pain.    Diagnoses and all orders for this visit:    Chronic pain of left knee  -     X-Ray Knee 1 or 2 View Left; Future    Meniscal injury, left, subsequent encounter  -     MRI Knee Without Contrast Left; Future      Problem List Items Addressed This Visit     None      Visit Diagnoses     Chronic pain of left knee    -  Primary    Relevant Orders    X-Ray Knee 1 or 2 View Left        No follow-ups on file.

## 2020-01-14 NOTE — TELEPHONE ENCOUNTER
Patient seen in office today, forgot to requesting refill for Clonazepam 0.5mg takes one tablet by mouth twice a day.     Requesting 90 day refill sent to [facility].    Advise

## 2020-01-15 NOTE — PROGRESS NOTES
Patient, Gricelda Gil (MRN #[id number]), presented with a recorded BMI of 43.79 kg/m^2 consistent with the definition of morbid obesity (ICD-10 E66.01). The patient's morbid obesity was monitored, evaluated, addressed and/or treated. This addendum to the medical record is made on 01/15/2020.

## 2020-01-16 ENCOUNTER — HOSPITAL ENCOUNTER (OUTPATIENT)
Dept: RADIOLOGY | Facility: HOSPITAL | Age: 60
Discharge: HOME | End: 2020-01-16
Attending: FAMILY MEDICINE
Payer: MEDICARE

## 2020-01-16 DIAGNOSIS — S83.8X2D MENISCAL INJURY, LEFT, SUBSEQUENT ENCOUNTER: ICD-10-CM

## 2020-01-16 PROCEDURE — 73721 MRI JNT OF LWR EXTRE W/O DYE: CPT | Mod: TC,LT

## 2020-01-17 ENCOUNTER — TELEPHONE (OUTPATIENT)
Dept: FAMILY MEDICINE | Facility: CLINIC | Age: 60
End: 2020-01-17

## 2020-01-17 NOTE — PROGRESS NOTES
She does not have anything torn, but she has severe degenerative arthritis of that knee  She would benefit from a knee injection

## 2020-01-17 NOTE — TELEPHONE ENCOUNTER
----- Message from Major Kimble MD sent at 1/16/2020  6:28 PM CST -----  She does not have anything torn, but she has severe degenerative arthritis of that knee  She would benefit from a knee injection

## 2020-01-20 NOTE — TELEPHONE ENCOUNTER
Continue with therapy. If she doesn't get any improvement with PT we can send her to a orthopedic

## 2020-01-20 NOTE — TELEPHONE ENCOUNTER
Notified patient of results and patient verbalized understanding.   Pt stated that she is not doing the injections. She would like to know if there is anything else she can do or should she just continue with therapy?

## 2020-01-28 DIAGNOSIS — E03.4 HYPOTHYROIDISM DUE TO ACQUIRED ATROPHY OF THYROID: ICD-10-CM

## 2020-01-28 RX ORDER — LEVOTHYROXINE SODIUM 112 UG/1
TABLET ORAL
Qty: 90 TABLET | Refills: 1 | Status: SHIPPED | OUTPATIENT
Start: 2020-01-28 | End: 2021-01-21 | Stop reason: SDUPTHER

## 2020-01-28 RX ORDER — LAMOTRIGINE 100 MG/1
TABLET ORAL
Qty: 90 TABLET | Refills: 1 | Status: SHIPPED | OUTPATIENT
Start: 2020-01-28 | End: 2020-06-24

## 2020-01-29 ENCOUNTER — TELEPHONE (OUTPATIENT)
Dept: FAMILY MEDICINE | Facility: CLINIC | Age: 60
End: 2020-01-29

## 2020-01-29 NOTE — TELEPHONE ENCOUNTER
----- Message from Leodan Walters sent at 2020 12:23 PM CST -----  Contact: Patient  Gricelda Gil  MRN: [id number]  : 1960  PCP: Major Kimble  Home Phone      [phone]  Work Phone      Not on file.  Mobile          [phone]      MESSAGE: patient is to continue PT for 2-3 wks -- waiting on approval from insurance -- wants to know if Dr Kimble' nurse can call insurance for approval     Call [phone]    PCP: [place]

## 2020-01-29 NOTE — TELEPHONE ENCOUNTER
Spoke to [facility] representative. States no new referral is needed. States they have submitted a request for additional visits to [facility]. States they are waiting for approval. Nothing needed from PCP.    Patient notified.

## 2020-01-30 DIAGNOSIS — I10 ESSENTIAL HYPERTENSION: ICD-10-CM

## 2020-01-30 RX ORDER — CLONAZEPAM 0.5 MG/1
0.5 TABLET ORAL 2 TIMES DAILY PRN
Qty: 60 TABLET | Refills: 0 | Status: SHIPPED | OUTPATIENT
Start: 2020-01-30 | End: 2020-03-01

## 2020-01-30 RX ORDER — AMLODIPINE AND BENAZEPRIL HYDROCHLORIDE 5; 40 MG/1; MG/1
CAPSULE ORAL
Qty: 90 CAPSULE | Refills: 1 | Status: SHIPPED | OUTPATIENT
Start: 2020-01-30 | End: 2020-08-31 | Stop reason: SDUPTHER

## 2020-01-30 NOTE — TELEPHONE ENCOUNTER
----- Message from Yael Marquez sent at 2020  1:03 PM CST -----  Contact: self  Gricelda Gil  MRN: [id number]  : 1960  PCP: Major Kimble  Home Phone      [phone]  Work Phone      Not on file.  Mobile          [phone]      MESSAGE:   Pt requesting refill or new Rx.   Is this a refill or new RX:  refill  RX name and strength: clonazePAM (KLONOPIN) 0.5 MG tablet  Last office visit: 20  Is this a 30-day or 90-day RX:  30  [facility] [place]  Comments:      Phone:  [phone]

## 2020-01-30 NOTE — TELEPHONE ENCOUNTER
LOV: 01/14/2020  [facility] to [facility] but they have not dispensed this yet. Per  the last fill was 12/11/19.

## 2020-02-23 RX ORDER — DICLOFENAC SODIUM 75 MG/1
TABLET, DELAYED RELEASE ORAL
Qty: 60 TABLET | Refills: 0 | Status: SHIPPED | OUTPATIENT
Start: 2020-02-23 | End: 2020-06-24 | Stop reason: SDUPTHER

## 2020-03-01 RX ORDER — CLONAZEPAM 0.5 MG/1
0.5 TABLET ORAL 2 TIMES DAILY PRN
Qty: 60 TABLET | Refills: 0 | Status: SHIPPED | OUTPATIENT
Start: 2020-03-01 | End: 2020-03-10 | Stop reason: SDUPTHER

## 2020-03-10 ENCOUNTER — OFFICE VISIT (OUTPATIENT)
Dept: FAMILY MEDICINE | Facility: CLINIC | Age: 60
End: 2020-03-10
Payer: MEDICARE

## 2020-03-10 VITALS
HEART RATE: 72 BPM | WEIGHT: 271.19 LBS | BODY MASS INDEX: 45.18 KG/M2 | RESPIRATION RATE: 20 BRPM | SYSTOLIC BLOOD PRESSURE: 120 MMHG | HEIGHT: 65 IN | DIASTOLIC BLOOD PRESSURE: 80 MMHG

## 2020-03-10 DIAGNOSIS — E78.5 DYSLIPIDEMIA: Primary | ICD-10-CM

## 2020-03-10 DIAGNOSIS — F41.0 PANIC ATTACKS: ICD-10-CM

## 2020-03-10 DIAGNOSIS — M17.12 PRIMARY OSTEOARTHRITIS OF LEFT KNEE: ICD-10-CM

## 2020-03-10 DIAGNOSIS — E66.9 OBESITY, UNSPECIFIED CLASSIFICATION, UNSPECIFIED OBESITY TYPE, UNSPECIFIED WHETHER SERIOUS COMORBIDITY PRESENT: ICD-10-CM

## 2020-03-10 PROCEDURE — 3074F PR MOST RECENT SYSTOLIC BLOOD PRESSURE < 130 MM HG: ICD-10-PCS | Mod: CPTII,S$GLB,, | Performed by: FAMILY MEDICINE

## 2020-03-10 PROCEDURE — 3074F SYST BP LT 130 MM HG: CPT | Mod: CPTII,S$GLB,, | Performed by: FAMILY MEDICINE

## 2020-03-10 PROCEDURE — 80053 COMPREHEN METABOLIC PANEL: CPT

## 2020-03-10 PROCEDURE — 99214 PR OFFICE/OUTPT VISIT, EST, LEVL IV, 30-39 MIN: ICD-10-PCS | Mod: S$GLB,,, | Performed by: FAMILY MEDICINE

## 2020-03-10 PROCEDURE — 36415 PR COLLECTION VENOUS BLOOD,VENIPUNCTURE: ICD-10-PCS | Mod: S$GLB,,, | Performed by: FAMILY MEDICINE

## 2020-03-10 PROCEDURE — 99999 PR PBB SHADOW E&M-EST. PATIENT-LVL III: ICD-10-PCS | Mod: PBBFAC,,, | Performed by: FAMILY MEDICINE

## 2020-03-10 PROCEDURE — 80061 LIPID PANEL: CPT

## 2020-03-10 PROCEDURE — 3008F BODY MASS INDEX DOCD: CPT | Mod: CPTII,S$GLB,, | Performed by: FAMILY MEDICINE

## 2020-03-10 PROCEDURE — 3079F PR MOST RECENT DIASTOLIC BLOOD PRESSURE 80-89 MM HG: ICD-10-PCS | Mod: CPTII,S$GLB,, | Performed by: FAMILY MEDICINE

## 2020-03-10 PROCEDURE — 99999 PR PBB SHADOW E&M-EST. PATIENT-LVL III: CPT | Mod: PBBFAC,,, | Performed by: FAMILY MEDICINE

## 2020-03-10 PROCEDURE — 3008F PR BODY MASS INDEX (BMI) DOCUMENTED: ICD-10-PCS | Mod: CPTII,S$GLB,, | Performed by: FAMILY MEDICINE

## 2020-03-10 PROCEDURE — 99214 OFFICE O/P EST MOD 30 MIN: CPT | Mod: S$GLB,,, | Performed by: FAMILY MEDICINE

## 2020-03-10 PROCEDURE — 36415 COLL VENOUS BLD VENIPUNCTURE: CPT | Mod: S$GLB,,, | Performed by: FAMILY MEDICINE

## 2020-03-10 PROCEDURE — 3079F DIAST BP 80-89 MM HG: CPT | Mod: CPTII,S$GLB,, | Performed by: FAMILY MEDICINE

## 2020-03-10 RX ORDER — ESCITALOPRAM OXALATE 10 MG/1
10 TABLET ORAL DAILY
Qty: 90 TABLET | Refills: 1 | Status: SHIPPED | OUTPATIENT
Start: 2020-03-10 | End: 2020-09-21 | Stop reason: SDUPTHER

## 2020-03-10 RX ORDER — CLONAZEPAM 0.5 MG/1
0.5 TABLET ORAL 2 TIMES DAILY PRN
Qty: 60 TABLET | Refills: 0 | Status: SHIPPED | OUTPATIENT
Start: 2020-03-10 | End: 2020-05-18

## 2020-03-10 NOTE — PROGRESS NOTES
Subjective:       Patient ID: Gricelda Gil is a 60 y.o. female.    Chief Complaint: Follow-up and Knee Pain    Pt is a 60 y.o. female who presents for evaluation and management of   Encounter Diagnoses   Name Primary?    Dyslipidemia Yes    Primary osteoarthritis of left knee     Obesity, unspecified classification, unspecified obesity type, unspecified whether serious comorbidity present    .  Doing well on current meds. Denies any side effects. Prevention is up to date.    Review of Systems   Constitutional: Negative for activity change and unexpected weight change.   HENT: Negative for hearing loss, rhinorrhea and trouble swallowing.    Eyes: Negative for discharge and visual disturbance.   Respiratory: Negative for chest tightness and wheezing.    Cardiovascular: Negative for chest pain and palpitations.   Gastrointestinal: Negative for blood in stool, constipation, diarrhea and vomiting.   Endocrine: Negative for polydipsia and polyuria.   Genitourinary: Negative for difficulty urinating, dysuria, hematuria and menstrual problem.   Musculoskeletal: Positive for arthralgias and joint swelling. Negative for neck pain.   Neurological: Positive for headaches. Negative for weakness.   Psychiatric/Behavioral: Positive for dysphoric mood. Negative for confusion.       Objective:      Physical Exam   Constitutional: She is oriented to person, place, and time. She appears well-developed and well-nourished.   Obese    HENT:   Head: Normocephalic and atraumatic.   Right Ear: External ear normal.   Left Ear: External ear normal.   Nose: Nose normal.   Mouth/Throat: Oropharynx is clear and moist.   Eyes: Pupils are equal, round, and reactive to light. EOM are normal.   Neck: Normal range of motion. Neck supple. No JVD present. No tracheal deviation present. No thyromegaly present.   Cardiovascular: Normal rate, normal heart sounds and intact distal pulses.   No murmur heard.  Pulmonary/Chest: Effort normal and  breath sounds normal. No respiratory distress. She has no wheezes. She has no rales. She exhibits no tenderness.   Abdominal: Soft. Bowel sounds are normal. She exhibits no distension and no mass. There is no tenderness. There is no rebound and no guarding.   Musculoskeletal: Normal range of motion. She exhibits no edema or tenderness.   Lymphadenopathy:     She has no cervical adenopathy.   Neurological: She is alert and oriented to person, place, and time. She has normal reflexes. She displays normal reflexes. No cranial nerve deficit. She exhibits normal muscle tone. Coordination normal.   Skin: Skin is warm and dry. No rash noted. No erythema. No pallor.   Psychiatric: She has a normal mood and affect. Her behavior is normal. Judgment and thought content normal.       Assessment:       1. Dyslipidemia    2. Primary osteoarthritis of left knee    3. Obesity, unspecified classification, unspecified obesity type, unspecified whether serious comorbidity present        Plan:   Gricelda was seen today for follow-up and knee pain.    Diagnoses and all orders for this visit:    Dyslipidemia  -     Comprehensive metabolic panel; Future  -     Lipid panel; Future    Primary osteoarthritis of left knee  -     Ambulatory referral/consult to Orthopedics; Future    Obesity, unspecified classification, unspecified obesity type, unspecified whether serious comorbidity present    The patient is asked to make an attempt to improve diet and exercise patterns to aid in medical management of this problem.  5 small meals a day. Cut out white carbs: bread, rice, pasta, potatoes. Exercise/walk 5x/week for at least 30 minutes  .    Problem List Items Addressed This Visit     Dyslipidemia - Primary    Relevant Orders    Comprehensive metabolic panel    Lipid panel      Other Visit Diagnoses     Primary osteoarthritis of left knee        Relevant Orders    Ambulatory referral/consult to Orthopedics    Obesity, unspecified classification,  unspecified obesity type, unspecified whether serious comorbidity present            No follow-ups on file.

## 2020-03-11 DIAGNOSIS — E78.5 DYSLIPIDEMIA: Primary | ICD-10-CM

## 2020-03-11 LAB
ALBUMIN SERPL BCP-MCNC: 3.7 G/DL (ref 3.5–5.2)
ALP SERPL-CCNC: 109 U/L (ref 55–135)
ALT SERPL W/O P-5'-P-CCNC: 27 U/L (ref 10–44)
ANION GAP SERPL CALC-SCNC: 7 MMOL/L (ref 8–16)
AST SERPL-CCNC: 23 U/L (ref 10–40)
BILIRUB SERPL-MCNC: 0.4 MG/DL (ref 0.1–1)
BUN SERPL-MCNC: 12 MG/DL (ref 6–20)
CALCIUM SERPL-MCNC: 9.7 MG/DL (ref 8.7–10.5)
CHLORIDE SERPL-SCNC: 105 MMOL/L (ref 95–110)
CHOLEST SERPL-MCNC: 216 MG/DL (ref 120–199)
CHOLEST/HDLC SERPL: 6 {RATIO} (ref 2–5)
CO2 SERPL-SCNC: 28 MMOL/L (ref 23–29)
CREAT SERPL-MCNC: 1 MG/DL (ref 0.5–1.4)
EST. GFR  (AFRICAN AMERICAN): >60 ML/MIN/1.73 M^2
EST. GFR  (NON AFRICAN AMERICAN): >60 ML/MIN/1.73 M^2
GLUCOSE SERPL-MCNC: 96 MG/DL (ref 70–110)
HDLC SERPL-MCNC: 36 MG/DL (ref 40–75)
HDLC SERPL: 16.7 % (ref 20–50)
LDLC SERPL CALC-MCNC: 139.6 MG/DL (ref 63–159)
NONHDLC SERPL-MCNC: 180 MG/DL
POTASSIUM SERPL-SCNC: 5.2 MMOL/L (ref 3.5–5.1)
PROT SERPL-MCNC: 7.8 G/DL (ref 6–8.4)
SODIUM SERPL-SCNC: 140 MMOL/L (ref 136–145)
TRIGL SERPL-MCNC: 202 MG/DL (ref 30–150)

## 2020-03-11 RX ORDER — ATORVASTATIN CALCIUM 40 MG/1
40 TABLET, FILM COATED ORAL DAILY
Qty: 30 TABLET | Refills: 3 | Status: SHIPPED | OUTPATIENT
Start: 2020-03-11 | End: 2020-05-27

## 2020-03-11 NOTE — PROGRESS NOTES
Patients risk of a heart attack in the next 10 years is 17 % based on her cholesterol. I am Starting lipitor. repeat labs in 3months.     Return to clinic with appt in 3 months

## 2020-03-13 ENCOUNTER — TELEPHONE (OUTPATIENT)
Dept: FAMILY MEDICINE | Facility: CLINIC | Age: 60
End: 2020-03-13

## 2020-03-13 NOTE — TELEPHONE ENCOUNTER
----- Message from Major Kimble MD sent at 3/11/2020 12:00 PM CDT -----  Patients risk of a heart attack in the next 10 years is 17 % based on her cholesterol. I am Starting lipitor. repeat labs in 3months.     Return to clinic with appt in 3 months

## 2020-03-18 NOTE — TELEPHONE ENCOUNTER
Patient notified of lab results and recommendations. 3 month lab and office visit follow up scheduled and mailed to patient.

## 2020-04-28 ENCOUNTER — PATIENT OUTREACH (OUTPATIENT)
Dept: ADMINISTRATIVE | Facility: HOSPITAL | Age: 60
End: 2020-04-28

## 2020-04-28 DIAGNOSIS — Z12.31 ENCOUNTER FOR SCREENING MAMMOGRAM FOR BREAST CANCER: Primary | ICD-10-CM

## 2020-05-18 RX ORDER — CLONAZEPAM 0.5 MG/1
0.5 TABLET ORAL 2 TIMES DAILY PRN
Qty: 60 TABLET | Refills: 0 | Status: SHIPPED | OUTPATIENT
Start: 2020-05-18 | End: 2020-06-21

## 2020-05-27 DIAGNOSIS — E78.5 DYSLIPIDEMIA: ICD-10-CM

## 2020-05-27 RX ORDER — ATORVASTATIN CALCIUM 40 MG/1
TABLET, FILM COATED ORAL
Qty: 90 TABLET | Refills: 1 | Status: SHIPPED | OUTPATIENT
Start: 2020-05-27 | End: 2020-07-23 | Stop reason: SDUPTHER

## 2020-06-20 ENCOUNTER — HOSPITAL ENCOUNTER (OUTPATIENT)
Dept: RADIOLOGY | Facility: HOSPITAL | Age: 60
Discharge: HOME | End: 2020-06-20
Attending: NURSE PRACTITIONER
Payer: MEDICARE

## 2020-06-20 ENCOUNTER — OFFICE VISIT (OUTPATIENT)
Dept: FAMILY MEDICINE | Facility: CLINIC | Age: 60
End: 2020-06-20
Payer: MEDICARE

## 2020-06-20 VITALS
DIASTOLIC BLOOD PRESSURE: 78 MMHG | HEIGHT: 66 IN | HEART RATE: 60 BPM | BODY MASS INDEX: 44.2 KG/M2 | SYSTOLIC BLOOD PRESSURE: 116 MMHG | WEIGHT: 275 LBS | RESPIRATION RATE: 18 BRPM | TEMPERATURE: 97 F

## 2020-06-20 DIAGNOSIS — R50.9 PROLONGED FEVER: Primary | ICD-10-CM

## 2020-06-20 DIAGNOSIS — R50.9 PROLONGED FEVER: ICD-10-CM

## 2020-06-20 PROCEDURE — 81002 POCT URINALYSIS, DIPSTICK OR TABLET REAGENT, AUTOMATED, WITH MICROSCOP: ICD-10-PCS | Mod: S$GLB,,, | Performed by: NURSE PRACTITIONER

## 2020-06-20 PROCEDURE — 99214 PR OFFICE/OUTPT VISIT, EST, LEVL IV, 30-39 MIN: ICD-10-PCS | Mod: S$GLB,25,, | Performed by: NURSE PRACTITIONER

## 2020-06-20 PROCEDURE — 99214 OFFICE O/P EST MOD 30 MIN: CPT | Mod: S$GLB,25,, | Performed by: NURSE PRACTITIONER

## 2020-06-20 PROCEDURE — 99999 PR PBB SHADOW E&M-EST. PATIENT-LVL IV: CPT | Mod: PBBFAC,,, | Performed by: NURSE PRACTITIONER

## 2020-06-20 PROCEDURE — 71046 X-RAY EXAM CHEST 2 VIEWS: CPT | Mod: TC

## 2020-06-20 PROCEDURE — 71046 XR CHEST PA AND LATERAL: ICD-10-PCS | Mod: 26,,, | Performed by: RADIOLOGY

## 2020-06-20 PROCEDURE — 3078F PR MOST RECENT DIASTOLIC BLOOD PRESSURE < 80 MM HG: ICD-10-PCS | Mod: CPTII,S$GLB,, | Performed by: NURSE PRACTITIONER

## 2020-06-20 PROCEDURE — 99999 PR PBB SHADOW E&M-EST. PATIENT-LVL IV: ICD-10-PCS | Mod: PBBFAC,,, | Performed by: NURSE PRACTITIONER

## 2020-06-20 PROCEDURE — 81002 URINALYSIS NONAUTO W/O SCOPE: CPT | Mod: S$GLB,,, | Performed by: NURSE PRACTITIONER

## 2020-06-20 PROCEDURE — 71046 X-RAY EXAM CHEST 2 VIEWS: CPT | Mod: 26,,, | Performed by: RADIOLOGY

## 2020-06-20 PROCEDURE — 3074F PR MOST RECENT SYSTOLIC BLOOD PRESSURE < 130 MM HG: ICD-10-PCS | Mod: CPTII,S$GLB,, | Performed by: NURSE PRACTITIONER

## 2020-06-20 PROCEDURE — 3008F PR BODY MASS INDEX (BMI) DOCUMENTED: ICD-10-PCS | Mod: CPTII,S$GLB,, | Performed by: NURSE PRACTITIONER

## 2020-06-20 PROCEDURE — 3074F SYST BP LT 130 MM HG: CPT | Mod: CPTII,S$GLB,, | Performed by: NURSE PRACTITIONER

## 2020-06-20 PROCEDURE — 3008F BODY MASS INDEX DOCD: CPT | Mod: CPTII,S$GLB,, | Performed by: NURSE PRACTITIONER

## 2020-06-20 PROCEDURE — 3078F DIAST BP <80 MM HG: CPT | Mod: CPTII,S$GLB,, | Performed by: NURSE PRACTITIONER

## 2020-06-20 NOTE — PROGRESS NOTES
Subjective:       Patient ID: Gricelda Gil is a 60 y.o. female.    Chief Complaint: Fever (x 8 days)    Fever with no other symptoms. Up to 101.9. Last fever last PM.    Fever   This is a new problem. The current episode started 1 to 4 weeks ago (x 8 days). The problem occurs daily. The problem has been waxing and waning. The maximum temperature noted was 101 to 101.9 F. The temperature was taken using a tympanic thermometer. Pertinent negatives include no abdominal pain, chest pain, congestion, coughing, diarrhea, ear pain, headaches, muscle aches, nausea, rash, sleepiness, sore throat, urinary pain, vomiting or wheezing. She has tried acetaminophen for the symptoms. The treatment provided no relief.     Review of Systems   Constitutional: Positive for fever. Negative for appetite change and fatigue.   HENT: Negative.  Negative for congestion, ear pain and sore throat.    Eyes: Negative.  Negative for visual disturbance.   Respiratory: Negative.  Negative for cough, shortness of breath and wheezing.    Cardiovascular: Negative.  Negative for chest pain.   Gastrointestinal: Negative.  Negative for abdominal pain, diarrhea, nausea and vomiting.   Endocrine: Negative.    Genitourinary: Negative.  Negative for difficulty urinating, dysuria and urgency.   Musculoskeletal: Negative.  Negative for arthralgias and myalgias.   Skin: Negative.  Negative for color change and rash.   Allergic/Immunologic: Negative.    Neurological: Negative.  Negative for dizziness and headaches.   Hematological: Negative.    Psychiatric/Behavioral: Negative.  Negative for sleep disturbance.   All other systems reviewed and are negative.      Objective:      Physical Exam  Vitals signs and nursing note reviewed.   Constitutional:       Appearance: Normal appearance. She is well-developed.   HENT:      Head: Normocephalic and atraumatic.      Right Ear: Tympanic membrane and external ear normal.      Left Ear: Tympanic membrane and  external ear normal.      Nose: Nose normal.      Mouth/Throat:      Mouth: Mucous membranes are moist.   Eyes:      Conjunctiva/sclera: Conjunctivae normal.      Pupils: Pupils are equal, round, and reactive to light.   Neck:      Musculoskeletal: Normal range of motion and neck supple.      Thyroid: No thyromegaly.   Cardiovascular:      Rate and Rhythm: Normal rate and regular rhythm.      Pulses: Normal pulses.      Heart sounds: Normal heart sounds. No murmur.   Pulmonary:      Effort: Pulmonary effort is normal.      Breath sounds: Normal breath sounds.   Abdominal:      General: Bowel sounds are normal.      Palpations: Abdomen is soft.      Tenderness: There is no abdominal tenderness.   Musculoskeletal: Normal range of motion.   Lymphadenopathy:      Cervical: No cervical adenopathy.   Skin:     General: Skin is warm and dry.      Findings: No rash.   Neurological:      Mental Status: She is alert and oriented to person, place, and time.      Deep Tendon Reflexes: Reflexes are normal and symmetric.   Psychiatric:         Mood and Affect: Mood normal.         Assessment:       1. Prolonged fever        Plan:   Gricelda was seen today for fever.    Diagnoses and all orders for this visit:    Prolonged fever  -     Cancel: POCT URINE SEDIMENT EXAM - unable to do R/T volume  -     POCT urinalysis, dipstick or tablet reag - normal  -     CBC auto differential; Future - normal  -     Comprehensive metabolic panel; Future - normal  -      X-Ray Chest PA And Lateral; Future - normal    Discussed if fever persists and since we can find no source, monitor fever the weekend and call Monday if still with fever may give round of antibiotic    Patient notified or normal results

## 2020-06-22 ENCOUNTER — TELEPHONE (OUTPATIENT)
Dept: FAMILY MEDICINE | Facility: CLINIC | Age: 60
End: 2020-06-22

## 2020-06-22 DIAGNOSIS — M17.12 PRIMARY OSTEOARTHRITIS OF LEFT KNEE: Primary | ICD-10-CM

## 2020-06-22 LAB
BILIRUB SERPL-MCNC: NORMAL MG/DL
BLOOD URINE, POC: NORMAL
COLOR, POC UA: NORMAL
GLUCOSE UR QL STRIP: NORMAL
KETONES UR QL STRIP: NORMAL
LEUKOCYTE ESTERASE URINE, POC: NORMAL
NITRITE, POC UA: NORMAL
PH, POC UA: 5
PROTEIN, POC: NORMAL
SPECIFIC GRAVITY, POC UA: 1.03
UROBILINOGEN, POC UA: NORMAL

## 2020-06-22 NOTE — TELEPHONE ENCOUNTER
----- Message from Yenifer Falcon sent at 2020  2:23 PM CDT -----  Contact: self  Gricelda Gil  MRN: [id number]  : 1960  PCP: Major Kimble  Home Phone      [phone]  Work Phone      Not on file.  Mobile          [phone]      MESSAGE:  Pt states she still has been running a fever off and on and was told to call back if it stayed high.   Phone: [phone]

## 2020-06-22 NOTE — TELEPHONE ENCOUNTER
Pt states she still has been running a fever off and on and was told to call back if it stayed high.pt was seen in clinic on 6/20/20.  Attempted to contact pt to find out more details, no answer.

## 2020-06-24 RX ORDER — DICLOFENAC SODIUM 75 MG/1
TABLET, DELAYED RELEASE ORAL
Qty: 60 TABLET | Refills: 5 | Status: SHIPPED | OUTPATIENT
Start: 2020-06-24 | End: 2020-09-15

## 2020-07-02 ENCOUNTER — CLINICAL SUPPORT (OUTPATIENT)
Dept: FAMILY MEDICINE | Facility: CLINIC | Age: 60
End: 2020-07-02
Payer: MEDICARE

## 2020-07-02 ENCOUNTER — HOSPITAL ENCOUNTER (OUTPATIENT)
Dept: RADIOLOGY | Facility: HOSPITAL | Age: 60
Discharge: HOME | End: 2020-07-02
Attending: ORTHOPAEDIC SURGERY
Payer: MEDICARE

## 2020-07-02 DIAGNOSIS — M25.561 RIGHT KNEE PAIN: ICD-10-CM

## 2020-07-02 DIAGNOSIS — E78.5 DYSLIPIDEMIA: ICD-10-CM

## 2020-07-02 DIAGNOSIS — M25.562 LEFT KNEE PAIN: ICD-10-CM

## 2020-07-02 LAB
ALBUMIN SERPL BCP-MCNC: 3.5 G/DL (ref 3.5–5.2)
ALP SERPL-CCNC: 128 U/L (ref 55–135)
ALT SERPL W/O P-5'-P-CCNC: 17 U/L (ref 10–44)
ANION GAP SERPL CALC-SCNC: 10 MMOL/L (ref 8–16)
AST SERPL-CCNC: 17 U/L (ref 10–40)
BILIRUB SERPL-MCNC: 0.5 MG/DL (ref 0.1–1)
BUN SERPL-MCNC: 22 MG/DL (ref 6–20)
CALCIUM SERPL-MCNC: 9.4 MG/DL (ref 8.7–10.5)
CHLORIDE SERPL-SCNC: 105 MMOL/L (ref 95–110)
CHOLEST SERPL-MCNC: 141 MG/DL (ref 120–199)
CHOLEST/HDLC SERPL: 4.9 {RATIO} (ref 2–5)
CO2 SERPL-SCNC: 27 MMOL/L (ref 23–29)
CREAT SERPL-MCNC: 0.9 MG/DL (ref 0.5–1.4)
EST. GFR  (AFRICAN AMERICAN): >60 ML/MIN/1.73 M^2
EST. GFR  (NON AFRICAN AMERICAN): >60 ML/MIN/1.73 M^2
GLUCOSE SERPL-MCNC: 110 MG/DL (ref 70–110)
HDLC SERPL-MCNC: 29 MG/DL (ref 40–75)
HDLC SERPL: 20.6 % (ref 20–50)
LDLC SERPL CALC-MCNC: 77.2 MG/DL (ref 63–159)
NONHDLC SERPL-MCNC: 112 MG/DL
POTASSIUM SERPL-SCNC: 4.1 MMOL/L (ref 3.5–5.1)
PROT SERPL-MCNC: 7.2 G/DL (ref 6–8.4)
SODIUM SERPL-SCNC: 142 MMOL/L (ref 136–145)
TRIGL SERPL-MCNC: 174 MG/DL (ref 30–150)

## 2020-07-02 PROCEDURE — 36415 COLL VENOUS BLD VENIPUNCTURE: CPT | Mod: S$GLB,,, | Performed by: FAMILY MEDICINE

## 2020-07-02 PROCEDURE — 80053 COMPREHEN METABOLIC PANEL: CPT

## 2020-07-02 PROCEDURE — 80061 LIPID PANEL: CPT

## 2020-07-02 PROCEDURE — 73564 X-RAY EXAM KNEE 4 OR MORE: CPT | Mod: TC,50

## 2020-07-02 PROCEDURE — 36415 PR COLLECTION VENOUS BLOOD,VENIPUNCTURE: ICD-10-PCS | Mod: S$GLB,,, | Performed by: FAMILY MEDICINE

## 2020-07-23 ENCOUNTER — OFFICE VISIT (OUTPATIENT)
Dept: FAMILY MEDICINE | Facility: CLINIC | Age: 60
End: 2020-07-23
Payer: MEDICARE

## 2020-07-23 VITALS
BODY MASS INDEX: 44.19 KG/M2 | HEART RATE: 60 BPM | RESPIRATION RATE: 16 BRPM | TEMPERATURE: 97 F | SYSTOLIC BLOOD PRESSURE: 108 MMHG | HEIGHT: 66 IN | WEIGHT: 274.94 LBS | DIASTOLIC BLOOD PRESSURE: 60 MMHG

## 2020-07-23 DIAGNOSIS — M17.12 PRIMARY OSTEOARTHRITIS OF LEFT KNEE: ICD-10-CM

## 2020-07-23 DIAGNOSIS — E78.5 DYSLIPIDEMIA: ICD-10-CM

## 2020-07-23 PROCEDURE — 3078F DIAST BP <80 MM HG: CPT | Mod: CPTII,S$GLB,, | Performed by: FAMILY MEDICINE

## 2020-07-23 PROCEDURE — 3008F BODY MASS INDEX DOCD: CPT | Mod: CPTII,S$GLB,, | Performed by: FAMILY MEDICINE

## 2020-07-23 PROCEDURE — 3078F PR MOST RECENT DIASTOLIC BLOOD PRESSURE < 80 MM HG: ICD-10-PCS | Mod: CPTII,S$GLB,, | Performed by: FAMILY MEDICINE

## 2020-07-23 PROCEDURE — 3008F PR BODY MASS INDEX (BMI) DOCUMENTED: ICD-10-PCS | Mod: CPTII,S$GLB,, | Performed by: FAMILY MEDICINE

## 2020-07-23 PROCEDURE — 99213 PR OFFICE/OUTPT VISIT, EST, LEVL III, 20-29 MIN: ICD-10-PCS | Mod: S$GLB,,, | Performed by: FAMILY MEDICINE

## 2020-07-23 PROCEDURE — 99999 PR PBB SHADOW E&M-EST. PATIENT-LVL III: CPT | Mod: PBBFAC,,, | Performed by: FAMILY MEDICINE

## 2020-07-23 PROCEDURE — 3074F PR MOST RECENT SYSTOLIC BLOOD PRESSURE < 130 MM HG: ICD-10-PCS | Mod: CPTII,S$GLB,, | Performed by: FAMILY MEDICINE

## 2020-07-23 PROCEDURE — 99999 PR PBB SHADOW E&M-EST. PATIENT-LVL III: ICD-10-PCS | Mod: PBBFAC,,, | Performed by: FAMILY MEDICINE

## 2020-07-23 PROCEDURE — 3074F SYST BP LT 130 MM HG: CPT | Mod: CPTII,S$GLB,, | Performed by: FAMILY MEDICINE

## 2020-07-23 PROCEDURE — 99213 OFFICE O/P EST LOW 20 MIN: CPT | Mod: S$GLB,,, | Performed by: FAMILY MEDICINE

## 2020-07-23 RX ORDER — ATORVASTATIN CALCIUM 40 MG/1
40 TABLET, FILM COATED ORAL DAILY
Qty: 90 TABLET | Refills: 1 | Status: SHIPPED | OUTPATIENT
Start: 2020-07-23 | End: 2021-01-31

## 2020-07-23 NOTE — PROGRESS NOTES
Subjective:       Patient ID: Gricelda Gil is a 60 y.o. female.    Chief Complaint: Check Up and Weight Gain (pt wants to discuss weight loss options)    Pt is a 60 y.o. female who presents for evaluation and management of   Encounter Diagnoses   Name Primary?    Primary osteoarthritis of left knee     Dyslipidemia    .f/u lipitor start   Cholesterol at goal     Doing well on current meds. Denies any side effects. Prevention is up to date.  Review of Systems   Constitutional: Negative for activity change and unexpected weight change.   HENT: Negative for hearing loss, rhinorrhea and trouble swallowing.    Eyes: Negative for discharge and visual disturbance.   Respiratory: Negative for chest tightness and wheezing.    Cardiovascular: Negative for chest pain and palpitations.   Gastrointestinal: Negative for blood in stool, constipation, diarrhea and vomiting.   Endocrine: Negative for polydipsia and polyuria.   Genitourinary: Negative for difficulty urinating, dysuria, hematuria and menstrual problem.   Musculoskeletal: Positive for joint swelling. Negative for arthralgias and neck pain.   Neurological: Negative for weakness and headaches.   Psychiatric/Behavioral: Negative for confusion and dysphoric mood.       Objective:      Physical Exam  Constitutional:       Appearance: She is well-developed.   HENT:      Head: Normocephalic and atraumatic.      Right Ear: External ear normal.      Left Ear: External ear normal.      Nose: Nose normal.   Eyes:      Pupils: Pupils are equal, round, and reactive to light.   Neck:      Musculoskeletal: Normal range of motion and neck supple.      Thyroid: No thyromegaly.      Vascular: No JVD.      Trachea: No tracheal deviation.   Cardiovascular:      Rate and Rhythm: Normal rate.      Heart sounds: Normal heart sounds. No murmur.   Pulmonary:      Effort: Pulmonary effort is normal. No respiratory distress.      Breath sounds: Normal breath sounds. No wheezing or  rales.   Chest:      Chest wall: No tenderness.   Abdominal:      General: Bowel sounds are normal. There is no distension.      Palpations: Abdomen is soft. There is no mass.      Tenderness: There is no abdominal tenderness. There is no guarding or rebound.   Musculoskeletal: Normal range of motion.         General: No tenderness.   Lymphadenopathy:      Cervical: No cervical adenopathy.   Skin:     General: Skin is warm and dry.      Coloration: Skin is not pale.      Findings: No erythema or rash.   Neurological:      Mental Status: She is alert and oriented to person, place, and time.      Cranial Nerves: No cranial nerve deficit.      Motor: No abnormal muscle tone.      Coordination: Coordination normal.      Deep Tendon Reflexes: Reflexes are normal and symmetric. Reflexes normal.   Psychiatric:         Behavior: Behavior normal.         Thought Content: Thought content normal.         Judgment: Judgment normal.         Assessment:       1. Primary osteoarthritis of left knee    2. Dyslipidemia        Plan:   Gricelda was seen today for check up and weight gain.    Diagnoses and all orders for this visit:    Primary osteoarthritis of left knee    Dyslipidemia  -     atorvastatin (LIPITOR) 40 MG tablet; Take 1 tablet (40 mg total) by mouth once daily.      Problem List Items Addressed This Visit     Dyslipidemia    Relevant Medications    atorvastatin (LIPITOR) 40 MG tablet      Other Visit Diagnoses     Primary osteoarthritis of left knee            No follow-ups on file.

## 2020-08-31 DIAGNOSIS — I10 ESSENTIAL HYPERTENSION: ICD-10-CM

## 2020-08-31 RX ORDER — CLONAZEPAM 0.5 MG/1
0.5 TABLET ORAL 2 TIMES DAILY PRN
Qty: 60 TABLET | Refills: 0 | Status: SHIPPED | OUTPATIENT
Start: 2020-08-31 | End: 2020-10-14 | Stop reason: SDUPTHER

## 2020-08-31 RX ORDER — AMLODIPINE AND BENAZEPRIL HYDROCHLORIDE 5; 40 MG/1; MG/1
1 CAPSULE ORAL DAILY
Qty: 90 CAPSULE | Refills: 1 | Status: SHIPPED | OUTPATIENT
Start: 2020-08-31 | End: 2021-01-31

## 2020-08-31 NOTE — TELEPHONE ENCOUNTER
----- Message from Mahogany Giles sent at 2020  3:47 PM CDT -----  Regarding: Med refill  Contact: [facility]  Gricelda Gil  MRN: [id number]  : 1960  PCP: Major Kimble  Home Phone      [phone]  Work Phone      Not on file.  Mobile          [phone]      MESSAGE:   Pt requesting refill or new Rx.   Is this a refill or new RX:  refill  RX name and strength: amlodipine-benazepril (LOTREL) 5-40 mg per capsule  Last office visit: 2020  Is this a 30-day or 90-day RX:  30-Day  Pharmacy name and location:  [facility] [place]  Comments:      Phone:  [phone]

## 2020-08-31 NOTE — TELEPHONE ENCOUNTER
----- Message from Leodan Walters sent at 2020 10:38 AM CDT -----  Contact: [facility]  Gricelda Gil  MRN: [id number]  : 1960  PCP: Major Kimble  Home Phone      [phone]  Work Phone      Not on file.  Mobile          [phone]      MESSAGE:   Pt requesting refill or new Rx.   Is this a refill or new RX:  refill  RX name and strength: Klonopin 0.5 mg  Last office visit: 20  Is this a 30-day or 90-day RX:  30 day  Pharmacy name and location:  [facility] [place]  Comments:      Phone:  [facility] - [phone]    PCP: Lamin

## 2020-09-02 ENCOUNTER — APPOINTMENT (OUTPATIENT)
Dept: RADIOLOGY | Facility: CLINIC | Age: 60
End: 2020-09-02
Attending: NURSE PRACTITIONER
Payer: MEDICARE

## 2020-09-02 ENCOUNTER — OFFICE VISIT (OUTPATIENT)
Dept: FAMILY MEDICINE | Facility: CLINIC | Age: 60
End: 2020-09-02
Payer: MEDICARE

## 2020-09-02 VITALS
TEMPERATURE: 98 F | WEIGHT: 280 LBS | HEART RATE: 88 BPM | HEIGHT: 66 IN | DIASTOLIC BLOOD PRESSURE: 70 MMHG | RESPIRATION RATE: 18 BRPM | BODY MASS INDEX: 45 KG/M2 | SYSTOLIC BLOOD PRESSURE: 120 MMHG

## 2020-09-02 DIAGNOSIS — M25.511 ACUTE PAIN OF RIGHT SHOULDER: Primary | ICD-10-CM

## 2020-09-02 DIAGNOSIS — M25.511 ACUTE PAIN OF RIGHT SHOULDER: ICD-10-CM

## 2020-09-02 PROCEDURE — 73030 X-RAY EXAM OF SHOULDER: CPT | Mod: TC,PO,RT

## 2020-09-02 PROCEDURE — 96372 PR INJECTION,THERAP/PROPH/DIAG2ST, IM OR SUBCUT: ICD-10-PCS | Mod: S$GLB,,, | Performed by: NURSE PRACTITIONER

## 2020-09-02 PROCEDURE — 99213 PR OFFICE/OUTPT VISIT, EST, LEVL III, 20-29 MIN: ICD-10-PCS | Mod: 25,S$GLB,, | Performed by: NURSE PRACTITIONER

## 2020-09-02 PROCEDURE — 96372 THER/PROPH/DIAG INJ SC/IM: CPT | Mod: S$GLB,,, | Performed by: NURSE PRACTITIONER

## 2020-09-02 PROCEDURE — 99213 OFFICE O/P EST LOW 20 MIN: CPT | Mod: 25,S$GLB,, | Performed by: NURSE PRACTITIONER

## 2020-09-02 PROCEDURE — 3008F BODY MASS INDEX DOCD: CPT | Mod: CPTII,S$GLB,, | Performed by: NURSE PRACTITIONER

## 2020-09-02 PROCEDURE — 3078F DIAST BP <80 MM HG: CPT | Mod: CPTII,S$GLB,, | Performed by: NURSE PRACTITIONER

## 2020-09-02 PROCEDURE — 99999 PR PBB SHADOW E&M-EST. PATIENT-LVL IV: ICD-10-PCS | Mod: PBBFAC,,, | Performed by: NURSE PRACTITIONER

## 2020-09-02 PROCEDURE — 3074F SYST BP LT 130 MM HG: CPT | Mod: CPTII,S$GLB,, | Performed by: NURSE PRACTITIONER

## 2020-09-02 PROCEDURE — 3078F PR MOST RECENT DIASTOLIC BLOOD PRESSURE < 80 MM HG: ICD-10-PCS | Mod: CPTII,S$GLB,, | Performed by: NURSE PRACTITIONER

## 2020-09-02 PROCEDURE — 3008F PR BODY MASS INDEX (BMI) DOCUMENTED: ICD-10-PCS | Mod: CPTII,S$GLB,, | Performed by: NURSE PRACTITIONER

## 2020-09-02 PROCEDURE — 99999 PR PBB SHADOW E&M-EST. PATIENT-LVL IV: CPT | Mod: PBBFAC,,, | Performed by: NURSE PRACTITIONER

## 2020-09-02 PROCEDURE — 3074F PR MOST RECENT SYSTOLIC BLOOD PRESSURE < 130 MM HG: ICD-10-PCS | Mod: CPTII,S$GLB,, | Performed by: NURSE PRACTITIONER

## 2020-09-02 RX ORDER — MELOXICAM 15 MG/1
15 TABLET ORAL DAILY
Qty: 30 TABLET | Refills: 5 | Status: SHIPPED | OUTPATIENT
Start: 2020-09-02 | End: 2021-09-14

## 2020-09-02 RX ORDER — KETOROLAC TROMETHAMINE 30 MG/ML
60 INJECTION, SOLUTION INTRAMUSCULAR; INTRAVENOUS
Status: COMPLETED | OUTPATIENT
Start: 2020-09-02 | End: 2020-09-02

## 2020-09-02 RX ORDER — METHYLPREDNISOLONE ACETATE 40 MG/ML
60 INJECTION, SUSPENSION INTRA-ARTICULAR; INTRALESIONAL; INTRAMUSCULAR; SOFT TISSUE
Status: COMPLETED | OUTPATIENT
Start: 2020-09-02 | End: 2020-09-02

## 2020-09-02 RX ORDER — CYCLOBENZAPRINE HCL 10 MG
10 TABLET ORAL NIGHTLY PRN
Qty: 30 TABLET | Refills: 5 | Status: SHIPPED | OUTPATIENT
Start: 2020-09-02 | End: 2021-09-14

## 2020-09-02 RX ADMIN — METHYLPREDNISOLONE ACETATE 60 MG: 40 INJECTION, SUSPENSION INTRA-ARTICULAR; INTRALESIONAL; INTRAMUSCULAR; SOFT TISSUE at 04:09

## 2020-09-02 RX ADMIN — KETOROLAC TROMETHAMINE 60 MG: 30 INJECTION, SOLUTION INTRAMUSCULAR; INTRAVENOUS at 04:09

## 2020-09-02 NOTE — PROGRESS NOTES
Subjective:       Patient ID: Gricelda Gil is a 60 y.o. female.    Chief Complaint: Shoulder Pain (R shoulder )    Shoulder Pain   The pain is present in the right shoulder. This is a new problem. The current episode started 1 to 4 weeks ago. There has been a history of trauma. The problem occurs intermittently. The quality of the pain is described as aching. Associated symptoms include a limited range of motion and stiffness. Pertinent negatives include no headaches. She has tried NSAIDS for the symptoms.     Review of Systems   Constitutional: Negative.  Negative for activity change and unexpected weight change.   HENT: Negative.  Negative for hearing loss, rhinorrhea and trouble swallowing.    Eyes: Negative.  Negative for discharge and visual disturbance.   Respiratory: Negative.  Negative for chest tightness and wheezing.    Cardiovascular: Negative.  Negative for chest pain and palpitations.   Gastrointestinal: Negative.  Negative for blood in stool, constipation, diarrhea and vomiting.   Endocrine: Negative.  Negative for polydipsia and polyuria.   Genitourinary: Negative.  Negative for difficulty urinating, dysuria, hematuria and menstrual problem.   Musculoskeletal: Positive for arthralgias, joint swelling, neck pain and stiffness.        Right shoulder pain   Skin: Negative.    Neurological: Negative.  Negative for weakness and headaches.   Psychiatric/Behavioral: Negative.  Negative for confusion and dysphoric mood.   All other systems reviewed and are negative.      Objective:      Physical Exam  Vitals signs and nursing note reviewed.   Constitutional:       General: She is not in acute distress.     Appearance: Normal appearance. She is well-developed.   HENT:      Head: Normocephalic and atraumatic.   Eyes:      Pupils: Pupils are equal, round, and reactive to light.   Neck:      Musculoskeletal: Normal range of motion and neck supple.   Cardiovascular:      Rate and Rhythm: Normal rate and  regular rhythm.      Pulses: Normal pulses.      Heart sounds: Normal heart sounds. No murmur.   Pulmonary:      Effort: Pulmonary effort is normal. No respiratory distress.      Breath sounds: Normal breath sounds.   Musculoskeletal:      Comments: Slightly limited ROM   Skin:     General: Skin is warm and dry.   Neurological:      Mental Status: She is alert and oriented to person, place, and time.   Psychiatric:         Mood and Affect: Mood normal.         Assessment:       1. Acute pain of right shoulder        Plan:   Gricelda was seen today for shoulder pain.    Diagnoses and all orders for this visit:    Acute pain of right shoulder  -     X-ray Shoulder 2 or More Views Right; Future  -     methylPREDNISolone acetate injection 60 mg  -     ketorolac injection 60 mg  -     meloxicam (MOBIC) 15 MG tablet; Take 1 tablet (15 mg total) by mouth once daily.  -     cyclobenzaprine (FLEXERIL) 10 MG tablet; Take 1 tablet (10 mg total) by mouth nightly as needed for Muscle spasms.    RTC PRN unresolving symptoms

## 2020-09-15 ENCOUNTER — OFFICE VISIT (OUTPATIENT)
Dept: FAMILY MEDICINE | Facility: CLINIC | Age: 60
End: 2020-09-15
Payer: MEDICARE

## 2020-09-15 VITALS
HEART RATE: 88 BPM | BODY MASS INDEX: 43.86 KG/M2 | RESPIRATION RATE: 18 BRPM | DIASTOLIC BLOOD PRESSURE: 80 MMHG | SYSTOLIC BLOOD PRESSURE: 122 MMHG | HEIGHT: 66 IN | WEIGHT: 272.94 LBS | TEMPERATURE: 97 F

## 2020-09-15 DIAGNOSIS — M25.511 ACUTE PAIN OF RIGHT SHOULDER: Primary | ICD-10-CM

## 2020-09-15 PROCEDURE — 99999 PR PBB SHADOW E&M-EST. PATIENT-LVL III: ICD-10-PCS | Mod: PBBFAC,,, | Performed by: FAMILY MEDICINE

## 2020-09-15 PROCEDURE — 3074F SYST BP LT 130 MM HG: CPT | Mod: CPTII,S$GLB,, | Performed by: FAMILY MEDICINE

## 2020-09-15 PROCEDURE — 3008F BODY MASS INDEX DOCD: CPT | Mod: CPTII,S$GLB,, | Performed by: FAMILY MEDICINE

## 2020-09-15 PROCEDURE — 99213 OFFICE O/P EST LOW 20 MIN: CPT | Mod: S$GLB,,, | Performed by: FAMILY MEDICINE

## 2020-09-15 PROCEDURE — 3079F PR MOST RECENT DIASTOLIC BLOOD PRESSURE 80-89 MM HG: ICD-10-PCS | Mod: CPTII,S$GLB,, | Performed by: FAMILY MEDICINE

## 2020-09-15 PROCEDURE — 99999 PR PBB SHADOW E&M-EST. PATIENT-LVL III: CPT | Mod: PBBFAC,,, | Performed by: FAMILY MEDICINE

## 2020-09-15 PROCEDURE — 3074F PR MOST RECENT SYSTOLIC BLOOD PRESSURE < 130 MM HG: ICD-10-PCS | Mod: CPTII,S$GLB,, | Performed by: FAMILY MEDICINE

## 2020-09-15 PROCEDURE — 3079F DIAST BP 80-89 MM HG: CPT | Mod: CPTII,S$GLB,, | Performed by: FAMILY MEDICINE

## 2020-09-15 PROCEDURE — 3008F PR BODY MASS INDEX (BMI) DOCUMENTED: ICD-10-PCS | Mod: CPTII,S$GLB,, | Performed by: FAMILY MEDICINE

## 2020-09-15 PROCEDURE — 99213 PR OFFICE/OUTPT VISIT, EST, LEVL III, 20-29 MIN: ICD-10-PCS | Mod: S$GLB,,, | Performed by: FAMILY MEDICINE

## 2020-09-15 NOTE — PROGRESS NOTES
Subjective:       Patient ID: Gricelda Gil is a 60 y.o. female.    Chief Complaint: Follow-up (R shoulder )    Pt is a 60 y.o. female who presents for evaluation and management of   Encounter Diagnosis   Name Primary?    Acute pain of right shoulder Yes   fell 4 weeks ago   xrays negative 2 weeks later   She still has pain on the distal clavicle     Doing well on current meds. Denies any side effects. Prevention is up to date.    Review of Systems   Constitutional: Negative for activity change and unexpected weight change.   HENT: Negative for hearing loss, rhinorrhea and trouble swallowing.    Eyes: Negative for discharge and visual disturbance.   Respiratory: Negative for chest tightness and wheezing.    Cardiovascular: Negative for chest pain and palpitations.   Gastrointestinal: Negative for blood in stool, constipation, diarrhea and vomiting.   Endocrine: Negative for polydipsia and polyuria.   Genitourinary: Negative for difficulty urinating, dysuria, hematuria and menstrual problem.   Musculoskeletal: Positive for arthralgias, joint swelling and neck pain.   Neurological: Negative for weakness and headaches.   Psychiatric/Behavioral: Negative for confusion and dysphoric mood.       Objective:      Physical Exam  Constitutional:       Appearance: She is well-developed.   HENT:      Head: Normocephalic and atraumatic.      Right Ear: External ear normal.      Left Ear: External ear normal.      Nose: Nose normal.   Eyes:      Pupils: Pupils are equal, round, and reactive to light.   Neck:      Musculoskeletal: Normal range of motion and neck supple.      Thyroid: No thyromegaly.      Vascular: No JVD.      Trachea: No tracheal deviation.   Cardiovascular:      Rate and Rhythm: Normal rate.      Heart sounds: Normal heart sounds. No murmur.   Pulmonary:      Effort: Pulmonary effort is normal. No respiratory distress.      Breath sounds: Normal breath sounds. No wheezing or rales.   Chest:      Chest  wall: No tenderness.   Abdominal:      General: Bowel sounds are normal. There is no distension.      Palpations: Abdomen is soft. There is no mass.      Tenderness: There is no abdominal tenderness. There is no guarding or rebound.   Musculoskeletal: Normal range of motion.         General: No tenderness.      Comments: Negative impingement signs 1 and 2  5/5 supraspinatous   5/5 subscapularis  5/5 teres minor and infraspinatous  Neg tejada  No pain with AC loading   Some pain with palpation distal clavicle      Lymphadenopathy:      Cervical: No cervical adenopathy.   Skin:     General: Skin is warm and dry.      Coloration: Skin is not pale.      Findings: No erythema or rash.   Neurological:      Mental Status: She is alert and oriented to person, place, and time.      Cranial Nerves: No cranial nerve deficit.      Motor: No abnormal muscle tone.      Coordination: Coordination normal.      Deep Tendon Reflexes: Reflexes are normal and symmetric. Reflexes normal.   Psychiatric:         Behavior: Behavior normal.         Thought Content: Thought content normal.         Judgment: Judgment normal.         Assessment:       1. Acute pain of right shoulder        Plan:   Gricelda was seen today for follow-up.    Diagnoses and all orders for this visit:    Acute pain of right shoulder      Problem List Items Addressed This Visit     None      Visit Diagnoses     Acute pain of right shoulder    -  Primary        Exam is normal except for some distal clavicle TTP. No fx on Xray   I dont see need for MRI since her exam is normal. Even if she has a clavicle fx not seen on xray, her mngt wouldn't change   mobic prn   voltaren gel prn     RTC if condition acutely worsens or any other concerns, otherwise RTC as scheduled    No follow-ups on file.

## 2020-09-21 DIAGNOSIS — F41.0 PANIC ATTACKS: ICD-10-CM

## 2020-09-21 RX ORDER — ESCITALOPRAM OXALATE 10 MG/1
10 TABLET ORAL DAILY
Qty: 90 TABLET | Refills: 1 | Status: SHIPPED | OUTPATIENT
Start: 2020-09-21 | End: 2021-03-08

## 2020-09-21 NOTE — TELEPHONE ENCOUNTER
----- Message from Yenifer Falcon sent at 2020  3:49 PM CDT -----  Contact: fax  Gricelda Gil  MRN: [id number]  : 1960  PCP: Major Kimble  Home Phone      [phone]  Work Phone      Not on file.  Mobile          [phone]      MESSAGE:   Pt requesting refill or new Rx.   Is this a refill or new RX:  new  RX name and strength: escitalopram oxalate (LEXAPRO) 10 MG tablet  Last office visit: 09/15/2020  Is this a 30-day or 90-day RX:  30  [facility] [place] [facility] [address]  Comments:      Phone:  [phone]

## 2020-10-05 ENCOUNTER — PATIENT MESSAGE (OUTPATIENT)
Dept: ADMINISTRATIVE | Facility: HOSPITAL | Age: 60
End: 2020-10-05

## 2020-10-14 ENCOUNTER — OFFICE VISIT (OUTPATIENT)
Dept: FAMILY MEDICINE | Facility: CLINIC | Age: 60
End: 2020-10-14
Payer: MEDICARE

## 2020-10-14 ENCOUNTER — APPOINTMENT (OUTPATIENT)
Dept: RADIOLOGY | Facility: CLINIC | Age: 60
End: 2020-10-14
Attending: FAMILY MEDICINE
Payer: MEDICARE

## 2020-10-14 VITALS
SYSTOLIC BLOOD PRESSURE: 128 MMHG | HEIGHT: 66 IN | DIASTOLIC BLOOD PRESSURE: 86 MMHG | BODY MASS INDEX: 44.22 KG/M2 | HEART RATE: 92 BPM | RESPIRATION RATE: 16 BRPM | WEIGHT: 275.13 LBS | OXYGEN SATURATION: 97 %

## 2020-10-14 DIAGNOSIS — F41.1 GAD (GENERALIZED ANXIETY DISORDER): ICD-10-CM

## 2020-10-14 DIAGNOSIS — Z01.818 PRE-OP EVALUATION: Primary | ICD-10-CM

## 2020-10-14 DIAGNOSIS — Z01.818 PRE-OP EVALUATION: ICD-10-CM

## 2020-10-14 LAB
ANION GAP SERPL CALC-SCNC: 10 MMOL/L (ref 8–16)
BACTERIA SPEC CULT: NORMAL
BASOPHILS # BLD AUTO: 0.12 K/UL (ref 0–0.2)
BASOPHILS NFR BLD: 1.2 % (ref 0–1.9)
BILIRUB SERPL-MCNC: NORMAL MG/DL
BLOOD URINE, POC: NORMAL
BUN SERPL-MCNC: 14 MG/DL (ref 6–20)
CALCIUM SERPL-MCNC: 9.7 MG/DL (ref 8.7–10.5)
CASTS: NORMAL
CHLORIDE SERPL-SCNC: 102 MMOL/L (ref 95–110)
CO2 SERPL-SCNC: 28 MMOL/L (ref 23–29)
COLOR, POC UA: NORMAL
CREAT SERPL-MCNC: 1.1 MG/DL (ref 0.5–1.4)
CRYSTALS: NORMAL
DIFFERENTIAL METHOD: ABNORMAL
EOSINOPHIL # BLD AUTO: 0.3 K/UL (ref 0–0.5)
EOSINOPHIL NFR BLD: 2.5 % (ref 0–8)
ERYTHROCYTE [DISTWIDTH] IN BLOOD BY AUTOMATED COUNT: 13.8 % (ref 11.5–14.5)
EST. GFR  (AFRICAN AMERICAN): >60 ML/MIN/1.73 M^2
EST. GFR  (NON AFRICAN AMERICAN): 55 ML/MIN/1.73 M^2
GLUCOSE SERPL-MCNC: 114 MG/DL (ref 70–110)
GLUCOSE UR QL STRIP: NORMAL
HCT VFR BLD AUTO: 45 % (ref 37–48.5)
HGB BLD-MCNC: 14 G/DL (ref 12–16)
IMM GRANULOCYTES # BLD AUTO: 0.06 K/UL (ref 0–0.04)
IMM GRANULOCYTES NFR BLD AUTO: 0.6 % (ref 0–0.5)
KETONES UR QL STRIP: NORMAL
LEUKOCYTE ESTERASE URINE, POC: NORMAL
LYMPHOCYTES # BLD AUTO: 2.5 K/UL (ref 1–4.8)
LYMPHOCYTES NFR BLD: 25.2 % (ref 18–48)
MCH RBC QN AUTO: 28.5 PG (ref 27–31)
MCHC RBC AUTO-ENTMCNC: 31.1 G/DL (ref 32–36)
MCV RBC AUTO: 92 FL (ref 82–98)
MONOCYTES # BLD AUTO: 1.1 K/UL (ref 0.3–1)
MONOCYTES NFR BLD: 11.4 % (ref 4–15)
NEUTROPHILS # BLD AUTO: 5.9 K/UL (ref 1.8–7.7)
NEUTROPHILS NFR BLD: 59.1 % (ref 38–73)
NITRITE, POC UA: NORMAL
NRBC BLD-RTO: 0 /100 WBC
PH, POC UA: NORMAL
PLATELET # BLD AUTO: 330 K/UL (ref 150–350)
PMV BLD AUTO: 10.6 FL (ref 9.2–12.9)
POTASSIUM SERPL-SCNC: 4 MMOL/L (ref 3.5–5.1)
PROTEIN, POC: NORMAL
RBC # BLD AUTO: 4.92 M/UL (ref 4–5.4)
RBC CELLS COUNTED: NORMAL
SODIUM SERPL-SCNC: 140 MMOL/L (ref 136–145)
SPECIFIC GRAVITY, POC UA: NORMAL
UROBILINOGEN, POC UA: NORMAL
WBC # BLD AUTO: 9.91 K/UL (ref 3.9–12.7)
WHITE BLOOD CELLS: NORMAL

## 2020-10-14 PROCEDURE — 99999 PR PBB SHADOW E&M-EST. PATIENT-LVL IV: CPT | Mod: PBBFAC,,, | Performed by: FAMILY MEDICINE

## 2020-10-14 PROCEDURE — 85025 COMPLETE CBC W/AUTO DIFF WBC: CPT

## 2020-10-14 PROCEDURE — 3008F PR BODY MASS INDEX (BMI) DOCUMENTED: ICD-10-PCS | Mod: CPTII,S$GLB,, | Performed by: FAMILY MEDICINE

## 2020-10-14 PROCEDURE — 80048 BASIC METABOLIC PNL TOTAL CA: CPT

## 2020-10-14 PROCEDURE — 71046 X-RAY EXAM CHEST 2 VIEWS: CPT | Mod: TC,PO

## 2020-10-14 PROCEDURE — 99499 RISK ADDL DX/OHS AUDIT: ICD-10-PCS | Mod: S$GLB,,, | Performed by: FAMILY MEDICINE

## 2020-10-14 PROCEDURE — 36415 PR COLLECTION VENOUS BLOOD,VENIPUNCTURE: ICD-10-PCS | Mod: S$GLB,,, | Performed by: FAMILY MEDICINE

## 2020-10-14 PROCEDURE — 81000 POCT URINE SEDIMENT EXAM: ICD-10-PCS | Mod: S$GLB,,, | Performed by: FAMILY MEDICINE

## 2020-10-14 PROCEDURE — 36415 COLL VENOUS BLD VENIPUNCTURE: CPT | Mod: S$GLB,,, | Performed by: FAMILY MEDICINE

## 2020-10-14 PROCEDURE — 3008F BODY MASS INDEX DOCD: CPT | Mod: CPTII,S$GLB,, | Performed by: FAMILY MEDICINE

## 2020-10-14 PROCEDURE — 99214 PR OFFICE/OUTPT VISIT, EST, LEVL IV, 30-39 MIN: ICD-10-PCS | Mod: 25,S$GLB,, | Performed by: FAMILY MEDICINE

## 2020-10-14 PROCEDURE — 99999 PR PBB SHADOW E&M-EST. PATIENT-LVL IV: ICD-10-PCS | Mod: PBBFAC,,, | Performed by: FAMILY MEDICINE

## 2020-10-14 PROCEDURE — 99214 OFFICE O/P EST MOD 30 MIN: CPT | Mod: 25,S$GLB,, | Performed by: FAMILY MEDICINE

## 2020-10-14 PROCEDURE — 81000 URINALYSIS NONAUTO W/SCOPE: CPT | Mod: S$GLB,,, | Performed by: FAMILY MEDICINE

## 2020-10-14 PROCEDURE — 3074F SYST BP LT 130 MM HG: CPT | Mod: CPTII,S$GLB,, | Performed by: FAMILY MEDICINE

## 2020-10-14 PROCEDURE — 3074F PR MOST RECENT SYSTOLIC BLOOD PRESSURE < 130 MM HG: ICD-10-PCS | Mod: CPTII,S$GLB,, | Performed by: FAMILY MEDICINE

## 2020-10-14 PROCEDURE — 3079F DIAST BP 80-89 MM HG: CPT | Mod: CPTII,S$GLB,, | Performed by: FAMILY MEDICINE

## 2020-10-14 PROCEDURE — 3079F PR MOST RECENT DIASTOLIC BLOOD PRESSURE 80-89 MM HG: ICD-10-PCS | Mod: CPTII,S$GLB,, | Performed by: FAMILY MEDICINE

## 2020-10-14 PROCEDURE — 99499 UNLISTED E&M SERVICE: CPT | Mod: S$GLB,,, | Performed by: FAMILY MEDICINE

## 2020-10-14 RX ORDER — CLONAZEPAM 0.5 MG/1
0.5 TABLET ORAL 2 TIMES DAILY PRN
Qty: 60 TABLET | Refills: 2 | Status: SHIPPED | OUTPATIENT
Start: 2020-10-14 | End: 2021-02-01

## 2020-10-14 NOTE — PROGRESS NOTES
Subjective:       Patient ID: Gricelda Gil is a 60 y.o. female.    Chief Complaint: Pre-op Exam    Pt is a 60 y.o. female who presents for evaluation and management of   Encounter Diagnoses   Name Primary?    Pre-op evaluation Yes    SHERIE (generalized anxiety disorder)    .TKA with Arias  Not yet scheduled     Doing well on current meds. Denies any side effects. Prevention is up to date.    Review of Systems   Constitutional: Negative for activity change and unexpected weight change.   HENT: Negative for hearing loss, rhinorrhea and trouble swallowing.    Eyes: Negative for discharge and visual disturbance.   Respiratory: Negative for chest tightness and wheezing.    Cardiovascular: Negative for chest pain and palpitations.   Gastrointestinal: Negative for blood in stool, constipation, diarrhea and vomiting.   Endocrine: Negative for polydipsia and polyuria.   Genitourinary: Negative for difficulty urinating, dysuria, hematuria and menstrual problem.   Musculoskeletal: Positive for arthralgias and joint swelling. Negative for neck pain.   Neurological: Negative for weakness and headaches.   Psychiatric/Behavioral: Negative for confusion and dysphoric mood.       Objective:      Physical Exam  Constitutional:       Appearance: She is well-developed.   HENT:      Head: Normocephalic and atraumatic.      Right Ear: External ear normal.      Left Ear: External ear normal.      Nose: Nose normal.   Eyes:      Pupils: Pupils are equal, round, and reactive to light.   Neck:      Musculoskeletal: Normal range of motion and neck supple.      Thyroid: No thyromegaly.      Vascular: No JVD.      Trachea: No tracheal deviation.   Cardiovascular:      Rate and Rhythm: Normal rate.      Heart sounds: Normal heart sounds. No murmur.   Pulmonary:      Effort: Pulmonary effort is normal. No respiratory distress.      Breath sounds: Normal breath sounds. No wheezing or rales.   Chest:      Chest wall: No tenderness.    Abdominal:      General: Bowel sounds are normal. There is no distension.      Palpations: Abdomen is soft. There is no mass.      Tenderness: There is no abdominal tenderness. There is no guarding or rebound.   Musculoskeletal: Normal range of motion.         General: No tenderness.   Lymphadenopathy:      Cervical: No cervical adenopathy.   Skin:     General: Skin is warm and dry.      Coloration: Skin is not pale.      Findings: No erythema or rash.   Neurological:      Mental Status: She is alert and oriented to person, place, and time.      Cranial Nerves: No cranial nerve deficit.      Motor: No abnormal muscle tone.      Coordination: Coordination normal.      Deep Tendon Reflexes: Reflexes are normal and symmetric. Reflexes normal.   Psychiatric:         Behavior: Behavior normal.         Thought Content: Thought content normal.         Judgment: Judgment normal.         Assessment:       1. Pre-op evaluation    2. SHERIE (generalized anxiety disorder)        Plan:   Gricelda was seen today for pre-op exam.    Diagnoses and all orders for this visit:    Pre-op evaluation  -     CBC auto differential; Future  -     Basic Metabolic Panel; Future  -     POCT urinalysis, dipstick or tablet reag  -     POCT URINE SEDIMENT EXAM  -     X-Ray Chest PA And Lateral; Future    SHERIE (generalized anxiety disorder)  -     clonazePAM (KLONOPIN) 0.5 MG tablet; Take 1 tablet (0.5 mg total) by mouth 2 (two) times daily as needed for Anxiety.      Problem List Items Addressed This Visit     None      Visit Diagnoses     Pre-op evaluation    -  Primary    Relevant Orders    CBC auto differential    Basic Metabolic Panel    POCT urinalysis, dipstick or tablet reag    POCT URINE SEDIMENT EXAM    X-Ray Chest PA And Lateral    SHERIE (generalized anxiety disorder)        Relevant Medications    clonazePAM (KLONOPIN) 0.5 MG tablet        No follow-ups on file.

## 2020-10-15 ENCOUNTER — PATIENT MESSAGE (OUTPATIENT)
Dept: FAMILY MEDICINE | Facility: CLINIC | Age: 60
End: 2020-10-15

## 2020-10-27 ENCOUNTER — TELEPHONE (OUTPATIENT)
Dept: FAMILY MEDICINE | Facility: CLINIC | Age: 60
End: 2020-10-27

## 2020-10-27 NOTE — TELEPHONE ENCOUNTER
----- Message from Leodan Walters sent at 10/27/2020 11:42 AM CDT -----  Contact: Patient  Gricelda Gil  MRN: [id number]  : 1960  PCP: Major Kimble  Home Phone      [phone]  Work Phone      Not on file.  Mobile          [phone]      MESSAGE:  patient states Dr Arias did not receive her clearance -- please advise    Call [phone]    PCP: Lamin

## 2020-10-30 ENCOUNTER — PATIENT MESSAGE (OUTPATIENT)
Dept: ADMINISTRATIVE | Facility: HOSPITAL | Age: 60
End: 2020-10-30

## 2020-11-30 RX ORDER — QUETIAPINE FUMARATE 400 MG/1
200 TABLET, FILM COATED ORAL NIGHTLY
Qty: 90 TABLET | Refills: 3 | Status: SHIPPED | OUTPATIENT
Start: 2020-11-30 | End: 2022-01-27

## 2020-11-30 NOTE — TELEPHONE ENCOUNTER
----- Message from Yenifer Falcon sent at 2020  4:53 PM CST -----  Contact: fax  Gricelda Gil  MRN: [id number]  : 1960  PCP: Major Kimble  Home Phone      [phone]  Work Phone      Not on file.  Mobile          [phone]      MESSAGE:   Pt requesting refill or new Rx.   Is this a refill or new RX:  refill  RX name and strength: QUEtiapine (SEROQUEL) 400 MG tablet  Last office visit: 10/14/2020  Is this a 30-day or 90-day RX:  90  [facility] [place] [facility] [address]  Comments:      Phone:  [phone]

## 2020-12-13 RX ORDER — QUETIAPINE FUMARATE 400 MG/1
200 TABLET, FILM COATED ORAL NIGHTLY
Qty: 90 TABLET | Refills: 3 | Status: CANCELLED | OUTPATIENT
Start: 2020-12-13

## 2020-12-14 NOTE — TELEPHONE ENCOUNTER
Received Rx request for Seroquel, last prescription was sent on 11/30/2020 #90 with 3 refills. Notified patient of the available refills and to call her pharmacy to have it refill if she Is out.

## 2021-01-04 ENCOUNTER — PATIENT MESSAGE (OUTPATIENT)
Dept: ADMINISTRATIVE | Facility: HOSPITAL | Age: 61
End: 2021-01-04

## 2021-03-17 ENCOUNTER — OFFICE VISIT (OUTPATIENT)
Dept: FAMILY MEDICINE | Facility: CLINIC | Age: 61
End: 2021-03-17
Payer: MEDICARE

## 2021-03-17 VITALS
WEIGHT: 287.25 LBS | HEIGHT: 66 IN | BODY MASS INDEX: 46.16 KG/M2 | HEART RATE: 88 BPM | SYSTOLIC BLOOD PRESSURE: 128 MMHG | RESPIRATION RATE: 24 BRPM | TEMPERATURE: 97 F | DIASTOLIC BLOOD PRESSURE: 98 MMHG

## 2021-03-17 DIAGNOSIS — H69.92 EUSTACHIAN TUBE DYSFUNCTION, LEFT: ICD-10-CM

## 2021-03-17 DIAGNOSIS — I10 ESSENTIAL HYPERTENSION: Primary | ICD-10-CM

## 2021-03-17 DIAGNOSIS — H93.8X2 EAR FULLNESS, LEFT: ICD-10-CM

## 2021-03-17 DIAGNOSIS — G47.00 INSOMNIA, UNSPECIFIED TYPE: ICD-10-CM

## 2021-03-17 DIAGNOSIS — E03.9 HYPOTHYROIDISM, UNSPECIFIED TYPE: ICD-10-CM

## 2021-03-17 DIAGNOSIS — E78.5 DYSLIPIDEMIA: ICD-10-CM

## 2021-03-17 PROCEDURE — 99999 PR PBB SHADOW E&M-EST. PATIENT-LVL IV: ICD-10-PCS | Mod: PBBFAC,,, | Performed by: FAMILY MEDICINE

## 2021-03-17 PROCEDURE — 3080F PR MOST RECENT DIASTOLIC BLOOD PRESSURE >= 90 MM HG: ICD-10-PCS | Mod: CPTII,S$GLB,, | Performed by: FAMILY MEDICINE

## 2021-03-17 PROCEDURE — 99214 PR OFFICE/OUTPT VISIT, EST, LEVL IV, 30-39 MIN: ICD-10-PCS | Mod: S$GLB,,, | Performed by: FAMILY MEDICINE

## 2021-03-17 PROCEDURE — 3080F DIAST BP >= 90 MM HG: CPT | Mod: CPTII,S$GLB,, | Performed by: FAMILY MEDICINE

## 2021-03-17 PROCEDURE — 99999 PR PBB SHADOW E&M-EST. PATIENT-LVL IV: CPT | Mod: PBBFAC,,, | Performed by: FAMILY MEDICINE

## 2021-03-17 PROCEDURE — 1125F PR PAIN SEVERITY QUANTIFIED, PAIN PRESENT: ICD-10-PCS | Mod: S$GLB,,, | Performed by: FAMILY MEDICINE

## 2021-03-17 PROCEDURE — 99499 UNLISTED E&M SERVICE: CPT | Mod: S$GLB,,, | Performed by: FAMILY MEDICINE

## 2021-03-17 PROCEDURE — 3008F BODY MASS INDEX DOCD: CPT | Mod: CPTII,S$GLB,, | Performed by: FAMILY MEDICINE

## 2021-03-17 PROCEDURE — 3008F PR BODY MASS INDEX (BMI) DOCUMENTED: ICD-10-PCS | Mod: CPTII,S$GLB,, | Performed by: FAMILY MEDICINE

## 2021-03-17 PROCEDURE — 3074F PR MOST RECENT SYSTOLIC BLOOD PRESSURE < 130 MM HG: ICD-10-PCS | Mod: CPTII,S$GLB,, | Performed by: FAMILY MEDICINE

## 2021-03-17 PROCEDURE — 99214 OFFICE O/P EST MOD 30 MIN: CPT | Mod: S$GLB,,, | Performed by: FAMILY MEDICINE

## 2021-03-17 PROCEDURE — 99499 RISK ADDL DX/OHS AUDIT: ICD-10-PCS | Mod: S$GLB,,, | Performed by: FAMILY MEDICINE

## 2021-03-17 PROCEDURE — 3074F SYST BP LT 130 MM HG: CPT | Mod: CPTII,S$GLB,, | Performed by: FAMILY MEDICINE

## 2021-03-17 PROCEDURE — 1125F AMNT PAIN NOTED PAIN PRSNT: CPT | Mod: S$GLB,,, | Performed by: FAMILY MEDICINE

## 2021-03-17 RX ORDER — METHYLPREDNISOLONE 4 MG/1
TABLET ORAL
Qty: 1 PACKAGE | Refills: 0 | Status: SHIPPED | OUTPATIENT
Start: 2021-03-17 | End: 2021-04-07

## 2021-03-17 RX ORDER — NEOMYCIN SULFATE, POLYMYXIN B SULFATE AND HYDROCORTISONE 10; 3.5; 1 MG/ML; MG/ML; [USP'U]/ML
4 SUSPENSION/ DROPS AURICULAR (OTIC) 3 TIMES DAILY
COMMUNITY
Start: 2021-02-24 | End: 2021-09-14

## 2021-03-17 RX ORDER — NORTRIPTYLINE HYDROCHLORIDE 25 MG/1
25 CAPSULE ORAL NIGHTLY
Qty: 30 CAPSULE | Refills: 5 | Status: SHIPPED | OUTPATIENT
Start: 2021-03-17 | End: 2021-09-14

## 2021-03-17 RX ORDER — CELECOXIB 200 MG/1
200 CAPSULE ORAL 2 TIMES DAILY
COMMUNITY
Start: 2021-02-10 | End: 2021-09-14

## 2021-03-24 ENCOUNTER — CLINICAL SUPPORT (OUTPATIENT)
Dept: FAMILY MEDICINE | Facility: CLINIC | Age: 61
End: 2021-03-24
Payer: MEDICARE

## 2021-03-24 DIAGNOSIS — I10 ESSENTIAL HYPERTENSION: ICD-10-CM

## 2021-03-24 DIAGNOSIS — E78.5 DYSLIPIDEMIA: ICD-10-CM

## 2021-03-24 DIAGNOSIS — E03.9 HYPOTHYROIDISM, UNSPECIFIED TYPE: ICD-10-CM

## 2021-03-24 LAB
ALBUMIN SERPL BCP-MCNC: 3.4 G/DL (ref 3.5–5.2)
ALP SERPL-CCNC: 112 U/L (ref 55–135)
ALT SERPL W/O P-5'-P-CCNC: 15 U/L (ref 10–44)
ANION GAP SERPL CALC-SCNC: 10 MMOL/L (ref 8–16)
AST SERPL-CCNC: 11 U/L (ref 10–40)
BILIRUB SERPL-MCNC: 0.7 MG/DL (ref 0.1–1)
BUN SERPL-MCNC: 28 MG/DL (ref 6–20)
CALCIUM SERPL-MCNC: 9 MG/DL (ref 8.7–10.5)
CHLORIDE SERPL-SCNC: 104 MMOL/L (ref 95–110)
CO2 SERPL-SCNC: 27 MMOL/L (ref 23–29)
CREAT SERPL-MCNC: 1 MG/DL (ref 0.5–1.4)
EST. GFR  (AFRICAN AMERICAN): >60 ML/MIN/1.73 M^2
EST. GFR  (NON AFRICAN AMERICAN): >60 ML/MIN/1.73 M^2
GLUCOSE SERPL-MCNC: 100 MG/DL (ref 70–110)
POTASSIUM SERPL-SCNC: 3.8 MMOL/L (ref 3.5–5.1)
PROT SERPL-MCNC: 6.7 G/DL (ref 6–8.4)
SODIUM SERPL-SCNC: 141 MMOL/L (ref 136–145)
T4 FREE SERPL-MCNC: 0.88 NG/DL (ref 0.71–1.51)
TSH SERPL DL<=0.005 MIU/L-ACNC: 7.56 UIU/ML (ref 0.4–4)

## 2021-03-24 PROCEDURE — 84439 ASSAY OF FREE THYROXINE: CPT | Performed by: FAMILY MEDICINE

## 2021-03-24 PROCEDURE — 36415 PR COLLECTION VENOUS BLOOD,VENIPUNCTURE: ICD-10-PCS | Mod: S$GLB,,, | Performed by: FAMILY MEDICINE

## 2021-03-24 PROCEDURE — 84443 ASSAY THYROID STIM HORMONE: CPT | Performed by: FAMILY MEDICINE

## 2021-03-24 PROCEDURE — 80053 COMPREHEN METABOLIC PANEL: CPT | Performed by: FAMILY MEDICINE

## 2021-03-24 PROCEDURE — 36415 COLL VENOUS BLD VENIPUNCTURE: CPT | Mod: S$GLB,,, | Performed by: FAMILY MEDICINE

## 2021-03-25 DIAGNOSIS — E03.4 HYPOTHYROIDISM DUE TO ACQUIRED ATROPHY OF THYROID: ICD-10-CM

## 2021-03-25 RX ORDER — LEVOTHYROXINE SODIUM 150 UG/1
TABLET ORAL
Qty: 30 TABLET | Refills: 1 | Status: SHIPPED | OUTPATIENT
Start: 2021-03-25 | End: 2021-04-21

## 2021-04-05 ENCOUNTER — PATIENT MESSAGE (OUTPATIENT)
Dept: ADMINISTRATIVE | Facility: HOSPITAL | Age: 61
End: 2021-04-05

## 2021-04-29 ENCOUNTER — PATIENT MESSAGE (OUTPATIENT)
Dept: FAMILY MEDICINE | Facility: CLINIC | Age: 61
End: 2021-04-29

## 2021-04-30 RX ORDER — HYDROXYZINE HYDROCHLORIDE 25 MG/1
25 TABLET, FILM COATED ORAL NIGHTLY PRN
Qty: 30 TABLET | Refills: 5 | Status: SHIPPED | OUTPATIENT
Start: 2021-04-30 | End: 2021-05-30

## 2021-05-06 ENCOUNTER — PATIENT MESSAGE (OUTPATIENT)
Dept: RESEARCH | Facility: HOSPITAL | Age: 61
End: 2021-05-06

## 2021-05-25 ENCOUNTER — PATIENT MESSAGE (OUTPATIENT)
Dept: FAMILY MEDICINE | Facility: CLINIC | Age: 61
End: 2021-05-25

## 2021-05-25 DIAGNOSIS — E66.01 MORBID OBESITY: Primary | ICD-10-CM

## 2021-08-11 ENCOUNTER — PATIENT MESSAGE (OUTPATIENT)
Dept: ADMINISTRATIVE | Facility: OTHER | Age: 61
End: 2021-08-11

## 2021-08-24 ENCOUNTER — TELEPHONE (OUTPATIENT)
Dept: FAMILY MEDICINE | Facility: CLINIC | Age: 61
End: 2021-08-24

## 2021-08-24 DIAGNOSIS — R42 VERTIGO: Primary | ICD-10-CM

## 2021-09-14 DIAGNOSIS — F41.1 GAD (GENERALIZED ANXIETY DISORDER): ICD-10-CM

## 2021-09-14 RX ORDER — CLONAZEPAM 0.5 MG/1
0.5 TABLET ORAL 2 TIMES DAILY
Qty: 60 TABLET | Refills: 0 | Status: SHIPPED | OUTPATIENT
Start: 2021-09-14 | End: 2021-10-20

## 2021-10-28 ENCOUNTER — OFFICE VISIT (OUTPATIENT)
Dept: FAMILY MEDICINE | Facility: CLINIC | Age: 61
End: 2021-10-28
Payer: MEDICARE

## 2021-10-28 ENCOUNTER — APPOINTMENT (OUTPATIENT)
Dept: RADIOLOGY | Facility: CLINIC | Age: 61
End: 2021-10-28
Attending: FAMILY MEDICINE
Payer: MEDICARE

## 2021-10-28 ENCOUNTER — CLINICAL SUPPORT (OUTPATIENT)
Dept: FAMILY MEDICINE | Facility: CLINIC | Age: 61
End: 2021-10-28
Payer: MEDICARE

## 2021-10-28 VITALS
OXYGEN SATURATION: 97 % | SYSTOLIC BLOOD PRESSURE: 140 MMHG | HEIGHT: 66 IN | BODY MASS INDEX: 46.82 KG/M2 | DIASTOLIC BLOOD PRESSURE: 88 MMHG | TEMPERATURE: 97 F | HEART RATE: 90 BPM | RESPIRATION RATE: 20 BRPM | WEIGHT: 291.31 LBS

## 2021-10-28 DIAGNOSIS — R05.9 COUGH: ICD-10-CM

## 2021-10-28 DIAGNOSIS — R06.02 SOB (SHORTNESS OF BREATH): ICD-10-CM

## 2021-10-28 DIAGNOSIS — E78.5 DYSLIPIDEMIA: ICD-10-CM

## 2021-10-28 DIAGNOSIS — I10 PRIMARY HYPERTENSION: ICD-10-CM

## 2021-10-28 DIAGNOSIS — R05.9 COUGH: Primary | ICD-10-CM

## 2021-10-28 DIAGNOSIS — J40 BRONCHITIS: ICD-10-CM

## 2021-10-28 DIAGNOSIS — E03.9 HYPOTHYROIDISM, UNSPECIFIED TYPE: ICD-10-CM

## 2021-10-28 DIAGNOSIS — E66.01 MORBID OBESITY DUE TO EXCESS CALORIES: ICD-10-CM

## 2021-10-28 LAB
ALBUMIN SERPL BCP-MCNC: 3.6 G/DL (ref 3.5–5.2)
ALP SERPL-CCNC: 112 U/L (ref 55–135)
ALT SERPL W/O P-5'-P-CCNC: 18 U/L (ref 10–44)
ANION GAP SERPL CALC-SCNC: 12 MMOL/L (ref 8–16)
AST SERPL-CCNC: 15 U/L (ref 10–40)
BASOPHILS # BLD AUTO: 0.12 K/UL (ref 0–0.2)
BASOPHILS NFR BLD: 1.3 % (ref 0–1.9)
BILIRUB SERPL-MCNC: 0.9 MG/DL (ref 0.1–1)
BUN SERPL-MCNC: 11 MG/DL (ref 6–20)
CALCIUM SERPL-MCNC: 9.7 MG/DL (ref 8.7–10.5)
CHLORIDE SERPL-SCNC: 106 MMOL/L (ref 95–110)
CHOLEST SERPL-MCNC: 148 MG/DL (ref 120–199)
CHOLEST/HDLC SERPL: 4.4 {RATIO} (ref 2–5)
CO2 SERPL-SCNC: 23 MMOL/L (ref 23–29)
CREAT SERPL-MCNC: 0.8 MG/DL (ref 0.5–1.4)
CTP QC/QA: YES
DIFFERENTIAL METHOD: ABNORMAL
EOSINOPHIL # BLD AUTO: 0.3 K/UL (ref 0–0.5)
EOSINOPHIL NFR BLD: 3.7 % (ref 0–8)
ERYTHROCYTE [DISTWIDTH] IN BLOOD BY AUTOMATED COUNT: 15 % (ref 11.5–14.5)
EST. GFR  (AFRICAN AMERICAN): >60 ML/MIN/1.73 M^2
EST. GFR  (NON AFRICAN AMERICAN): >60 ML/MIN/1.73 M^2
GLUCOSE SERPL-MCNC: 120 MG/DL (ref 70–110)
HCT VFR BLD AUTO: 43.7 % (ref 37–48.5)
HDLC SERPL-MCNC: 34 MG/DL (ref 40–75)
HDLC SERPL: 23 % (ref 20–50)
HGB BLD-MCNC: 13.5 G/DL (ref 12–16)
IMM GRANULOCYTES # BLD AUTO: 0.07 K/UL (ref 0–0.04)
IMM GRANULOCYTES NFR BLD AUTO: 0.8 % (ref 0–0.5)
LDLC SERPL CALC-MCNC: 85.2 MG/DL (ref 63–159)
LYMPHOCYTES # BLD AUTO: 2.6 K/UL (ref 1–4.8)
LYMPHOCYTES NFR BLD: 28.2 % (ref 18–48)
MCH RBC QN AUTO: 27.6 PG (ref 27–31)
MCHC RBC AUTO-ENTMCNC: 30.9 G/DL (ref 32–36)
MCV RBC AUTO: 89 FL (ref 82–98)
MONOCYTES # BLD AUTO: 1 K/UL (ref 0.3–1)
MONOCYTES NFR BLD: 11.5 % (ref 4–15)
NEUTROPHILS # BLD AUTO: 4.9 K/UL (ref 1.8–7.7)
NEUTROPHILS NFR BLD: 54.5 % (ref 38–73)
NONHDLC SERPL-MCNC: 114 MG/DL
NRBC BLD-RTO: 0 /100 WBC
PLATELET # BLD AUTO: 306 K/UL (ref 150–450)
PMV BLD AUTO: 10.5 FL (ref 9.2–12.9)
POTASSIUM SERPL-SCNC: 3.8 MMOL/L (ref 3.5–5.1)
PROT SERPL-MCNC: 7.4 G/DL (ref 6–8.4)
RBC # BLD AUTO: 4.9 M/UL (ref 4–5.4)
SARS-COV-2 RDRP RESP QL NAA+PROBE: NEGATIVE
SODIUM SERPL-SCNC: 141 MMOL/L (ref 136–145)
TRIGL SERPL-MCNC: 144 MG/DL (ref 30–150)
TSH SERPL DL<=0.005 MIU/L-ACNC: 2.23 UIU/ML (ref 0.4–4)
WBC # BLD AUTO: 9.04 K/UL (ref 3.9–12.7)

## 2021-10-28 PROCEDURE — 3008F PR BODY MASS INDEX (BMI) DOCUMENTED: ICD-10-PCS | Mod: CPTII,S$GLB,, | Performed by: FAMILY MEDICINE

## 2021-10-28 PROCEDURE — 99499 UNLISTED E&M SERVICE: CPT | Mod: S$GLB,,, | Performed by: FAMILY MEDICINE

## 2021-10-28 PROCEDURE — 3077F PR MOST RECENT SYSTOLIC BLOOD PRESSURE >= 140 MM HG: ICD-10-PCS | Mod: CPTII,S$GLB,, | Performed by: FAMILY MEDICINE

## 2021-10-28 PROCEDURE — 99499 RISK ADDL DX/OHS AUDIT: ICD-10-PCS | Mod: S$GLB,,, | Performed by: FAMILY MEDICINE

## 2021-10-28 PROCEDURE — 96372 THER/PROPH/DIAG INJ SC/IM: CPT | Mod: S$GLB,,, | Performed by: FAMILY MEDICINE

## 2021-10-28 PROCEDURE — 3079F DIAST BP 80-89 MM HG: CPT | Mod: CPTII,S$GLB,, | Performed by: FAMILY MEDICINE

## 2021-10-28 PROCEDURE — 3008F BODY MASS INDEX DOCD: CPT | Mod: CPTII,S$GLB,, | Performed by: FAMILY MEDICINE

## 2021-10-28 PROCEDURE — 85025 COMPLETE CBC W/AUTO DIFF WBC: CPT | Performed by: FAMILY MEDICINE

## 2021-10-28 PROCEDURE — 1159F PR MEDICATION LIST DOCUMENTED IN MEDICAL RECORD: ICD-10-PCS | Mod: CPTII,S$GLB,, | Performed by: FAMILY MEDICINE

## 2021-10-28 PROCEDURE — 80061 LIPID PANEL: CPT | Performed by: FAMILY MEDICINE

## 2021-10-28 PROCEDURE — 99999 PR PBB SHADOW E&M-EST. PATIENT-LVL IV: CPT | Mod: PBBFAC,,, | Performed by: FAMILY MEDICINE

## 2021-10-28 PROCEDURE — 71046 XR CHEST PA AND LATERAL: ICD-10-PCS | Mod: 26,,, | Performed by: RADIOLOGY

## 2021-10-28 PROCEDURE — 3079F PR MOST RECENT DIASTOLIC BLOOD PRESSURE 80-89 MM HG: ICD-10-PCS | Mod: CPTII,S$GLB,, | Performed by: FAMILY MEDICINE

## 2021-10-28 PROCEDURE — 96372 PR INJECTION,THERAP/PROPH/DIAG2ST, IM OR SUBCUT: ICD-10-PCS | Mod: S$GLB,,, | Performed by: FAMILY MEDICINE

## 2021-10-28 PROCEDURE — 36415 COLL VENOUS BLD VENIPUNCTURE: CPT | Mod: S$GLB,,, | Performed by: FAMILY MEDICINE

## 2021-10-28 PROCEDURE — 99999 PR PBB SHADOW E&M-EST. PATIENT-LVL IV: ICD-10-PCS | Mod: PBBFAC,,, | Performed by: FAMILY MEDICINE

## 2021-10-28 PROCEDURE — 1160F PR REVIEW ALL MEDS BY PRESCRIBER/CLIN PHARMACIST DOCUMENTED: ICD-10-PCS | Mod: CPTII,S$GLB,, | Performed by: FAMILY MEDICINE

## 2021-10-28 PROCEDURE — 36415 PR COLLECTION VENOUS BLOOD,VENIPUNCTURE: ICD-10-PCS | Mod: S$GLB,,, | Performed by: FAMILY MEDICINE

## 2021-10-28 PROCEDURE — 99214 PR OFFICE/OUTPT VISIT, EST, LEVL IV, 30-39 MIN: ICD-10-PCS | Mod: 25,S$GLB,, | Performed by: FAMILY MEDICINE

## 2021-10-28 PROCEDURE — 71046 X-RAY EXAM CHEST 2 VIEWS: CPT | Mod: 26,,, | Performed by: RADIOLOGY

## 2021-10-28 PROCEDURE — 1159F MED LIST DOCD IN RCRD: CPT | Mod: CPTII,S$GLB,, | Performed by: FAMILY MEDICINE

## 2021-10-28 PROCEDURE — 4010F PR ACE/ARB THEARPY RXD/TAKEN: ICD-10-PCS | Mod: CPTII,S$GLB,, | Performed by: FAMILY MEDICINE

## 2021-10-28 PROCEDURE — U0002: ICD-10-PCS | Mod: QW,S$GLB,, | Performed by: FAMILY MEDICINE

## 2021-10-28 PROCEDURE — 71046 X-RAY EXAM CHEST 2 VIEWS: CPT | Mod: TC,PO

## 2021-10-28 PROCEDURE — 84443 ASSAY THYROID STIM HORMONE: CPT | Performed by: FAMILY MEDICINE

## 2021-10-28 PROCEDURE — 1160F RVW MEDS BY RX/DR IN RCRD: CPT | Mod: CPTII,S$GLB,, | Performed by: FAMILY MEDICINE

## 2021-10-28 PROCEDURE — 99214 OFFICE O/P EST MOD 30 MIN: CPT | Mod: 25,S$GLB,, | Performed by: FAMILY MEDICINE

## 2021-10-28 PROCEDURE — U0002 COVID-19 LAB TEST NON-CDC: HCPCS | Mod: QW,S$GLB,, | Performed by: FAMILY MEDICINE

## 2021-10-28 PROCEDURE — 3077F SYST BP >= 140 MM HG: CPT | Mod: CPTII,S$GLB,, | Performed by: FAMILY MEDICINE

## 2021-10-28 PROCEDURE — 4010F ACE/ARB THERAPY RXD/TAKEN: CPT | Mod: CPTII,S$GLB,, | Performed by: FAMILY MEDICINE

## 2021-10-28 PROCEDURE — 80053 COMPREHEN METABOLIC PANEL: CPT | Performed by: FAMILY MEDICINE

## 2021-10-28 RX ORDER — TRIAMCINOLONE ACETONIDE 40 MG/ML
60 INJECTION, SUSPENSION INTRA-ARTICULAR; INTRAMUSCULAR
Status: COMPLETED | OUTPATIENT
Start: 2021-10-28 | End: 2021-10-28

## 2021-10-28 RX ORDER — AZITHROMYCIN 500 MG/1
500 TABLET, FILM COATED ORAL DAILY
Qty: 3 TABLET | Refills: 0 | Status: SHIPPED | OUTPATIENT
Start: 2021-10-28 | End: 2021-10-31

## 2021-10-28 RX ADMIN — TRIAMCINOLONE ACETONIDE 60 MG: 40 INJECTION, SUSPENSION INTRA-ARTICULAR; INTRAMUSCULAR at 12:10

## 2021-11-15 ENCOUNTER — TELEPHONE (OUTPATIENT)
Dept: FAMILY MEDICINE | Facility: CLINIC | Age: 61
End: 2021-11-15
Payer: MEDICARE

## 2021-11-16 RX ORDER — AZITHROMYCIN 500 MG/1
500 TABLET, FILM COATED ORAL DAILY
Qty: 3 TABLET | Refills: 0 | Status: SHIPPED | OUTPATIENT
Start: 2021-11-16 | End: 2021-11-19

## 2022-01-05 DIAGNOSIS — F41.1 GAD (GENERALIZED ANXIETY DISORDER): ICD-10-CM

## 2022-01-05 DIAGNOSIS — I10 ESSENTIAL HYPERTENSION: ICD-10-CM

## 2022-01-05 DIAGNOSIS — E78.5 DYSLIPIDEMIA: ICD-10-CM

## 2022-01-06 NOTE — TELEPHONE ENCOUNTER
No new care gaps identified.  Powered by [employer]. Reference number: [id number].   1/05/2022 9:13:07 PM CST

## 2022-01-07 RX ORDER — ATORVASTATIN CALCIUM 40 MG/1
TABLET, FILM COATED ORAL
Qty: 90 TABLET | Refills: 3 | Status: SHIPPED | OUTPATIENT
Start: 2022-01-07 | End: 2023-01-23 | Stop reason: SDUPTHER

## 2022-01-07 NOTE — TELEPHONE ENCOUNTER
Refill Routing Note   Medication(s) are not appropriate for processing by [facility] for the following reason(s):      - Outside of protocol  - Required vitals are abnormal    [facility] action(s):  Defer  Route  Approve       Medication Therapy Plan: BP elevated; Defer amLODIPine-benazepriL; Route clonazePAM; Approve atorvastatin  --->Care Gap information included in message below if applicable.   Medication reconciliation completed: No   Automatic Epic Generated Protocol Data:    Orders Placed This Encounter    atorvastatin (LIPITOR) 40 MG tablet      Requested Prescriptions   Pending Prescriptions Disp Refills    amLODIPine-benazepriL (LOTREL) 5-40 mg per capsule [Pharmacy Med Name: AMLODIPINE-BENAZEPRIL 5-40 MG] 90 capsule 3     Sig: TAKE 1 CAPSULE BY MOUTH EVERY DAY       Cardiovascular: CCB + ACEI Combos Failed - 1/5/2022  9:12 PM        Failed - Last BP in normal range within 360 days     BP Readings from Last 1 Encounters:   10/28/21 (!) 140/88               Passed - Patient is at least 18 years old        Passed - Valid encounter within last 15 months     Recent Visits  Date Type Provider Dept   10/28/21 Office Visit Major Kimble MD [facility]   03/17/21 Office Visit Major Kimble MD [facility]   10/14/20 Office Visit Major Kimble MD [facility]   09/15/20 Office Visit Major Kimble MD [facility]   07/23/20 Office Visit Major Kimble MD [facility]   03/10/20 Office Visit Major Kimble MD [facility]   Showing recent visits within past 720 days and meeting all other requirements  Future Appointments  No visits were found meeting these conditions.  Showing future appointments within next 150 days and meeting all other requirements                Passed - Cr is 1.39 or below and within 360 days     Lab Results   Component Value Date    CREATININE 0.8 10/28/2021    CREATININE 1.0 03/24/2021    CREATININE 1.1  10/14/2020              Passed - K is 5.2 or below and within 360 days     Potassium   Date Value Ref Range Status   10/28/2021 3.8 3.5 - 5.1 mmol/L Final   03/24/2021 3.8 3.5 - 5.1 mmol/L Final   10/14/2020 4.0 3.5 - 5.1 mmol/L Final              Passed - eGFR within 360 days     Lab Results   Component Value Date    EGFRNONAA >60 10/28/2021    EGFRNONAA >60 03/24/2021    EGFRNONAA 55 (A) 10/14/2020                  clonazePAM (KLONOPIN) 0.5 MG tablet [Pharmacy Med Name: CLONAZEPAM 0.5 MG TABLET] 60 tablet 0     Sig: TAKE 1 TABLET BY MOUTH TWICE A DAY       Psychiatry: Anxiolytics/Hypnotics Failed - 1/5/2022  9:12 PM        Failed - This refill cannot be delegated        Passed - Valid encounter within last 6 months     Recent Visits  Date Type Provider Dept   10/28/21 Office Visit Major Kimble MD [facility]   03/17/21 Office Visit Major Kimble MD [facility]   10/14/20 Office Visit Major Kimble MD [facility]   09/15/20 Office Visit Major Kimble MD [facility]   07/23/20 Office Visit Major Kimble MD [facility]   03/10/20 Office Visit Major Kimble MD [facility]   Showing recent visits within past 720 days and meeting all other requirements  Future Appointments  No visits were found meeting these conditions.  Showing future appointments within next 150 days and meeting all other requirements                Passed - Patient is less than 65 years old        Passed - Manual Review: Verify controlled substance agreement completed in the last 12 months       Anticonvulsants Protocol Passed - 1/5/2022  9:12 PM        Passed - Visit with Authorizing provider in past 9 months or upcoming 90 days        Passed - No active pregnancy on record         Signed Prescriptions Disp Refills    atorvastatin (LIPITOR) 40 MG tablet 90 tablet 3     Sig: TAKE 1 TABLET BY MOUTH EVERY DAY       Cardiovascular:  Antilipid - Statins Passed -  1/5/2022  9:12 PM        Passed - Patient is at least 18 years old        Passed - Valid encounter within last 15 months     Recent Visits  Date Type Provider Dept   10/28/21 Office Visit Major Kimble MD [facility]   03/17/21 Office Visit Major Kimble MD [facility]   10/14/20 Office Visit Major Kimble MD [facility]   09/15/20 Office Visit Major Kimble MD [facility]   07/23/20 Office Visit Major Kimble MD [facility]   03/10/20 Office Visit Major Kimble MD [facility]   Showing recent visits within past 720 days and meeting all other requirements  Future Appointments  No visits were found meeting these conditions.  Showing future appointments within next 150 days and meeting all other requirements                Passed - ALT is 131 or below and within 360 days     ALT   Date Value Ref Range Status   10/28/2021 18 10 - 44 U/L Final   03/24/2021 15 10 - 44 U/L Final   07/02/2020 17 10 - 44 U/L Final              Passed - AST is 119 or below and within 360 days     AST   Date Value Ref Range Status   10/28/2021 15 10 - 40 U/L Final   03/24/2021 11 10 - 40 U/L Final   07/02/2020 17 10 - 40 U/L Final              Passed - Total Cholesterol within 360 days     Lab Results   Component Value Date    CHOL 148 10/28/2021    CHOL 141 07/02/2020    CHOL 216 (H) 03/10/2020              Passed - LDL within 360 days     LDL Cholesterol   Date Value Ref Range Status   10/28/2021 85.2 63.0 - 159.0 mg/dL Final     Comment:     The National Cholesterol Education Program (NCEP) has set the  following guidelines (reference values) for LDL Cholesterol:  Optimal.......................<130 mg/dL  Borderline High...............130-159 mg/dL  High..........................160-189 mg/dL  Very High.....................>190 mg/dL              Passed - HDL within 360 days     HDL   Date Value Ref Range Status   10/28/2021 34 (L) 40 - 75 mg/dL Final      Comment:     The National Cholesterol Education Program (NCEP) has set the  following guidelines (reference values) for HDL Cholesterol:  Low...............<40 mg/dL  Optimal...........>60 mg/dL              Passed - Triglycerides within 360 days     Lab Results   Component Value Date    TRIG 144 10/28/2021    TRIG 174 (H) 07/02/2020    TRIG 202 (H) 03/10/2020                    Appointments  past 12m or future 3m with PCP    Date Provider   Last Visit   10/28/2021 Major Kimble MD   Next Visit   Visit date not found Major Kimble MD   ED visits in past 90 days: 0        Note composed:2:16 PM 01/07/2022

## 2022-01-08 RX ORDER — AMLODIPINE AND BENAZEPRIL HYDROCHLORIDE 5; 40 MG/1; MG/1
CAPSULE ORAL
Qty: 90 CAPSULE | Refills: 3 | Status: SHIPPED | OUTPATIENT
Start: 2022-01-08 | End: 2023-01-23 | Stop reason: SDUPTHER

## 2022-01-08 RX ORDER — CLONAZEPAM 0.5 MG/1
TABLET ORAL
Qty: 60 TABLET | Refills: 0 | Status: SHIPPED | OUTPATIENT
Start: 2022-01-08 | End: 2022-03-28

## 2022-01-25 DIAGNOSIS — F41.0 PANIC ATTACKS: ICD-10-CM

## 2022-01-26 RX ORDER — ESCITALOPRAM OXALATE 10 MG/1
TABLET ORAL
Qty: 90 TABLET | Refills: 2 | Status: SHIPPED | OUTPATIENT
Start: 2022-01-26 | End: 2022-05-04

## 2022-01-26 NOTE — TELEPHONE ENCOUNTER
No new care gaps identified.  Powered by [employer]. Reference number: [id number].   1/25/2022 9:46:33 PM CST

## 2022-01-27 RX ORDER — QUETIAPINE FUMARATE 400 MG/1
TABLET, FILM COATED ORAL
Qty: 45 TABLET | Refills: 7 | Status: SHIPPED | OUTPATIENT
Start: 2022-01-27 | End: 2023-01-23 | Stop reason: SDUPTHER

## 2022-01-27 NOTE — TELEPHONE ENCOUNTER
Refill Routing Note   Medication(s) are not appropriate for processing by [facility] for the following reason(s):      - Outside of protocol    [facility] action(s):  Route  Approve       Medication Therapy Plan: Route QUEtiapine; Approve EScitalopram   --->Care Gap information included in message below if applicable.   Medication reconciliation completed: No   Automatic Epic Generated Protocol Data:    Orders Placed This Encounter    EScitalopram oxalate (LEXAPRO) 10 MG tablet      Requested Prescriptions   Pending Prescriptions Disp Refills    QUEtiapine (SEROQUEL) 400 MG tablet [Pharmacy Med Name: QUETIAPINE FUMARATE 400 MG TAB] 45 tablet 7     Sig: TAKE ONE-HALF TABLET BY MOUTH NIGHTLY.       Psychiatry: Antipsychotics - Second Generation (Atypical) - quetiapine Failed - 1/25/2022  9:46 PM        Failed - This refill cannot be delegated        Failed - Glucose (serum) in normal range and within 360 days     Glucose   Date Value Ref Range Status   10/28/2021 120 (H) 70 - 110 mg/dL Final     Glucose, Fasting   Date Value Ref Range Status   05/25/2016 101 70 - 110 mg/dL Final            Passed - Valid encounter within last 6 months     Recent Visits  Date Type Provider Dept   10/28/21 Office Visit Major Kimble MD [facility]   03/17/21 Office Visit Major Kimble MD [facility]   10/14/20 Office Visit Major Kimble MD [facility]   09/15/20 Office Visit Major Kimble MD [facility]   07/23/20 Office Visit Major Kimble MD [facility]   03/10/20 Office Visit Major Kimble MD [facility]   Showing recent visits within past 720 days and meeting all other requirements  Future Appointments  No visits were found meeting these conditions.  Showing future appointments within next 150 days and meeting all other requirements                Passed - Lipid Panel completed in last 360 days     Lab Results   Component Value Date     CHOL 148 10/28/2021    HDL 34 (L) 10/28/2021    LDLCALC 85.2 10/28/2021    TRIG 144 10/28/2021             Passed - SBP is equal to or less than 150, DBP is equal to or less than 100     BP Readings from Last 1 Encounters:   10/28/21 (!) 140/88               Passed - Patient is less than 65 years old        Passed - Cr in normal range and within 360 days     Lab Results   Component Value Date    CREATININE 0.8 10/28/2021    CREATININE 1.0 03/24/2021    CREATININE 1.1 10/14/2020              Passed - eGFR in normal range and within 360 days     Lab Results   Component Value Date    EGFRNONAA >60 10/28/2021    EGFRNONAA >60 03/24/2021    EGFRNONAA 55 (A) 10/14/2020                 Signed Prescriptions Disp Refills    EScitalopram oxalate (LEXAPRO) 10 MG tablet 90 tablet 2     Sig: TAKE 1 TABLET BY MOUTH EVERY DAY       Psychiatry:  Antidepressants - SSRI Passed - 1/25/2022  9:46 PM        Passed - Patient is at least 18 years old        Passed - Valid encounter within last 15 months     Recent Visits  Date Type Provider Dept   10/28/21 Office Visit Major Kimble MD [facility]   03/17/21 Office Visit Major Kimble MD [facility]   10/14/20 Office Visit Major Kimble MD [facility]   09/15/20 Office Visit Major Kimble MD [facility]   07/23/20 Office Visit Major Kimble MD [facility]   03/10/20 Office Visit Major Kimble MD [facility]   Showing recent visits within past 720 days and meeting all other requirements  Future Appointments  No visits were found meeting these conditions.  Showing future appointments within next 150 days and meeting all other requirements                      Appointments  past 12m or future 3m with PCP    Date Provider   Last Visit   10/28/2021 Major Kimble MD   Next Visit   Visit date not found Major Kimble MD   ED visits in past 90 days: 0        Note composed:10:17 PM 01/26/2022

## 2022-02-09 DIAGNOSIS — Z12.11 COLON CANCER SCREENING: ICD-10-CM

## 2022-03-29 ENCOUNTER — PATIENT OUTREACH (OUTPATIENT)
Dept: ADMINISTRATIVE | Facility: HOSPITAL | Age: 62
End: 2022-03-29
Payer: MEDICARE

## 2022-03-29 DIAGNOSIS — Z12.31 SCREENING MAMMOGRAM FOR BREAST CANCER: Primary | ICD-10-CM

## 2022-03-29 NOTE — PROGRESS NOTES
Chart reviewed, immunization record updated.  No new results noted on [facility] or [facility] web site.  Care Everywhere unavailable.  Patient care coordination note updated.  MMG order placed.  Left detailed message for patient to return call to discuss Colorectal Cancer Screening and scheduling routine PCP visit.

## 2022-04-04 ENCOUNTER — PATIENT MESSAGE (OUTPATIENT)
Dept: ADMINISTRATIVE | Facility: HOSPITAL | Age: 62
End: 2022-04-04
Payer: MEDICARE

## 2022-04-21 ENCOUNTER — PATIENT OUTREACH (OUTPATIENT)
Dept: ADMINISTRATIVE | Facility: HOSPITAL | Age: 62
End: 2022-04-21
Payer: MEDICARE

## 2022-04-21 NOTE — PROGRESS NOTES
Chart reviewed, immunization record updated.  No new results noted on [facility] or [facility] web site.  Care Everywhere unavailable.  Patient care coordination note updated.  Left detailed message for patient to return call to discuss Colorectal Cancer Screening and scheduling routine PCP visit and MMG.

## 2022-05-04 ENCOUNTER — APPOINTMENT (OUTPATIENT)
Dept: RADIOLOGY | Facility: CLINIC | Age: 62
End: 2022-05-04
Attending: FAMILY MEDICINE
Payer: MEDICARE

## 2022-05-04 ENCOUNTER — OFFICE VISIT (OUTPATIENT)
Dept: FAMILY MEDICINE | Facility: CLINIC | Age: 62
End: 2022-05-04
Payer: MEDICARE

## 2022-05-04 VITALS
SYSTOLIC BLOOD PRESSURE: 122 MMHG | BODY MASS INDEX: 46.21 KG/M2 | DIASTOLIC BLOOD PRESSURE: 84 MMHG | OXYGEN SATURATION: 96 % | WEIGHT: 287.5 LBS | HEIGHT: 66 IN | RESPIRATION RATE: 18 BRPM | HEART RATE: 86 BPM

## 2022-05-04 DIAGNOSIS — G89.29 CHRONIC BILATERAL LOW BACK PAIN WITHOUT SCIATICA: ICD-10-CM

## 2022-05-04 DIAGNOSIS — M54.50 CHRONIC BILATERAL LOW BACK PAIN WITHOUT SCIATICA: Primary | ICD-10-CM

## 2022-05-04 DIAGNOSIS — M25.561 CHRONIC PAIN OF RIGHT KNEE: ICD-10-CM

## 2022-05-04 DIAGNOSIS — E66.01 MORBID OBESITY DUE TO EXCESS CALORIES: ICD-10-CM

## 2022-05-04 DIAGNOSIS — M54.50 CHRONIC BILATERAL LOW BACK PAIN WITHOUT SCIATICA: ICD-10-CM

## 2022-05-04 DIAGNOSIS — G89.29 CHRONIC PAIN OF RIGHT KNEE: ICD-10-CM

## 2022-05-04 DIAGNOSIS — E03.9 HYPOTHYROIDISM, UNSPECIFIED TYPE: ICD-10-CM

## 2022-05-04 DIAGNOSIS — E78.5 DYSLIPIDEMIA: ICD-10-CM

## 2022-05-04 DIAGNOSIS — G89.29 CHRONIC BILATERAL LOW BACK PAIN WITHOUT SCIATICA: Primary | ICD-10-CM

## 2022-05-04 DIAGNOSIS — F32.A DEPRESSION, UNSPECIFIED DEPRESSION TYPE: ICD-10-CM

## 2022-05-04 DIAGNOSIS — F51.04 PSYCHOPHYSIOLOGICAL INSOMNIA: ICD-10-CM

## 2022-05-04 DIAGNOSIS — M17.11 PRIMARY OSTEOARTHRITIS OF RIGHT KNEE: ICD-10-CM

## 2022-05-04 PROCEDURE — 1159F PR MEDICATION LIST DOCUMENTED IN MEDICAL RECORD: ICD-10-PCS | Mod: CPTII,S$GLB,, | Performed by: FAMILY MEDICINE

## 2022-05-04 PROCEDURE — 4010F PR ACE/ARB THEARPY RXD/TAKEN: ICD-10-PCS | Mod: CPTII,S$GLB,, | Performed by: FAMILY MEDICINE

## 2022-05-04 PROCEDURE — 3079F DIAST BP 80-89 MM HG: CPT | Mod: CPTII,S$GLB,, | Performed by: FAMILY MEDICINE

## 2022-05-04 PROCEDURE — 72100 XR LUMBAR SPINE AP AND LATERAL: ICD-10-PCS | Mod: 26,,, | Performed by: RADIOLOGY

## 2022-05-04 PROCEDURE — 3008F PR BODY MASS INDEX (BMI) DOCUMENTED: ICD-10-PCS | Mod: CPTII,S$GLB,, | Performed by: FAMILY MEDICINE

## 2022-05-04 PROCEDURE — 3074F SYST BP LT 130 MM HG: CPT | Mod: CPTII,S$GLB,, | Performed by: FAMILY MEDICINE

## 2022-05-04 PROCEDURE — 3079F PR MOST RECENT DIASTOLIC BLOOD PRESSURE 80-89 MM HG: ICD-10-PCS | Mod: CPTII,S$GLB,, | Performed by: FAMILY MEDICINE

## 2022-05-04 PROCEDURE — 4010F ACE/ARB THERAPY RXD/TAKEN: CPT | Mod: CPTII,S$GLB,, | Performed by: FAMILY MEDICINE

## 2022-05-04 PROCEDURE — 99214 OFFICE O/P EST MOD 30 MIN: CPT | Mod: 25,S$GLB,, | Performed by: FAMILY MEDICINE

## 2022-05-04 PROCEDURE — 1160F PR REVIEW ALL MEDS BY PRESCRIBER/CLIN PHARMACIST DOCUMENTED: ICD-10-PCS | Mod: CPTII,S$GLB,, | Performed by: FAMILY MEDICINE

## 2022-05-04 PROCEDURE — 1160F RVW MEDS BY RX/DR IN RCRD: CPT | Mod: CPTII,S$GLB,, | Performed by: FAMILY MEDICINE

## 2022-05-04 PROCEDURE — 99214 PR OFFICE/OUTPT VISIT, EST, LEVL IV, 30-39 MIN: ICD-10-PCS | Mod: 25,S$GLB,, | Performed by: FAMILY MEDICINE

## 2022-05-04 PROCEDURE — 99999 PR PBB SHADOW E&M-EST. PATIENT-LVL IV: ICD-10-PCS | Mod: PBBFAC,,, | Performed by: FAMILY MEDICINE

## 2022-05-04 PROCEDURE — 3074F PR MOST RECENT SYSTOLIC BLOOD PRESSURE < 130 MM HG: ICD-10-PCS | Mod: CPTII,S$GLB,, | Performed by: FAMILY MEDICINE

## 2022-05-04 PROCEDURE — 72100 X-RAY EXAM L-S SPINE 2/3 VWS: CPT | Mod: 26,,, | Performed by: RADIOLOGY

## 2022-05-04 PROCEDURE — 99214 OFFICE O/P EST MOD 30 MIN: CPT | Mod: PBBFAC | Performed by: FAMILY MEDICINE

## 2022-05-04 PROCEDURE — 20610 DRAIN/INJ JOINT/BURSA W/O US: CPT | Mod: PBBFAC | Performed by: FAMILY MEDICINE

## 2022-05-04 PROCEDURE — 20610 DRAIN/INJ JOINT/BURSA W/O US: CPT | Mod: RT,S$GLB,, | Performed by: FAMILY MEDICINE

## 2022-05-04 PROCEDURE — 99499 UNLISTED E&M SERVICE: CPT | Mod: S$GLB,,, | Performed by: FAMILY MEDICINE

## 2022-05-04 PROCEDURE — 20610 PR DRAIN/INJECT LARGE JOINT/BURSA: ICD-10-PCS | Mod: RT,S$GLB,, | Performed by: FAMILY MEDICINE

## 2022-05-04 PROCEDURE — 1159F MED LIST DOCD IN RCRD: CPT | Mod: CPTII,S$GLB,, | Performed by: FAMILY MEDICINE

## 2022-05-04 PROCEDURE — 3008F BODY MASS INDEX DOCD: CPT | Mod: CPTII,S$GLB,, | Performed by: FAMILY MEDICINE

## 2022-05-04 PROCEDURE — 99999 PR PBB SHADOW E&M-EST. PATIENT-LVL IV: CPT | Mod: PBBFAC,,, | Performed by: FAMILY MEDICINE

## 2022-05-04 PROCEDURE — 72100 X-RAY EXAM L-S SPINE 2/3 VWS: CPT | Mod: TC,PO

## 2022-05-04 PROCEDURE — 99499 RISK ADDL DX/OHS AUDIT: ICD-10-PCS | Mod: S$GLB,,, | Performed by: FAMILY MEDICINE

## 2022-05-04 RX ORDER — LIDOCAINE HYDROCHLORIDE 10 MG/ML
1.5 INJECTION INFILTRATION; PERINEURAL
Status: COMPLETED | OUTPATIENT
Start: 2022-05-04 | End: 2022-05-04

## 2022-05-04 RX ORDER — TRIAMCINOLONE ACETONIDE 40 MG/ML
60 INJECTION, SUSPENSION INTRA-ARTICULAR; INTRAMUSCULAR
Status: COMPLETED | OUTPATIENT
Start: 2022-05-04 | End: 2022-05-04

## 2022-05-04 RX ORDER — DULOXETIN HYDROCHLORIDE 30 MG/1
30 CAPSULE, DELAYED RELEASE ORAL DAILY
Qty: 30 CAPSULE | Refills: 0 | Status: SHIPPED | OUTPATIENT
Start: 2022-05-04 | End: 2022-05-29

## 2022-05-04 RX ADMIN — LIDOCAINE HYDROCHLORIDE 1.5 ML: 10 INJECTION INFILTRATION; PERINEURAL at 03:05

## 2022-05-04 RX ADMIN — TRIAMCINOLONE ACETONIDE 60 MG: 40 INJECTION, SUSPENSION INTRA-ARTICULAR; INTRAMUSCULAR at 03:05

## 2022-05-04 NOTE — PROGRESS NOTES
Subjective:       Patient ID: Gricelda Gil is a 62 y.o. female.    Chief Complaint: Knee Pain (Bilateral knee pain), Back Pain (Back pain getting worse), Insomnia, Rash (Round brown spot on L forearm, pt states that recently it has became raised with a rough feeling to the touch), and Depression (Discuss meds due to depression)    Pt is a 62 y.o. female who presents for evaluation and management of   Encounter Diagnoses   Name Primary?    Chronic bilateral low back pain without sciatica Yes    Chronic pain of right knee     Psychophysiological insomnia     Morbid obesity due to excess calories     Hypothyroidism, unspecified type     Dyslipidemia     Depression, unspecified depression type     Primary osteoarthritis of right knee    .  Doing well on current meds. Denies any side effects. Prevention is up to date.  Review of Systems   Constitutional: Positive for fatigue. Negative for activity change and unexpected weight change.   HENT: Negative for hearing loss, rhinorrhea and trouble swallowing.    Eyes: Negative for discharge and visual disturbance.   Respiratory: Negative for chest tightness and wheezing.    Cardiovascular: Negative for chest pain and palpitations.   Gastrointestinal: Negative for blood in stool, constipation, diarrhea and vomiting.   Endocrine: Negative for polydipsia and polyuria.   Genitourinary: Negative for difficulty urinating, dysuria, hematuria and menstrual problem.   Musculoskeletal: Positive for arthralgias and joint swelling. Negative for neck pain.   Neurological: Negative for weakness and headaches.   Psychiatric/Behavioral: Positive for dysphoric mood. Negative for confusion.       Objective:      Physical Exam  Constitutional:       Appearance: She is well-developed. She is obese.   HENT:      Head: Normocephalic and atraumatic.      Right Ear: External ear normal.      Left Ear: External ear normal.      Nose: Nose normal.   Eyes:      Pupils: Pupils are equal,  round, and reactive to light.   Neck:      Thyroid: No thyromegaly.      Vascular: No JVD.      Trachea: No tracheal deviation.   Cardiovascular:      Rate and Rhythm: Normal rate.      Heart sounds: Normal heart sounds. No murmur heard.  Pulmonary:      Effort: Pulmonary effort is normal. No respiratory distress.      Breath sounds: Normal breath sounds. No wheezing or rales.   Chest:      Chest wall: No tenderness.   Abdominal:      General: Bowel sounds are normal. There is no distension.      Palpations: Abdomen is soft. There is no mass.      Tenderness: There is no abdominal tenderness. There is no guarding or rebound.   Musculoskeletal:         General: Tenderness present.      Cervical back: Normal range of motion and neck supple.      Comments: TTP midline lower lumbar   Crepitus right knee    Lymphadenopathy:      Cervical: No cervical adenopathy.   Skin:     General: Skin is warm and dry.      Coloration: Skin is not pale.      Findings: No erythema or rash.   Neurological:      Mental Status: She is alert and oriented to person, place, and time.      Cranial Nerves: No cranial nerve deficit.      Motor: No abnormal muscle tone.      Coordination: Coordination normal.      Deep Tendon Reflexes: Reflexes are normal and symmetric. Reflexes normal.   Psychiatric:         Behavior: Behavior normal.         Thought Content: Thought content normal.         Judgment: Judgment normal.         Assessment:       1. Chronic bilateral low back pain without sciatica    2. Chronic pain of right knee    3. Psychophysiological insomnia    4. Morbid obesity due to excess calories    5. Hypothyroidism, unspecified type    6. Dyslipidemia    7. Depression, unspecified depression type    8. Primary osteoarthritis of right knee        Plan:   Gricelda was seen today for knee pain, back pain, insomnia, rash and depression.    Diagnoses and all orders for this visit:    Chronic bilateral low back pain without sciatica  -      DULoxetine (CYMBALTA) 30 MG capsule; Take 1 capsule (30 mg total) by mouth once daily.  -     X-Ray Lumbar Spine AP And Lateral; Future  -     Ambulatory referral/consult to Physical/Occupational Therapy; Future    Chronic pain of right knee  -     X-Ray Knee 1 or 2 View Right; Future  -     DULoxetine (CYMBALTA) 30 MG capsule; Take 1 capsule (30 mg total) by mouth once daily.  -     Ambulatory referral/consult to Physical/Occupational Therapy; Future    Psychophysiological insomnia  -     TSH; Future    Morbid obesity due to excess calories    Hypothyroidism, unspecified type  -     TSH; Future    Dyslipidemia  -     Comprehensive Metabolic Panel; Future    Depression, unspecified depression type  -     DULoxetine (CYMBALTA) 30 MG capsule; Take 1 capsule (30 mg total) by mouth once daily.    Primary osteoarthritis of right knee  -     triamcinolone acetonide injection 60 mg  -     LIDOcaine HCL 10 mg/ml (1%) injection 1.5 mL      Problem List Items Addressed This Visit     Dyslipidemia    Relevant Orders    Comprehensive Metabolic Panel    Hypothyroid    Relevant Orders    TSH    Insomnia    Relevant Orders    TSH    Morbid obesity due to excess calories      Other Visit Diagnoses     Chronic bilateral low back pain without sciatica    -  Primary    Relevant Medications    DULoxetine (CYMBALTA) 30 MG capsule    Other Relevant Orders    X-Ray Lumbar Spine AP And Lateral (Completed)    Ambulatory referral/consult to Physical/Occupational Therapy    Chronic pain of right knee        Relevant Medications    DULoxetine (CYMBALTA) 30 MG capsule    Other Relevant Orders    X-Ray Knee 1 or 2 View Right (Completed)    Ambulatory referral/consult to Physical/Occupational Therapy    Depression, unspecified depression type        Relevant Medications    DULoxetine (CYMBALTA) 30 MG capsule    Primary osteoarthritis of right knee        Relevant Medications    triamcinolone acetonide injection 60 mg (Start on 5/4/2022  3:15 PM)     LIDOcaine HCL 10 mg/ml (1%) injection 1.5 mL (Start on 5/4/2022  3:15 PM)           Area prepped and draped in sterile fashion. Using anterior/lateral approach, right knee(s) injected with kenalog 60mg and lidocaine 1.5ml. Pt joseph well with good relief        No follow-ups on file.

## 2022-05-25 ENCOUNTER — TELEPHONE (OUTPATIENT)
Dept: FAMILY MEDICINE | Facility: CLINIC | Age: 62
End: 2022-05-25
Payer: MEDICARE

## 2022-05-25 NOTE — TELEPHONE ENCOUNTER
----- Message from Leodan Walters sent at 2022  3:31 PM CDT -----  Contact: Patient  Gricelda Gil  MRN: [id number]  : 1960  PCP: Major Kimble  Home Phone      [phone]  Work Phone      Not on file.  Mobile          [phone]      MESSAGE: lost Rx for Seroquel Rx --requesting to speak with nurse    Call [phone]    PCP: Lamin

## 2022-05-25 NOTE — TELEPHONE ENCOUNTER
Spoke to pt, states she has found her Seroquel medication. States it was behind all of her other meds. Intruycted to contact office with any questions/conerns.

## 2022-05-27 DIAGNOSIS — G89.29 CHRONIC PAIN OF RIGHT KNEE: ICD-10-CM

## 2022-05-27 DIAGNOSIS — M54.50 CHRONIC BILATERAL LOW BACK PAIN WITHOUT SCIATICA: ICD-10-CM

## 2022-05-27 DIAGNOSIS — F32.A DEPRESSION, UNSPECIFIED DEPRESSION TYPE: ICD-10-CM

## 2022-05-27 DIAGNOSIS — M25.561 CHRONIC PAIN OF RIGHT KNEE: ICD-10-CM

## 2022-05-27 DIAGNOSIS — G89.29 CHRONIC BILATERAL LOW BACK PAIN WITHOUT SCIATICA: ICD-10-CM

## 2022-05-27 NOTE — TELEPHONE ENCOUNTER
Refill Routing Note   Medication(s) are not appropriate for processing by [facility] for the following reason(s):      - Indication is outside of scope for [facility]    [facility] action(s):  Route          Medication reconciliation completed: No     Appointments  past 12m or future 3m with PCP    Date Provider   Last Visit   5/4/2022 Major Kimble MD   Next Visit   6/6/2022 Major Kimble MD   ED visits in past 90 days: 0        Note composed:9:43 AM 05/27/2022

## 2022-05-27 NOTE — TELEPHONE ENCOUNTER
No new care gaps identified.  [facility] Embedded Care Gaps. Reference number: [id number]. 5/27/2022   9:33:43 AM RACHANAT

## 2022-05-29 RX ORDER — DULOXETIN HYDROCHLORIDE 30 MG/1
CAPSULE, DELAYED RELEASE ORAL
Qty: 30 CAPSULE | Refills: 0 | Status: SHIPPED | OUTPATIENT
Start: 2022-05-29 | End: 2022-06-13 | Stop reason: SDUPTHER

## 2022-06-13 ENCOUNTER — CLINICAL SUPPORT (OUTPATIENT)
Dept: FAMILY MEDICINE | Facility: CLINIC | Age: 62
End: 2022-06-13
Payer: MEDICARE

## 2022-06-13 ENCOUNTER — OFFICE VISIT (OUTPATIENT)
Dept: FAMILY MEDICINE | Facility: CLINIC | Age: 62
End: 2022-06-13
Payer: MEDICARE

## 2022-06-13 VITALS
HEIGHT: 66 IN | HEART RATE: 92 BPM | WEIGHT: 293 LBS | OXYGEN SATURATION: 97 % | BODY MASS INDEX: 47.09 KG/M2 | SYSTOLIC BLOOD PRESSURE: 112 MMHG | RESPIRATION RATE: 18 BRPM | DIASTOLIC BLOOD PRESSURE: 70 MMHG

## 2022-06-13 DIAGNOSIS — E78.5 DYSLIPIDEMIA: ICD-10-CM

## 2022-06-13 DIAGNOSIS — R63.5 WEIGHT GAIN: ICD-10-CM

## 2022-06-13 DIAGNOSIS — G89.29 CHRONIC BILATERAL LOW BACK PAIN WITHOUT SCIATICA: ICD-10-CM

## 2022-06-13 DIAGNOSIS — F32.A DEPRESSION, UNSPECIFIED DEPRESSION TYPE: ICD-10-CM

## 2022-06-13 DIAGNOSIS — E66.01 MORBID OBESITY DUE TO EXCESS CALORIES: ICD-10-CM

## 2022-06-13 DIAGNOSIS — F51.04 PSYCHOPHYSIOLOGICAL INSOMNIA: ICD-10-CM

## 2022-06-13 DIAGNOSIS — M54.50 CHRONIC BILATERAL LOW BACK PAIN WITHOUT SCIATICA: ICD-10-CM

## 2022-06-13 DIAGNOSIS — E03.9 HYPOTHYROIDISM, UNSPECIFIED TYPE: ICD-10-CM

## 2022-06-13 DIAGNOSIS — G89.29 CHRONIC PAIN OF RIGHT KNEE: ICD-10-CM

## 2022-06-13 DIAGNOSIS — F41.9 ANXIETY: ICD-10-CM

## 2022-06-13 DIAGNOSIS — M25.561 CHRONIC PAIN OF RIGHT KNEE: ICD-10-CM

## 2022-06-13 DIAGNOSIS — R53.83 FATIGUE, UNSPECIFIED TYPE: Primary | ICD-10-CM

## 2022-06-13 DIAGNOSIS — E53.8 DEFICIENCY OF OTHER SPECIFIED B GROUP VITAMINS: ICD-10-CM

## 2022-06-13 LAB
ALBUMIN SERPL BCP-MCNC: 3.6 G/DL (ref 3.5–5.2)
ALP SERPL-CCNC: 152 U/L (ref 55–135)
ALT SERPL W/O P-5'-P-CCNC: 18 U/L (ref 10–44)
ANION GAP SERPL CALC-SCNC: 13 MMOL/L (ref 8–16)
AST SERPL-CCNC: 14 U/L (ref 10–40)
BILIRUB SERPL-MCNC: 0.9 MG/DL (ref 0.1–1)
BUN SERPL-MCNC: 11 MG/DL (ref 8–23)
CALCIUM SERPL-MCNC: 9.7 MG/DL (ref 8.7–10.5)
CHLORIDE SERPL-SCNC: 105 MMOL/L (ref 95–110)
CO2 SERPL-SCNC: 24 MMOL/L (ref 23–29)
CREAT SERPL-MCNC: 0.9 MG/DL (ref 0.5–1.4)
EST. GFR  (AFRICAN AMERICAN): >60 ML/MIN/1.73 M^2
EST. GFR  (NON AFRICAN AMERICAN): >60 ML/MIN/1.73 M^2
GLUCOSE SERPL-MCNC: 126 MG/DL (ref 70–110)
POTASSIUM SERPL-SCNC: 4.2 MMOL/L (ref 3.5–5.1)
PROT SERPL-MCNC: 7.2 G/DL (ref 6–8.4)
SODIUM SERPL-SCNC: 142 MMOL/L (ref 136–145)
TSH SERPL DL<=0.005 MIU/L-ACNC: 1.05 UIU/ML (ref 0.4–4)

## 2022-06-13 PROCEDURE — 36415 COLL VENOUS BLD VENIPUNCTURE: CPT | Mod: S$GLB,,, | Performed by: FAMILY MEDICINE

## 2022-06-13 PROCEDURE — 80053 COMPREHEN METABOLIC PANEL: CPT | Performed by: FAMILY MEDICINE

## 2022-06-13 PROCEDURE — 3078F PR MOST RECENT DIASTOLIC BLOOD PRESSURE < 80 MM HG: ICD-10-PCS | Mod: CPTII,S$GLB,, | Performed by: FAMILY MEDICINE

## 2022-06-13 PROCEDURE — 1160F RVW MEDS BY RX/DR IN RCRD: CPT | Mod: CPTII,S$GLB,, | Performed by: FAMILY MEDICINE

## 2022-06-13 PROCEDURE — 84443 ASSAY THYROID STIM HORMONE: CPT | Performed by: FAMILY MEDICINE

## 2022-06-13 PROCEDURE — 3008F PR BODY MASS INDEX (BMI) DOCUMENTED: ICD-10-PCS | Mod: CPTII,S$GLB,, | Performed by: FAMILY MEDICINE

## 2022-06-13 PROCEDURE — 3074F SYST BP LT 130 MM HG: CPT | Mod: CPTII,S$GLB,, | Performed by: FAMILY MEDICINE

## 2022-06-13 PROCEDURE — 3078F DIAST BP <80 MM HG: CPT | Mod: CPTII,S$GLB,, | Performed by: FAMILY MEDICINE

## 2022-06-13 PROCEDURE — 36415 PR COLLECTION VENOUS BLOOD,VENIPUNCTURE: ICD-10-PCS | Mod: S$GLB,,, | Performed by: FAMILY MEDICINE

## 2022-06-13 PROCEDURE — 82607 VITAMIN B-12: CPT | Performed by: FAMILY MEDICINE

## 2022-06-13 PROCEDURE — 1160F PR REVIEW ALL MEDS BY PRESCRIBER/CLIN PHARMACIST DOCUMENTED: ICD-10-PCS | Mod: CPTII,S$GLB,, | Performed by: FAMILY MEDICINE

## 2022-06-13 PROCEDURE — 99999 PR PBB SHADOW E&M-EST. PATIENT-LVL III: CPT | Mod: PBBFAC,,, | Performed by: FAMILY MEDICINE

## 2022-06-13 PROCEDURE — 4010F PR ACE/ARB THEARPY RXD/TAKEN: ICD-10-PCS | Mod: CPTII,S$GLB,, | Performed by: FAMILY MEDICINE

## 2022-06-13 PROCEDURE — 1159F PR MEDICATION LIST DOCUMENTED IN MEDICAL RECORD: ICD-10-PCS | Mod: CPTII,S$GLB,, | Performed by: FAMILY MEDICINE

## 2022-06-13 PROCEDURE — 99999 PR PBB SHADOW E&M-EST. PATIENT-LVL III: ICD-10-PCS | Mod: PBBFAC,,, | Performed by: FAMILY MEDICINE

## 2022-06-13 PROCEDURE — 3074F PR MOST RECENT SYSTOLIC BLOOD PRESSURE < 130 MM HG: ICD-10-PCS | Mod: CPTII,S$GLB,, | Performed by: FAMILY MEDICINE

## 2022-06-13 PROCEDURE — 4010F ACE/ARB THERAPY RXD/TAKEN: CPT | Mod: CPTII,S$GLB,, | Performed by: FAMILY MEDICINE

## 2022-06-13 PROCEDURE — 99214 PR OFFICE/OUTPT VISIT, EST, LEVL IV, 30-39 MIN: ICD-10-PCS | Mod: S$GLB,,, | Performed by: FAMILY MEDICINE

## 2022-06-13 PROCEDURE — 99214 OFFICE O/P EST MOD 30 MIN: CPT | Mod: S$GLB,,, | Performed by: FAMILY MEDICINE

## 2022-06-13 PROCEDURE — 3008F BODY MASS INDEX DOCD: CPT | Mod: CPTII,S$GLB,, | Performed by: FAMILY MEDICINE

## 2022-06-13 PROCEDURE — 1159F MED LIST DOCD IN RCRD: CPT | Mod: CPTII,S$GLB,, | Performed by: FAMILY MEDICINE

## 2022-06-13 RX ORDER — DULOXETIN HYDROCHLORIDE 30 MG/1
30 CAPSULE, DELAYED RELEASE ORAL DAILY
Qty: 30 CAPSULE | Refills: 5 | Status: SHIPPED | OUTPATIENT
Start: 2022-06-13 | End: 2022-10-13

## 2022-06-13 NOTE — PROGRESS NOTES
Subjective:       Patient ID: Gricelda Gil is a 62 y.o. female.    Chief Complaint: Follow-up    Pt is a 62 y.o. female who presents for evaluation and management of   Encounter Diagnoses   Name Primary?    Hypothyroidism, unspecified type     Fatigue, unspecified type Yes    Weight gain     Morbid obesity due to excess calories     Anxiety     Chronic bilateral low back pain without sciatica     Chronic pain of right knee     Depression, unspecified depression type    .  Doing well on current meds. Denies any side effects. Prevention is up to date.  Review of Systems   Constitutional: Positive for fatigue and unexpected weight change. Negative for chills and fever.        Can't lose weight   Convinced it has to be that her thyroid is not at goal TSH   HENT: Positive for sore throat.    Respiratory: Negative for shortness of breath.    Cardiovascular: Negative for chest pain and palpitations.   Gastrointestinal: Positive for nausea. Negative for abdominal pain, blood in stool, constipation and vomiting.   Genitourinary: Negative for difficulty urinating.   Psychiatric/Behavioral: Negative for dysphoric mood, sleep disturbance and suicidal ideas. The patient is not nervous/anxious.        Objective:      Physical Exam  Constitutional:       Appearance: She is well-developed. She is obese.   HENT:      Head: Normocephalic and atraumatic.      Right Ear: External ear normal.      Left Ear: External ear normal.      Nose: Nose normal.   Eyes:      Pupils: Pupils are equal, round, and reactive to light.   Neck:      Thyroid: No thyromegaly.      Vascular: No JVD.      Trachea: No tracheal deviation.   Cardiovascular:      Rate and Rhythm: Normal rate.      Heart sounds: Normal heart sounds. No murmur heard.  Pulmonary:      Effort: Pulmonary effort is normal. No respiratory distress.      Breath sounds: Normal breath sounds. No wheezing or rales.   Chest:      Chest wall: No tenderness.   Abdominal:       General: Bowel sounds are normal. There is no distension.      Palpations: Abdomen is soft. There is no mass.      Tenderness: There is no abdominal tenderness. There is no guarding or rebound.   Musculoskeletal:         General: No tenderness. Normal range of motion.      Cervical back: Normal range of motion and neck supple.      Right lower leg: Edema present.      Left lower leg: Edema present.   Lymphadenopathy:      Cervical: No cervical adenopathy.   Skin:     General: Skin is warm and dry.      Coloration: Skin is not pale.      Findings: No erythema or rash.   Neurological:      Mental Status: She is alert and oriented to person, place, and time.      Cranial Nerves: No cranial nerve deficit.      Motor: No abnormal muscle tone.      Coordination: Coordination normal.      Deep Tendon Reflexes: Reflexes are normal and symmetric. Reflexes normal.   Psychiatric:         Behavior: Behavior normal.         Thought Content: Thought content normal.         Judgment: Judgment normal.         Assessment:       1. Fatigue, unspecified type    2. Hypothyroidism, unspecified type    3. Weight gain    4. Morbid obesity due to excess calories    5. Anxiety    6. Chronic bilateral low back pain without sciatica    7. Chronic pain of right knee    8. Depression, unspecified depression type        Plan:   Gricelda was seen today for follow-up.    Diagnoses and all orders for this visit:    Fatigue, unspecified type    Hypothyroidism, unspecified type    Weight gain    Morbid obesity due to excess calories    Anxiety    Chronic bilateral low back pain without sciatica  -     DULoxetine (CYMBALTA) 30 MG capsule; Take 1 capsule (30 mg total) by mouth once daily.    Chronic pain of right knee  -     DULoxetine (CYMBALTA) 30 MG capsule; Take 1 capsule (30 mg total) by mouth once daily.    Depression, unspecified depression type  -     DULoxetine (CYMBALTA) 30 MG capsule; Take 1 capsule (30 mg total) by mouth once  daily.      Problem List Items Addressed This Visit     Anxiety    Hypothyroid    Overview     Lab Results   Component Value Date    TSH 2.233 10/28/2021                Morbid obesity due to excess calories      Other Visit Diagnoses     Fatigue, unspecified type    -  Primary    Weight gain        Chronic bilateral low back pain without sciatica        Relevant Medications    DULoxetine (CYMBALTA) 30 MG capsule    Chronic pain of right knee        Relevant Medications    DULoxetine (CYMBALTA) 30 MG capsule    Depression, unspecified depression type        Relevant Medications    DULoxetine (CYMBALTA) 30 MG capsule      TSH and CMP done today   Await results   She has had KERRIE surgery. Sleep study years ago was negative.   Lose weight   The patient is asked to make an attempt to improve diet and exercise patterns to aid in medical management of this problem.  5 small meals a day. Cut out white carbs: bread, rice, pasta, potatoes. Exercise/walk 5x/week for at least 30 minutes  .    No follow-ups on file.

## 2022-06-14 LAB — VIT B12 SERPL-MCNC: 229 PG/ML (ref 210–950)

## 2022-06-14 NOTE — PROGRESS NOTES
Test results normal  Her thyroid function is very normal. The Cymbalta I gave her may give her some extra energy  Come see me in a month to see how things are going thanks

## 2022-06-14 NOTE — PROGRESS NOTES
B12 is a little low  Tell her to get on 1000mcg daily OTC and come see me in a month. We will see how she feels and do some repeat bloodwork thanks

## 2022-07-25 ENCOUNTER — PATIENT OUTREACH (OUTPATIENT)
Dept: ADMINISTRATIVE | Facility: HOSPITAL | Age: 62
End: 2022-07-25
Payer: MEDICARE

## 2022-07-25 ENCOUNTER — TELEPHONE (OUTPATIENT)
Dept: FAMILY MEDICINE | Facility: CLINIC | Age: 62
End: 2022-07-25
Payer: MEDICARE

## 2022-07-25 NOTE — TELEPHONE ENCOUNTER
----- Message from Kiera Jay sent at 7/25/2022 10:12 AM CDT -----  Contact: self  Pt called asking why she is coming back again. (Pt had scheduled her own appt but it was a reschedule and pt is wondering why she needs to go back in)    Phone:  [phone]

## 2022-07-25 NOTE — TELEPHONE ENCOUNTER
Spoke to pt, states appt on 07/28 is no longer needed since she was seen for reason on 07/21. Appt on 07/28 cancelled per pt's request.

## 2022-10-03 ENCOUNTER — PATIENT MESSAGE (OUTPATIENT)
Dept: ADMINISTRATIVE | Facility: HOSPITAL | Age: 62
End: 2022-10-03
Payer: MEDICARE

## 2022-10-10 ENCOUNTER — OFFICE VISIT (OUTPATIENT)
Dept: FAMILY MEDICINE | Facility: CLINIC | Age: 62
End: 2022-10-10
Payer: MEDICARE

## 2022-10-10 VITALS
HEIGHT: 66 IN | SYSTOLIC BLOOD PRESSURE: 133 MMHG | RESPIRATION RATE: 18 BRPM | HEART RATE: 88 BPM | WEIGHT: 283.31 LBS | DIASTOLIC BLOOD PRESSURE: 80 MMHG | BODY MASS INDEX: 45.53 KG/M2

## 2022-10-10 DIAGNOSIS — E66.01 CLASS 3 SEVERE OBESITY WITH SERIOUS COMORBIDITY AND BODY MASS INDEX (BMI) OF 45.0 TO 49.9 IN ADULT, UNSPECIFIED OBESITY TYPE: ICD-10-CM

## 2022-10-10 DIAGNOSIS — H92.02 LEFT EAR PAIN: Primary | ICD-10-CM

## 2022-10-10 DIAGNOSIS — M51.36 DDD (DEGENERATIVE DISC DISEASE), LUMBAR: ICD-10-CM

## 2022-10-10 DIAGNOSIS — H66.90 OTITIS MEDIA, UNSPECIFIED LATERALITY, UNSPECIFIED OTITIS MEDIA TYPE: ICD-10-CM

## 2022-10-10 DIAGNOSIS — M54.50 ACUTE BILATERAL LOW BACK PAIN WITHOUT SCIATICA: ICD-10-CM

## 2022-10-10 PROCEDURE — 3008F BODY MASS INDEX DOCD: CPT | Mod: CPTII,S$GLB,, | Performed by: FAMILY MEDICINE

## 2022-10-10 PROCEDURE — 3008F PR BODY MASS INDEX (BMI) DOCUMENTED: ICD-10-PCS | Mod: CPTII,S$GLB,, | Performed by: FAMILY MEDICINE

## 2022-10-10 PROCEDURE — 1159F MED LIST DOCD IN RCRD: CPT | Mod: CPTII,S$GLB,, | Performed by: FAMILY MEDICINE

## 2022-10-10 PROCEDURE — 99214 PR OFFICE/OUTPT VISIT, EST, LEVL IV, 30-39 MIN: ICD-10-PCS | Mod: 25,S$GLB,, | Performed by: FAMILY MEDICINE

## 2022-10-10 PROCEDURE — 99214 OFFICE O/P EST MOD 30 MIN: CPT | Mod: 25,S$GLB,, | Performed by: FAMILY MEDICINE

## 2022-10-10 PROCEDURE — 1159F PR MEDICATION LIST DOCUMENTED IN MEDICAL RECORD: ICD-10-PCS | Mod: CPTII,S$GLB,, | Performed by: FAMILY MEDICINE

## 2022-10-10 PROCEDURE — 1160F PR REVIEW ALL MEDS BY PRESCRIBER/CLIN PHARMACIST DOCUMENTED: ICD-10-PCS | Mod: CPTII,S$GLB,, | Performed by: FAMILY MEDICINE

## 2022-10-10 PROCEDURE — 4010F ACE/ARB THERAPY RXD/TAKEN: CPT | Mod: CPTII,S$GLB,, | Performed by: FAMILY MEDICINE

## 2022-10-10 PROCEDURE — 99999 PR PBB SHADOW E&M-EST. PATIENT-LVL III: ICD-10-PCS | Mod: PBBFAC,,, | Performed by: FAMILY MEDICINE

## 2022-10-10 PROCEDURE — 96372 PR INJECTION,THERAP/PROPH/DIAG2ST, IM OR SUBCUT: ICD-10-PCS | Mod: S$GLB,,, | Performed by: FAMILY MEDICINE

## 2022-10-10 PROCEDURE — 1160F RVW MEDS BY RX/DR IN RCRD: CPT | Mod: CPTII,S$GLB,, | Performed by: FAMILY MEDICINE

## 2022-10-10 PROCEDURE — 96372 THER/PROPH/DIAG INJ SC/IM: CPT | Mod: S$GLB,,, | Performed by: FAMILY MEDICINE

## 2022-10-10 PROCEDURE — 4010F PR ACE/ARB THEARPY RXD/TAKEN: ICD-10-PCS | Mod: CPTII,S$GLB,, | Performed by: FAMILY MEDICINE

## 2022-10-10 PROCEDURE — 99999 PR PBB SHADOW E&M-EST. PATIENT-LVL III: CPT | Mod: PBBFAC,,, | Performed by: FAMILY MEDICINE

## 2022-10-10 RX ORDER — AMOXICILLIN AND CLAVULANATE POTASSIUM 875; 125 MG/1; MG/1
1 TABLET, FILM COATED ORAL EVERY 12 HOURS
Qty: 20 TABLET | Refills: 0 | Status: SHIPPED | OUTPATIENT
Start: 2022-10-10 | End: 2022-10-20

## 2022-10-10 RX ORDER — TRIAMCINOLONE ACETONIDE 40 MG/ML
60 INJECTION, SUSPENSION INTRA-ARTICULAR; INTRAMUSCULAR
Status: COMPLETED | OUTPATIENT
Start: 2022-10-10 | End: 2022-10-10

## 2022-10-10 RX ORDER — OFLOXACIN 3 MG/ML
5 SOLUTION AURICULAR (OTIC) 2 TIMES DAILY
Qty: 10 ML | Refills: 0 | Status: SHIPPED | OUTPATIENT
Start: 2022-10-10 | End: 2022-10-20

## 2022-10-10 RX ADMIN — TRIAMCINOLONE ACETONIDE 60 MG: 40 INJECTION, SUSPENSION INTRA-ARTICULAR; INTRAMUSCULAR at 04:10

## 2022-10-10 NOTE — PROGRESS NOTES
Subjective:       Patient ID: Gricelda Gil is a 62 y.o. female.    Chief Complaint: Ear Drainage (L)    Pt is a 62 y.o. female who presents for evaluation and management of   Encounter Diagnoses   Name Primary?    Left ear pain Yes    DDD (degenerative disc disease), lumbar     Acute bilateral low back pain without sciatica     Otitis media, unspecified laterality, unspecified otitis media type     Class 3 severe obesity with serious comorbidity and body mass index (BMI) of 45.0 to 49.9 in adult, unspecified obesity type    .  Doing well on current meds. Denies any side effects. Prevention is up to date.  Review of Systems   Constitutional:  Negative for chills and fever.   HENT:  Positive for ear discharge and ear pain.    Respiratory:  Negative for shortness of breath.    Cardiovascular:  Negative for chest pain and palpitations.   Gastrointestinal:  Negative for abdominal pain, blood in stool, constipation and nausea.   Genitourinary:  Negative for difficulty urinating.   Musculoskeletal:  Positive for back pain.   Psychiatric/Behavioral:  Negative for dysphoric mood, sleep disturbance and suicidal ideas. The patient is not nervous/anxious.      Objective:      Physical Exam  Constitutional:       Appearance: She is well-developed. She is obese.   HENT:      Head: Normocephalic and atraumatic.      Right Ear: Tympanic membrane and external ear normal.      Left Ear: External ear normal.      Ears:      Comments: Left TM with purulent fluid behind TM and some discharge from PET      Nose: Nose normal.   Eyes:      Pupils: Pupils are equal, round, and reactive to light.   Neck:      Thyroid: No thyromegaly.      Vascular: No JVD.      Trachea: No tracheal deviation.   Cardiovascular:      Rate and Rhythm: Normal rate.      Heart sounds: Normal heart sounds. No murmur heard.  Pulmonary:      Effort: Pulmonary effort is normal. No respiratory distress.      Breath sounds: Normal breath sounds. No wheezing  or rales.   Chest:      Chest wall: No tenderness.   Abdominal:      General: Bowel sounds are normal. There is no distension.      Palpations: Abdomen is soft. There is no mass.      Tenderness: There is no abdominal tenderness. There is no guarding or rebound.   Musculoskeletal:         General: No tenderness. Normal range of motion.      Cervical back: Normal range of motion and neck supple.   Lymphadenopathy:      Cervical: No cervical adenopathy.   Skin:     General: Skin is warm and dry.      Coloration: Skin is not pale.      Findings: No erythema or rash.   Neurological:      Mental Status: She is alert and oriented to person, place, and time.      Cranial Nerves: No cranial nerve deficit.      Motor: No abnormal muscle tone.      Coordination: Coordination normal.      Deep Tendon Reflexes: Reflexes are normal and symmetric. Reflexes normal.   Psychiatric:         Behavior: Behavior normal.         Thought Content: Thought content normal.         Judgment: Judgment normal.       Assessment:       1. Left ear pain    2. DDD (degenerative disc disease), lumbar    3. Acute bilateral low back pain without sciatica    4. Otitis media, unspecified laterality, unspecified otitis media type    5. Class 3 severe obesity with serious comorbidity and body mass index (BMI) of 45.0 to 49.9 in adult, unspecified obesity type          Plan:   1. Left ear pain    2. DDD (degenerative disc disease), lumbar  -     triamcinolone acetonide injection 60 mg    3. Acute bilateral low back pain without sciatica  -     triamcinolone acetonide injection 60 mg    4. Otitis media, unspecified laterality, unspecified otitis media type  -     ofloxacin (FLOXIN) 0.3 % otic solution; Place 5 drops into the left ear 2 (two) times daily. for 10 days  Dispense: 10 mL; Refill: 0  -     amoxicillin-clavulanate 875-125mg (AUGMENTIN) 875-125 mg per tablet; Take 1 tablet by mouth every 12 (twelve) hours. for 10 days  Dispense: 20 tablet; Refill:  0    5. Class 3 severe obesity with serious comorbidity and body mass index (BMI) of 45.0 to 49.9 in adult, unspecified obesity type  -     Ambulatory referral/consult to General Surgery; Future; Expected date: 10/17/2022    RTC 3 weeks to recheck ear   Refer to coleen for bariatric surgery consult     No follow-ups on file.

## 2022-10-11 ENCOUNTER — PATIENT MESSAGE (OUTPATIENT)
Dept: FAMILY MEDICINE | Facility: CLINIC | Age: 62
End: 2022-10-11
Payer: MEDICARE

## 2022-10-11 DIAGNOSIS — M54.50 CHRONIC BILATERAL LOW BACK PAIN WITHOUT SCIATICA: ICD-10-CM

## 2022-10-11 DIAGNOSIS — F32.A DEPRESSION, UNSPECIFIED DEPRESSION TYPE: ICD-10-CM

## 2022-10-11 DIAGNOSIS — M25.561 CHRONIC PAIN OF RIGHT KNEE: ICD-10-CM

## 2022-10-11 DIAGNOSIS — G89.29 CHRONIC PAIN OF RIGHT KNEE: ICD-10-CM

## 2022-10-11 DIAGNOSIS — G89.29 CHRONIC BILATERAL LOW BACK PAIN WITHOUT SCIATICA: ICD-10-CM

## 2022-10-13 RX ORDER — DULOXETIN HYDROCHLORIDE 60 MG/1
60 CAPSULE, DELAYED RELEASE ORAL DAILY
Qty: 30 CAPSULE | Refills: 5 | Status: SHIPPED | OUTPATIENT
Start: 2022-10-13 | End: 2023-04-17 | Stop reason: SDUPTHER

## 2022-10-13 NOTE — TELEPHONE ENCOUNTER
Pt wanting to know if dosage on Duloxetine was supposed to be changed. States dosage was supposed to be increased. Pt is currently on 30 mg.     Please advise. Thanks

## 2022-10-14 ENCOUNTER — PATIENT MESSAGE (OUTPATIENT)
Dept: FAMILY MEDICINE | Facility: CLINIC | Age: 62
End: 2022-10-14
Payer: MEDICARE

## 2022-10-17 ENCOUNTER — PATIENT OUTREACH (OUTPATIENT)
Dept: ADMINISTRATIVE | Facility: HOSPITAL | Age: 62
End: 2022-10-17
Payer: MEDICARE

## 2022-11-03 ENCOUNTER — PATIENT MESSAGE (OUTPATIENT)
Dept: ADMINISTRATIVE | Facility: HOSPITAL | Age: 62
End: 2022-11-03
Payer: MEDICARE

## 2022-11-08 ENCOUNTER — OFFICE VISIT (OUTPATIENT)
Dept: FAMILY MEDICINE | Facility: CLINIC | Age: 62
End: 2022-11-08
Payer: MEDICARE

## 2022-11-08 ENCOUNTER — HOSPITAL ENCOUNTER (OUTPATIENT)
Dept: RADIOLOGY | Facility: HOSPITAL | Age: 62
Discharge: HOME | End: 2022-11-08
Attending: FAMILY MEDICINE
Payer: MEDICARE

## 2022-11-08 VITALS
RESPIRATION RATE: 12 BRPM | DIASTOLIC BLOOD PRESSURE: 82 MMHG | HEART RATE: 68 BPM | OXYGEN SATURATION: 96 % | SYSTOLIC BLOOD PRESSURE: 132 MMHG | WEIGHT: 284.63 LBS | BODY MASS INDEX: 45.74 KG/M2 | HEIGHT: 66 IN

## 2022-11-08 DIAGNOSIS — H66.92 LEFT OTITIS MEDIA, UNSPECIFIED OTITIS MEDIA TYPE: Primary | ICD-10-CM

## 2022-11-08 DIAGNOSIS — Z12.31 SCREENING MAMMOGRAM FOR BREAST CANCER: ICD-10-CM

## 2022-11-08 PROCEDURE — 77063 BREAST TOMOSYNTHESIS BI: CPT | Mod: 26,,, | Performed by: RADIOLOGY

## 2022-11-08 PROCEDURE — 77063 MAMMO DIGITAL SCREENING BILAT WITH TOMO: ICD-10-PCS | Mod: 26,,, | Performed by: RADIOLOGY

## 2022-11-08 PROCEDURE — 99999 PR PBB SHADOW E&M-EST. PATIENT-LVL III: ICD-10-PCS | Mod: PBBFAC,,, | Performed by: FAMILY MEDICINE

## 2022-11-08 PROCEDURE — 77067 MAMMO DIGITAL SCREENING BILAT WITH TOMO: ICD-10-PCS | Mod: 26,,, | Performed by: RADIOLOGY

## 2022-11-08 PROCEDURE — 1159F PR MEDICATION LIST DOCUMENTED IN MEDICAL RECORD: ICD-10-PCS | Mod: CPTII,S$GLB,, | Performed by: FAMILY MEDICINE

## 2022-11-08 PROCEDURE — 3075F PR MOST RECENT SYSTOLIC BLOOD PRESS GE 130-139MM HG: ICD-10-PCS | Mod: CPTII,S$GLB,, | Performed by: FAMILY MEDICINE

## 2022-11-08 PROCEDURE — G0008 ADMIN INFLUENZA VIRUS VAC: HCPCS | Mod: S$GLB,,, | Performed by: FAMILY MEDICINE

## 2022-11-08 PROCEDURE — 3008F PR BODY MASS INDEX (BMI) DOCUMENTED: ICD-10-PCS | Mod: CPTII,S$GLB,, | Performed by: FAMILY MEDICINE

## 2022-11-08 PROCEDURE — 3008F BODY MASS INDEX DOCD: CPT | Mod: CPTII,S$GLB,, | Performed by: FAMILY MEDICINE

## 2022-11-08 PROCEDURE — 90686 FLU VACCINE (QUAD) GREATER THAN OR EQUAL TO 3YO PRESERVATIVE FREE IM: ICD-10-PCS | Mod: S$GLB,,, | Performed by: FAMILY MEDICINE

## 2022-11-08 PROCEDURE — 77067 SCR MAMMO BI INCL CAD: CPT | Mod: 26,,, | Performed by: RADIOLOGY

## 2022-11-08 PROCEDURE — 3079F DIAST BP 80-89 MM HG: CPT | Mod: CPTII,S$GLB,, | Performed by: FAMILY MEDICINE

## 2022-11-08 PROCEDURE — 3079F PR MOST RECENT DIASTOLIC BLOOD PRESSURE 80-89 MM HG: ICD-10-PCS | Mod: CPTII,S$GLB,, | Performed by: FAMILY MEDICINE

## 2022-11-08 PROCEDURE — 99999 PR PBB SHADOW E&M-EST. PATIENT-LVL III: CPT | Mod: PBBFAC,,, | Performed by: FAMILY MEDICINE

## 2022-11-08 PROCEDURE — 3075F SYST BP GE 130 - 139MM HG: CPT | Mod: CPTII,S$GLB,, | Performed by: FAMILY MEDICINE

## 2022-11-08 PROCEDURE — 1160F RVW MEDS BY RX/DR IN RCRD: CPT | Mod: CPTII,S$GLB,, | Performed by: FAMILY MEDICINE

## 2022-11-08 PROCEDURE — 90686 IIV4 VACC NO PRSV 0.5 ML IM: CPT | Mod: S$GLB,,, | Performed by: FAMILY MEDICINE

## 2022-11-08 PROCEDURE — 4010F ACE/ARB THERAPY RXD/TAKEN: CPT | Mod: CPTII,S$GLB,, | Performed by: FAMILY MEDICINE

## 2022-11-08 PROCEDURE — 1159F MED LIST DOCD IN RCRD: CPT | Mod: CPTII,S$GLB,, | Performed by: FAMILY MEDICINE

## 2022-11-08 PROCEDURE — 77063 BREAST TOMOSYNTHESIS BI: CPT | Mod: TC

## 2022-11-08 PROCEDURE — 4010F PR ACE/ARB THEARPY RXD/TAKEN: ICD-10-PCS | Mod: CPTII,S$GLB,, | Performed by: FAMILY MEDICINE

## 2022-11-08 PROCEDURE — 77067 SCR MAMMO BI INCL CAD: CPT | Mod: TC

## 2022-11-08 PROCEDURE — G0008 FLU VACCINE (QUAD) GREATER THAN OR EQUAL TO 3YO PRESERVATIVE FREE IM: ICD-10-PCS | Mod: S$GLB,,, | Performed by: FAMILY MEDICINE

## 2022-11-08 PROCEDURE — 1160F PR REVIEW ALL MEDS BY PRESCRIBER/CLIN PHARMACIST DOCUMENTED: ICD-10-PCS | Mod: CPTII,S$GLB,, | Performed by: FAMILY MEDICINE

## 2022-11-08 PROCEDURE — 99213 OFFICE O/P EST LOW 20 MIN: CPT | Mod: 25,S$GLB,, | Performed by: FAMILY MEDICINE

## 2022-11-08 PROCEDURE — 99213 PR OFFICE/OUTPT VISIT, EST, LEVL III, 20-29 MIN: ICD-10-PCS | Mod: 25,S$GLB,, | Performed by: FAMILY MEDICINE

## 2022-11-08 NOTE — PROGRESS NOTES
Subjective:       Patient ID: Gricelda Gil is a 62 y.o. female.    Chief Complaint: Follow-up (3m follow-up/Pt states she has been having lower back pain )    Pt is a 62 y.o. female who presents for evaluation and management of   Encounter Diagnosis   Name Primary?    Left otitis media, unspecified otitis media type Yes   .  Doing well on current meds. Denies any side effects. Prevention is up to date.  Review of Systems   Constitutional:  Negative for chills and fever.   HENT:  Negative for ear discharge and ear pain.      Objective:      Physical Exam  Constitutional:       Appearance: She is well-developed. She is obese.   HENT:      Head: Normocephalic and atraumatic.      Right Ear: Tympanic membrane and external ear normal.      Left Ear: Tympanic membrane and external ear normal.      Ears:      Comments: Left TM clear with PET tub patent. No drainage        Nose: Nose normal.   Eyes:      Pupils: Pupils are equal, round, and reactive to light.   Neck:      Thyroid: No thyromegaly.      Vascular: No JVD.      Trachea: No tracheal deviation.   Cardiovascular:      Rate and Rhythm: Normal rate.      Heart sounds: Normal heart sounds. No murmur heard.  Pulmonary:      Effort: Pulmonary effort is normal. No respiratory distress.      Breath sounds: Normal breath sounds. No wheezing or rales.   Chest:      Chest wall: No tenderness.   Abdominal:      General: Bowel sounds are normal. There is no distension.      Palpations: Abdomen is soft. There is no mass.      Tenderness: There is no abdominal tenderness. There is no guarding or rebound.   Musculoskeletal:         General: No tenderness. Normal range of motion.      Cervical back: Normal range of motion and neck supple.   Lymphadenopathy:      Cervical: No cervical adenopathy.   Skin:     General: Skin is warm and dry.      Coloration: Skin is not pale.      Findings: No erythema or rash.   Neurological:      Mental Status: She is alert and oriented  to person, place, and time.      Cranial Nerves: No cranial nerve deficit.      Motor: No abnormal muscle tone.      Coordination: Coordination normal.      Deep Tendon Reflexes: Reflexes are normal and symmetric. Reflexes normal.   Psychiatric:         Behavior: Behavior normal.         Thought Content: Thought content normal.         Judgment: Judgment normal.       Assessment:       1. Left otitis media, unspecified otitis media type          Plan:   1. Left otitis media, unspecified otitis media type    Resolved     No follow-ups on file.

## 2022-11-10 ENCOUNTER — LAB VISIT (OUTPATIENT)
Dept: LAB | Facility: HOSPITAL | Age: 62
End: 2022-11-10
Attending: FAMILY MEDICINE
Payer: MEDICARE

## 2022-11-10 DIAGNOSIS — Z12.11 COLON CANCER SCREENING: ICD-10-CM

## 2022-11-10 PROCEDURE — 82274 ASSAY TEST FOR BLOOD FECAL: CPT | Performed by: FAMILY MEDICINE

## 2022-11-15 LAB — HEMOCCULT STL QL IA: POSITIVE

## 2022-11-16 ENCOUNTER — TELEPHONE (OUTPATIENT)
Dept: RADIOLOGY | Facility: CLINIC | Age: 62
End: 2022-11-16
Payer: MEDICARE

## 2022-11-16 NOTE — TELEPHONE ENCOUNTER
----- Message from Major Kimble MD sent at 11/15/2022  7:50 PM CST -----  Pos FIT   Please set her up for cscope with me. Thanks

## 2022-11-18 NOTE — TELEPHONE ENCOUNTER
Spoke to pt  and notified of results. States she needs some time to think about colonoscopy and will [facility] back when ready to schedule.

## 2023-01-23 ENCOUNTER — LAB VISIT (OUTPATIENT)
Dept: LAB | Facility: HOSPITAL | Age: 63
End: 2023-01-23
Attending: FAMILY MEDICINE
Payer: MEDICARE

## 2023-01-23 ENCOUNTER — OFFICE VISIT (OUTPATIENT)
Dept: FAMILY MEDICINE | Facility: CLINIC | Age: 63
End: 2023-01-23
Payer: MEDICARE

## 2023-01-23 VITALS
SYSTOLIC BLOOD PRESSURE: 124 MMHG | HEIGHT: 66 IN | RESPIRATION RATE: 14 BRPM | HEART RATE: 64 BPM | OXYGEN SATURATION: 97 % | BODY MASS INDEX: 45.74 KG/M2 | DIASTOLIC BLOOD PRESSURE: 82 MMHG | WEIGHT: 284.63 LBS

## 2023-01-23 DIAGNOSIS — R79.9 ABNORMAL FINDING OF BLOOD CHEMISTRY, UNSPECIFIED: ICD-10-CM

## 2023-01-23 DIAGNOSIS — G47.00 INSOMNIA, UNSPECIFIED TYPE: ICD-10-CM

## 2023-01-23 DIAGNOSIS — E03.9 HYPOTHYROIDISM, UNSPECIFIED TYPE: ICD-10-CM

## 2023-01-23 DIAGNOSIS — E03.4 HYPOTHYROIDISM DUE TO ACQUIRED ATROPHY OF THYROID: ICD-10-CM

## 2023-01-23 DIAGNOSIS — E78.5 DYSLIPIDEMIA: ICD-10-CM

## 2023-01-23 DIAGNOSIS — E66.01 MORBID OBESITY: ICD-10-CM

## 2023-01-23 DIAGNOSIS — I10 PRIMARY HYPERTENSION: ICD-10-CM

## 2023-01-23 DIAGNOSIS — M47.812 FACET ARTHROPATHY, CERVICAL: ICD-10-CM

## 2023-01-23 DIAGNOSIS — M47.816 LUMBAR FACET ARTHROPATHY: ICD-10-CM

## 2023-01-23 DIAGNOSIS — I10 PRIMARY HYPERTENSION: Primary | ICD-10-CM

## 2023-01-23 DIAGNOSIS — I10 ESSENTIAL HYPERTENSION: ICD-10-CM

## 2023-01-23 LAB
ALBUMIN SERPL BCP-MCNC: 3.7 G/DL (ref 3.5–5.2)
ALP SERPL-CCNC: 153 U/L (ref 55–135)
ALT SERPL W/O P-5'-P-CCNC: 19 U/L (ref 10–44)
ANION GAP SERPL CALC-SCNC: 8 MMOL/L (ref 8–16)
AST SERPL-CCNC: 16 U/L (ref 10–40)
BASOPHILS # BLD AUTO: 0.11 K/UL (ref 0–0.2)
BASOPHILS NFR BLD: 1.3 % (ref 0–1.9)
BILIRUB SERPL-MCNC: 0.8 MG/DL (ref 0.1–1)
BUN SERPL-MCNC: 9 MG/DL (ref 8–23)
CALCIUM SERPL-MCNC: 9.7 MG/DL (ref 8.7–10.5)
CHLORIDE SERPL-SCNC: 105 MMOL/L (ref 95–110)
CHOLEST SERPL-MCNC: 155 MG/DL (ref 120–199)
CHOLEST/HDLC SERPL: 4.1 {RATIO} (ref 2–5)
CO2 SERPL-SCNC: 28 MMOL/L (ref 23–29)
CREAT SERPL-MCNC: 0.9 MG/DL (ref 0.5–1.4)
DIFFERENTIAL METHOD: ABNORMAL
EOSINOPHIL # BLD AUTO: 0.2 K/UL (ref 0–0.5)
EOSINOPHIL NFR BLD: 1.8 % (ref 0–8)
ERYTHROCYTE [DISTWIDTH] IN BLOOD BY AUTOMATED COUNT: 14.1 % (ref 11.5–14.5)
EST. GFR  (NO RACE VARIABLE): >60 ML/MIN/1.73 M^2
ESTIMATED AVG GLUCOSE: 120 MG/DL (ref 68–131)
GLUCOSE SERPL-MCNC: 90 MG/DL (ref 70–110)
HBA1C MFR BLD: 5.8 % (ref 4–5.6)
HCT VFR BLD AUTO: 45 % (ref 37–48.5)
HDLC SERPL-MCNC: 38 MG/DL (ref 40–75)
HDLC SERPL: 24.5 % (ref 20–50)
HGB BLD-MCNC: 14.3 G/DL (ref 12–16)
IMM GRANULOCYTES # BLD AUTO: 0.03 K/UL (ref 0–0.04)
IMM GRANULOCYTES NFR BLD AUTO: 0.3 % (ref 0–0.5)
LDLC SERPL CALC-MCNC: 96.8 MG/DL (ref 63–159)
LYMPHOCYTES # BLD AUTO: 2.2 K/UL (ref 1–4.8)
LYMPHOCYTES NFR BLD: 24.8 % (ref 18–48)
MCH RBC QN AUTO: 28.4 PG (ref 27–31)
MCHC RBC AUTO-ENTMCNC: 31.8 G/DL (ref 32–36)
MCV RBC AUTO: 89 FL (ref 82–98)
MONOCYTES # BLD AUTO: 1.1 K/UL (ref 0.3–1)
MONOCYTES NFR BLD: 12.5 % (ref 4–15)
NEUTROPHILS # BLD AUTO: 5.2 K/UL (ref 1.8–7.7)
NEUTROPHILS NFR BLD: 59.3 % (ref 38–73)
NONHDLC SERPL-MCNC: 117 MG/DL
NRBC BLD-RTO: 0 /100 WBC
PLATELET # BLD AUTO: 297 K/UL (ref 150–450)
PMV BLD AUTO: 10.3 FL (ref 9.2–12.9)
POTASSIUM SERPL-SCNC: 3.9 MMOL/L (ref 3.5–5.1)
PROT SERPL-MCNC: 7 G/DL (ref 6–8.4)
RBC # BLD AUTO: 5.04 M/UL (ref 4–5.4)
SODIUM SERPL-SCNC: 141 MMOL/L (ref 136–145)
TRIGL SERPL-MCNC: 101 MG/DL (ref 30–150)
TSH SERPL DL<=0.005 MIU/L-ACNC: 1.99 UIU/ML (ref 0.4–4)
WBC # BLD AUTO: 8.75 K/UL (ref 3.9–12.7)

## 2023-01-23 PROCEDURE — 1159F PR MEDICATION LIST DOCUMENTED IN MEDICAL RECORD: ICD-10-PCS | Mod: CPTII,S$GLB,, | Performed by: FAMILY MEDICINE

## 2023-01-23 PROCEDURE — 3079F PR MOST RECENT DIASTOLIC BLOOD PRESSURE 80-89 MM HG: ICD-10-PCS | Mod: CPTII,S$GLB,, | Performed by: FAMILY MEDICINE

## 2023-01-23 PROCEDURE — 83036 HEMOGLOBIN GLYCOSYLATED A1C: CPT | Performed by: FAMILY MEDICINE

## 2023-01-23 PROCEDURE — 85025 COMPLETE CBC W/AUTO DIFF WBC: CPT | Performed by: FAMILY MEDICINE

## 2023-01-23 PROCEDURE — 84443 ASSAY THYROID STIM HORMONE: CPT | Performed by: FAMILY MEDICINE

## 2023-01-23 PROCEDURE — 3074F SYST BP LT 130 MM HG: CPT | Mod: CPTII,S$GLB,, | Performed by: FAMILY MEDICINE

## 2023-01-23 PROCEDURE — 99499 RISK ADDL DX/OHS AUDIT: ICD-10-PCS | Mod: S$GLB,,, | Performed by: FAMILY MEDICINE

## 2023-01-23 PROCEDURE — 3074F PR MOST RECENT SYSTOLIC BLOOD PRESSURE < 130 MM HG: ICD-10-PCS | Mod: CPTII,S$GLB,, | Performed by: FAMILY MEDICINE

## 2023-01-23 PROCEDURE — 1160F PR REVIEW ALL MEDS BY PRESCRIBER/CLIN PHARMACIST DOCUMENTED: ICD-10-PCS | Mod: CPTII,S$GLB,, | Performed by: FAMILY MEDICINE

## 2023-01-23 PROCEDURE — 80061 LIPID PANEL: CPT | Performed by: FAMILY MEDICINE

## 2023-01-23 PROCEDURE — 3008F BODY MASS INDEX DOCD: CPT | Mod: CPTII,S$GLB,, | Performed by: FAMILY MEDICINE

## 2023-01-23 PROCEDURE — 36415 COLL VENOUS BLD VENIPUNCTURE: CPT | Performed by: FAMILY MEDICINE

## 2023-01-23 PROCEDURE — 3008F PR BODY MASS INDEX (BMI) DOCUMENTED: ICD-10-PCS | Mod: CPTII,S$GLB,, | Performed by: FAMILY MEDICINE

## 2023-01-23 PROCEDURE — 99999 PR PBB SHADOW E&M-EST. PATIENT-LVL III: ICD-10-PCS | Mod: PBBFAC,,, | Performed by: FAMILY MEDICINE

## 2023-01-23 PROCEDURE — 99214 PR OFFICE/OUTPT VISIT, EST, LEVL IV, 30-39 MIN: ICD-10-PCS | Mod: S$GLB,,, | Performed by: FAMILY MEDICINE

## 2023-01-23 PROCEDURE — 1160F RVW MEDS BY RX/DR IN RCRD: CPT | Mod: CPTII,S$GLB,, | Performed by: FAMILY MEDICINE

## 2023-01-23 PROCEDURE — 1159F MED LIST DOCD IN RCRD: CPT | Mod: CPTII,S$GLB,, | Performed by: FAMILY MEDICINE

## 2023-01-23 PROCEDURE — 99499 UNLISTED E&M SERVICE: CPT | Mod: S$GLB,,, | Performed by: FAMILY MEDICINE

## 2023-01-23 PROCEDURE — 3079F DIAST BP 80-89 MM HG: CPT | Mod: CPTII,S$GLB,, | Performed by: FAMILY MEDICINE

## 2023-01-23 PROCEDURE — 99999 PR PBB SHADOW E&M-EST. PATIENT-LVL III: CPT | Mod: PBBFAC,,, | Performed by: FAMILY MEDICINE

## 2023-01-23 PROCEDURE — 80053 COMPREHEN METABOLIC PANEL: CPT | Performed by: FAMILY MEDICINE

## 2023-01-23 PROCEDURE — 99214 OFFICE O/P EST MOD 30 MIN: CPT | Mod: S$GLB,,, | Performed by: FAMILY MEDICINE

## 2023-01-23 RX ORDER — QUETIAPINE FUMARATE 400 MG/1
TABLET, FILM COATED ORAL
Qty: 45 TABLET | Refills: 7 | Status: SHIPPED | OUTPATIENT
Start: 2023-01-23 | End: 2023-05-03

## 2023-01-23 RX ORDER — LEVOTHYROXINE SODIUM 150 UG/1
150 TABLET ORAL
Qty: 90 TABLET | Refills: 3 | Status: SHIPPED | OUTPATIENT
Start: 2023-01-23 | End: 2024-03-11 | Stop reason: SDUPTHER

## 2023-01-23 RX ORDER — ATORVASTATIN CALCIUM 40 MG/1
40 TABLET, FILM COATED ORAL DAILY
Qty: 90 TABLET | Refills: 3 | Status: SHIPPED | OUTPATIENT
Start: 2023-01-23 | End: 2024-01-03

## 2023-01-23 RX ORDER — AMLODIPINE AND BENAZEPRIL HYDROCHLORIDE 5; 40 MG/1; MG/1
1 CAPSULE ORAL DAILY
Qty: 90 CAPSULE | Refills: 3 | Status: SHIPPED | OUTPATIENT
Start: 2023-01-23 | End: 2024-01-03

## 2023-01-23 NOTE — PROGRESS NOTES
Subjective:       Patient ID: Gricelda Gil is a 62 y.o. female.    Chief Complaint: Follow-up    Pt is a 62 y.o. female who presents for evaluation and management of   Encounter Diagnoses   Name Primary?    Primary hypertension Yes    Hypothyroidism, unspecified type     Insomnia, unspecified type     Lumbar facet arthropathy     Facet arthropathy, cervical     Dyslipidemia     Morbid obesity     Abnormal finding of blood chemistry, unspecified     Essential hypertension     Hypothyroidism due to acquired atrophy of thyroid    .  Doing well on current meds. Denies any side effects. Prevention is up to date.    Review of Systems   Constitutional:  Negative for chills and fever.   Respiratory:  Negative for shortness of breath.    Cardiovascular:  Negative for chest pain and palpitations.   Gastrointestinal:  Negative for abdominal pain, blood in stool, constipation and nausea.   Genitourinary:  Negative for difficulty urinating.   Psychiatric/Behavioral:  Positive for sleep disturbance. Negative for dysphoric mood and suicidal ideas. The patient is nervous/anxious.      Objective:      Physical Exam  Constitutional:       Appearance: She is well-developed.   HENT:      Head: Normocephalic and atraumatic.      Right Ear: External ear normal.      Left Ear: External ear normal.      Nose: Nose normal.   Eyes:      Pupils: Pupils are equal, round, and reactive to light.   Neck:      Thyroid: No thyromegaly.      Vascular: No JVD.      Trachea: No tracheal deviation.   Cardiovascular:      Rate and Rhythm: Normal rate.      Heart sounds: Normal heart sounds. No murmur heard.  Pulmonary:      Effort: Pulmonary effort is normal. No respiratory distress.      Breath sounds: Normal breath sounds. No wheezing or rales.   Chest:      Chest wall: No tenderness.   Abdominal:      General: Bowel sounds are normal. There is no distension.      Palpations: Abdomen is soft. There is no mass.      Tenderness: There is no  abdominal tenderness. There is no guarding or rebound.   Musculoskeletal:         General: No tenderness. Normal range of motion.      Cervical back: Normal range of motion and neck supple.   Lymphadenopathy:      Cervical: No cervical adenopathy.   Skin:     General: Skin is warm and dry.      Coloration: Skin is not pale.      Findings: No erythema or rash.   Neurological:      Mental Status: She is alert and oriented to person, place, and time.      Cranial Nerves: No cranial nerve deficit.      Motor: No abnormal muscle tone.      Coordination: Coordination normal.      Deep Tendon Reflexes: Reflexes are normal and symmetric. Reflexes normal.   Psychiatric:         Behavior: Behavior normal.         Thought Content: Thought content normal.         Judgment: Judgment normal.       Assessment:       1. Primary hypertension    2. Hypothyroidism, unspecified type    3. Insomnia, unspecified type    4. Lumbar facet arthropathy    5. Facet arthropathy, cervical    6. Dyslipidemia    7. Morbid obesity    8. Abnormal finding of blood chemistry, unspecified    9. Essential hypertension    10. Hypothyroidism due to acquired atrophy of thyroid          Plan:   1. Primary hypertension  -     CBC Auto Differential; Future; Expected date: 01/23/2023  -     Comprehensive Metabolic Panel; Future; Expected date: 01/23/2023  -     Lipid Panel; Future; Expected date: 01/23/2023    2. Hypothyroidism, unspecified type  Overview:  Lab Results   Component Value Date    TSH 2.233 10/28/2021         Orders:  -     CBC Auto Differential; Future; Expected date: 01/23/2023  -     Hemoglobin A1C; Future; Expected date: 01/23/2023  -     Lipid Panel; Future; Expected date: 01/23/2023  -     TSH; Future; Expected date: 01/23/2023    3. Insomnia, unspecified type  -     QUEtiapine (SEROQUEL) 400 MG tablet; TAKE ONE-HALF TABLET BY MOUTH NIGHTLY.  Dispense: 45 tablet; Refill: 7    4. Lumbar facet arthropathy    5. Facet arthropathy,  cervical    6. Dyslipidemia  Overview:  Lab Results   Component Value Date    LDLCALC 85.2 10/28/2021   lipitor 40         Orders:  -     Comprehensive Metabolic Panel; Future; Expected date: 01/23/2023  -     Lipid Panel; Future; Expected date: 01/23/2023  -     TSH; Future; Expected date: 01/23/2023  -     atorvastatin (LIPITOR) 40 MG tablet; Take 1 tablet (40 mg total) by mouth once daily.  Dispense: 90 tablet; Refill: 3    7. Morbid obesity  -     Lipid Panel; Future; Expected date: 01/23/2023  -     TSH; Future; Expected date: 01/23/2023    8. Abnormal finding of blood chemistry, unspecified  -     Hemoglobin A1C; Future; Expected date: 01/23/2023    9. Essential hypertension  -     amLODIPine-benazepriL (LOTREL) 5-40 mg per capsule; Take 1 capsule by mouth once daily.  Dispense: 90 capsule; Refill: 3    10. Hypothyroidism due to acquired atrophy of thyroid  Overview:  Lab Results   Component Value Date    TSH 2.233 10/28/2021         Orders:  -     levothyroxine (SYNTHROID) 150 MCG tablet; Take 1 tablet (150 mcg total) by mouth before breakfast.  Dispense: 90 tablet; Refill: 3      No follow-ups on file.

## 2023-04-17 ENCOUNTER — TELEPHONE (OUTPATIENT)
Dept: FAMILY MEDICINE | Facility: CLINIC | Age: 63
End: 2023-04-17
Payer: MEDICARE

## 2023-04-17 DIAGNOSIS — G89.29 CHRONIC BILATERAL LOW BACK PAIN WITHOUT SCIATICA: ICD-10-CM

## 2023-04-17 DIAGNOSIS — F32.A DEPRESSION, UNSPECIFIED DEPRESSION TYPE: ICD-10-CM

## 2023-04-17 DIAGNOSIS — M25.561 CHRONIC PAIN OF RIGHT KNEE: ICD-10-CM

## 2023-04-17 DIAGNOSIS — M54.50 CHRONIC BILATERAL LOW BACK PAIN WITHOUT SCIATICA: ICD-10-CM

## 2023-04-17 DIAGNOSIS — G89.29 CHRONIC PAIN OF RIGHT KNEE: ICD-10-CM

## 2023-04-17 NOTE — TELEPHONE ENCOUNTER
----- Message from Andreea Harris sent at 2023 12:50 PM CDT -----  Contact: EMILIANO  Gricelda Gil  MRN: [id number]  : 1960  PCP: Major Kimble  Home Phone      [phone]  Work Phone      Not on file.  Mobile          [phone]      MESSAGE:   RX refill     DULoxetine (CYMBALTA) 60 MG capsule    30 day     LOV:2023    [facility]    [phone]

## 2023-05-03 ENCOUNTER — OFFICE VISIT (OUTPATIENT)
Dept: FAMILY MEDICINE | Facility: CLINIC | Age: 63
End: 2023-05-03
Payer: MEDICARE

## 2023-05-03 VITALS
BODY MASS INDEX: 46.11 KG/M2 | DIASTOLIC BLOOD PRESSURE: 80 MMHG | WEIGHT: 286.94 LBS | HEIGHT: 66 IN | SYSTOLIC BLOOD PRESSURE: 126 MMHG | RESPIRATION RATE: 20 BRPM | HEART RATE: 88 BPM

## 2023-05-03 DIAGNOSIS — G47.00 INSOMNIA, UNSPECIFIED TYPE: ICD-10-CM

## 2023-05-03 DIAGNOSIS — G89.29 CHRONIC LEFT-SIDED LOW BACK PAIN WITH LEFT-SIDED SCIATICA: ICD-10-CM

## 2023-05-03 DIAGNOSIS — M51.36 DDD (DEGENERATIVE DISC DISEASE), LUMBAR: Primary | ICD-10-CM

## 2023-05-03 DIAGNOSIS — M54.42 CHRONIC LEFT-SIDED LOW BACK PAIN WITH LEFT-SIDED SCIATICA: ICD-10-CM

## 2023-05-03 DIAGNOSIS — M47.816 LUMBAR FACET ARTHROPATHY: ICD-10-CM

## 2023-05-03 PROCEDURE — 3074F SYST BP LT 130 MM HG: CPT | Mod: CPTII,S$GLB,, | Performed by: FAMILY MEDICINE

## 2023-05-03 PROCEDURE — 99999 PR PBB SHADOW E&M-EST. PATIENT-LVL III: ICD-10-PCS | Mod: PBBFAC,,, | Performed by: FAMILY MEDICINE

## 2023-05-03 PROCEDURE — 96372 PR INJECTION,THERAP/PROPH/DIAG2ST, IM OR SUBCUT: ICD-10-PCS | Mod: S$GLB,,, | Performed by: FAMILY MEDICINE

## 2023-05-03 PROCEDURE — 4010F PR ACE/ARB THEARPY RXD/TAKEN: ICD-10-PCS | Mod: CPTII,S$GLB,, | Performed by: FAMILY MEDICINE

## 2023-05-03 PROCEDURE — 1159F MED LIST DOCD IN RCRD: CPT | Mod: CPTII,S$GLB,, | Performed by: FAMILY MEDICINE

## 2023-05-03 PROCEDURE — 1160F RVW MEDS BY RX/DR IN RCRD: CPT | Mod: CPTII,S$GLB,, | Performed by: FAMILY MEDICINE

## 2023-05-03 PROCEDURE — 99214 PR OFFICE/OUTPT VISIT, EST, LEVL IV, 30-39 MIN: ICD-10-PCS | Mod: 25,S$GLB,, | Performed by: FAMILY MEDICINE

## 2023-05-03 PROCEDURE — 3044F HG A1C LEVEL LT 7.0%: CPT | Mod: CPTII,S$GLB,, | Performed by: FAMILY MEDICINE

## 2023-05-03 PROCEDURE — 3008F PR BODY MASS INDEX (BMI) DOCUMENTED: ICD-10-PCS | Mod: CPTII,S$GLB,, | Performed by: FAMILY MEDICINE

## 2023-05-03 PROCEDURE — 3079F DIAST BP 80-89 MM HG: CPT | Mod: CPTII,S$GLB,, | Performed by: FAMILY MEDICINE

## 2023-05-03 PROCEDURE — 99214 OFFICE O/P EST MOD 30 MIN: CPT | Mod: 25,S$GLB,, | Performed by: FAMILY MEDICINE

## 2023-05-03 PROCEDURE — 99213 OFFICE O/P EST LOW 20 MIN: CPT | Mod: PBBFAC | Performed by: FAMILY MEDICINE

## 2023-05-03 PROCEDURE — 3079F PR MOST RECENT DIASTOLIC BLOOD PRESSURE 80-89 MM HG: ICD-10-PCS | Mod: CPTII,S$GLB,, | Performed by: FAMILY MEDICINE

## 2023-05-03 PROCEDURE — 1159F PR MEDICATION LIST DOCUMENTED IN MEDICAL RECORD: ICD-10-PCS | Mod: CPTII,S$GLB,, | Performed by: FAMILY MEDICINE

## 2023-05-03 PROCEDURE — 3008F BODY MASS INDEX DOCD: CPT | Mod: CPTII,S$GLB,, | Performed by: FAMILY MEDICINE

## 2023-05-03 PROCEDURE — 96372 THER/PROPH/DIAG INJ SC/IM: CPT | Mod: S$GLB,,, | Performed by: FAMILY MEDICINE

## 2023-05-03 PROCEDURE — 96372 THER/PROPH/DIAG INJ SC/IM: CPT | Mod: PBBFAC

## 2023-05-03 PROCEDURE — 1160F PR REVIEW ALL MEDS BY PRESCRIBER/CLIN PHARMACIST DOCUMENTED: ICD-10-PCS | Mod: CPTII,S$GLB,, | Performed by: FAMILY MEDICINE

## 2023-05-03 PROCEDURE — 4010F ACE/ARB THERAPY RXD/TAKEN: CPT | Mod: CPTII,S$GLB,, | Performed by: FAMILY MEDICINE

## 2023-05-03 PROCEDURE — 99999 PR PBB SHADOW E&M-EST. PATIENT-LVL III: CPT | Mod: PBBFAC,,, | Performed by: FAMILY MEDICINE

## 2023-05-03 PROCEDURE — 3044F PR MOST RECENT HEMOGLOBIN A1C LEVEL <7.0%: ICD-10-PCS | Mod: CPTII,S$GLB,, | Performed by: FAMILY MEDICINE

## 2023-05-03 PROCEDURE — 3074F PR MOST RECENT SYSTOLIC BLOOD PRESSURE < 130 MM HG: ICD-10-PCS | Mod: CPTII,S$GLB,, | Performed by: FAMILY MEDICINE

## 2023-05-03 RX ORDER — METHYLPREDNISOLONE ACETATE 40 MG/ML
80 INJECTION, SUSPENSION INTRA-ARTICULAR; INTRALESIONAL; INTRAMUSCULAR; SOFT TISSUE
Status: COMPLETED | OUTPATIENT
Start: 2023-05-03 | End: 2023-05-03

## 2023-05-03 RX ORDER — GABAPENTIN 300 MG/1
300 CAPSULE ORAL 3 TIMES DAILY
Qty: 90 CAPSULE | Refills: 5 | Status: SHIPPED | OUTPATIENT
Start: 2023-05-03 | End: 2023-11-27

## 2023-05-03 RX ORDER — QUETIAPINE FUMARATE 200 MG/1
TABLET, FILM COATED ORAL
Qty: 90 TABLET | Refills: 1 | Status: SHIPPED | OUTPATIENT
Start: 2023-05-03 | End: 2024-03-11 | Stop reason: SDUPTHER

## 2023-05-03 RX ORDER — KETOROLAC TROMETHAMINE 30 MG/ML
60 INJECTION, SOLUTION INTRAMUSCULAR; INTRAVENOUS
Status: COMPLETED | OUTPATIENT
Start: 2023-05-03 | End: 2023-05-03

## 2023-05-03 RX ADMIN — METHYLPREDNISOLONE ACETATE 80 MG: 40 INJECTION, SUSPENSION INTRA-ARTICULAR; INTRALESIONAL; INTRAMUSCULAR; SOFT TISSUE at 10:05

## 2023-05-03 RX ADMIN — KETOROLAC TROMETHAMINE 60 MG: 30 INJECTION, SOLUTION INTRAMUSCULAR at 10:05

## 2023-05-03 NOTE — PROGRESS NOTES
Subjective:       Patient ID: Gricelda Gil is a 63 y.o. female.    Chief Complaint: Back Pain (Pt states she is experiencing lower back pain that shoots down into her L leg. )    Pt is a 63 y.o. female who presents for evaluation and management of   Encounter Diagnoses   Name Primary?    DDD (degenerative disc disease), lumbar Yes    Lumbar facet arthropathy     Chronic left-sided low back pain with left-sided sciatica     Insomnia, unspecified type    .  Doing well on current meds. Denies any side effects. Prevention is up to date.  Review of Systems   Musculoskeletal:  Positive for back pain.   Neurological:  Positive for weakness and numbness.   Psychiatric/Behavioral:  Negative for dysphoric mood. The patient is not nervous/anxious.      Objective:      Physical Exam  Constitutional:       Appearance: She is well-developed. She is obese.   HENT:      Head: Normocephalic and atraumatic.      Right Ear: External ear normal.      Left Ear: External ear normal.      Nose: Nose normal.   Eyes:      Pupils: Pupils are equal, round, and reactive to light.   Neck:      Thyroid: No thyromegaly.      Vascular: No JVD.      Trachea: No tracheal deviation.   Cardiovascular:      Rate and Rhythm: Normal rate.   Pulmonary:      Effort: Pulmonary effort is normal. No respiratory distress.   Abdominal:      Palpations: Abdomen is soft.   Musculoskeletal:         General: No tenderness. Normal range of motion.      Cervical back: Normal range of motion and neck supple.   Lymphadenopathy:      Cervical: No cervical adenopathy.   Skin:     General: Skin is warm and dry.      Coloration: Skin is not pale.      Findings: No erythema or rash.   Neurological:      Mental Status: She is alert and oriented to person, place, and time.      Deep Tendon Reflexes: Reflexes normal.      Comments: Neg SLR      Psychiatric:         Behavior: Behavior normal.         Thought Content: Thought content normal.         Judgment: Judgment  normal.       Assessment:       1. DDD (degenerative disc disease), lumbar    2. Lumbar facet arthropathy    3. Chronic left-sided low back pain with left-sided sciatica    4. Insomnia, unspecified type        Plan:   1. DDD (degenerative disc disease), lumbar  -     Ambulatory referral/consult to Physical/Occupational Therapy; Future; Expected date: 05/10/2023  -     methylPREDNISolone acetate injection 80 mg  -     ketorolac injection 60 mg    2. Lumbar facet arthropathy  -     Ambulatory referral/consult to Physical/Occupational Therapy; Future; Expected date: 05/10/2023  -     methylPREDNISolone acetate injection 80 mg  -     ketorolac injection 60 mg    3. Chronic left-sided low back pain with left-sided sciatica  -     gabapentin (NEURONTIN) 300 MG capsule; Take 1 capsule (300 mg total) by mouth 3 (three) times daily.  Dispense: 90 capsule; Refill: 5  -     Ambulatory referral/consult to Physical/Occupational Therapy; Future; Expected date: 05/10/2023  -     methylPREDNISolone acetate injection 80 mg  -     ketorolac injection 60 mg    4. Insomnia, unspecified type  -     QUEtiapine (SEROQUEL) 200 MG Tab; TAKE ONE-HALF TABLET BY MOUTH NIGHTLY.  Dispense: 90 tablet; Refill: 1    Will start with above and plan for MRI if fails PT     No follow-ups on file.

## 2023-05-24 ENCOUNTER — OFFICE VISIT (OUTPATIENT)
Dept: FAMILY MEDICINE | Facility: CLINIC | Age: 63
End: 2023-05-24
Payer: MEDICARE

## 2023-05-24 VITALS
TEMPERATURE: 99 F | DIASTOLIC BLOOD PRESSURE: 82 MMHG | BODY MASS INDEX: 46.7 KG/M2 | HEART RATE: 84 BPM | SYSTOLIC BLOOD PRESSURE: 132 MMHG | OXYGEN SATURATION: 97 % | WEIGHT: 290.56 LBS | HEIGHT: 66 IN | RESPIRATION RATE: 15 BRPM

## 2023-05-24 DIAGNOSIS — J32.9 SINUSITIS, UNSPECIFIED CHRONICITY, UNSPECIFIED LOCATION: Primary | ICD-10-CM

## 2023-05-24 PROCEDURE — 99213 OFFICE O/P EST LOW 20 MIN: CPT | Mod: 25,S$GLB,, | Performed by: FAMILY MEDICINE

## 2023-05-24 PROCEDURE — 4010F ACE/ARB THERAPY RXD/TAKEN: CPT | Mod: CPTII,S$GLB,, | Performed by: FAMILY MEDICINE

## 2023-05-24 PROCEDURE — 99999 PR PBB SHADOW E&M-EST. PATIENT-LVL III: CPT | Mod: PBBFAC,,, | Performed by: FAMILY MEDICINE

## 2023-05-24 PROCEDURE — 3044F HG A1C LEVEL LT 7.0%: CPT | Mod: CPTII,S$GLB,, | Performed by: FAMILY MEDICINE

## 2023-05-24 PROCEDURE — 99213 PR OFFICE/OUTPT VISIT, EST, LEVL III, 20-29 MIN: ICD-10-PCS | Mod: 25,S$GLB,, | Performed by: FAMILY MEDICINE

## 2023-05-24 PROCEDURE — 4010F PR ACE/ARB THEARPY RXD/TAKEN: ICD-10-PCS | Mod: CPTII,S$GLB,, | Performed by: FAMILY MEDICINE

## 2023-05-24 PROCEDURE — 3075F PR MOST RECENT SYSTOLIC BLOOD PRESS GE 130-139MM HG: ICD-10-PCS | Mod: CPTII,S$GLB,, | Performed by: FAMILY MEDICINE

## 2023-05-24 PROCEDURE — 99999 PR PBB SHADOW E&M-EST. PATIENT-LVL III: ICD-10-PCS | Mod: PBBFAC,,, | Performed by: FAMILY MEDICINE

## 2023-05-24 PROCEDURE — 3079F DIAST BP 80-89 MM HG: CPT | Mod: CPTII,S$GLB,, | Performed by: FAMILY MEDICINE

## 2023-05-24 PROCEDURE — 1159F MED LIST DOCD IN RCRD: CPT | Mod: CPTII,S$GLB,, | Performed by: FAMILY MEDICINE

## 2023-05-24 PROCEDURE — 3044F PR MOST RECENT HEMOGLOBIN A1C LEVEL <7.0%: ICD-10-PCS | Mod: CPTII,S$GLB,, | Performed by: FAMILY MEDICINE

## 2023-05-24 PROCEDURE — 3008F BODY MASS INDEX DOCD: CPT | Mod: CPTII,S$GLB,, | Performed by: FAMILY MEDICINE

## 2023-05-24 PROCEDURE — 3008F PR BODY MASS INDEX (BMI) DOCUMENTED: ICD-10-PCS | Mod: CPTII,S$GLB,, | Performed by: FAMILY MEDICINE

## 2023-05-24 PROCEDURE — 3075F SYST BP GE 130 - 139MM HG: CPT | Mod: CPTII,S$GLB,, | Performed by: FAMILY MEDICINE

## 2023-05-24 PROCEDURE — 96372 THER/PROPH/DIAG INJ SC/IM: CPT | Mod: S$GLB,,, | Performed by: FAMILY MEDICINE

## 2023-05-24 PROCEDURE — 96372 PR INJECTION,THERAP/PROPH/DIAG2ST, IM OR SUBCUT: ICD-10-PCS | Mod: S$GLB,,, | Performed by: FAMILY MEDICINE

## 2023-05-24 PROCEDURE — 3079F PR MOST RECENT DIASTOLIC BLOOD PRESSURE 80-89 MM HG: ICD-10-PCS | Mod: CPTII,S$GLB,, | Performed by: FAMILY MEDICINE

## 2023-05-24 PROCEDURE — 1159F PR MEDICATION LIST DOCUMENTED IN MEDICAL RECORD: ICD-10-PCS | Mod: CPTII,S$GLB,, | Performed by: FAMILY MEDICINE

## 2023-05-24 RX ORDER — FLUCONAZOLE 150 MG/1
150 TABLET ORAL ONCE
Qty: 1 TABLET | Refills: 0 | Status: SHIPPED | OUTPATIENT
Start: 2023-05-24 | End: 2023-05-24

## 2023-05-24 RX ORDER — AZITHROMYCIN 500 MG/1
500 TABLET, FILM COATED ORAL DAILY
Qty: 3 TABLET | Refills: 0 | Status: SHIPPED | OUTPATIENT
Start: 2023-05-24 | End: 2023-05-27

## 2023-05-24 RX ORDER — METHYLPREDNISOLONE ACETATE 40 MG/ML
60 INJECTION, SUSPENSION INTRA-ARTICULAR; INTRALESIONAL; INTRAMUSCULAR; SOFT TISSUE
Status: COMPLETED | OUTPATIENT
Start: 2023-05-24 | End: 2023-05-24

## 2023-05-24 RX ADMIN — METHYLPREDNISOLONE ACETATE 60 MG: 40 INJECTION, SUSPENSION INTRA-ARTICULAR; INTRALESIONAL; INTRAMUSCULAR; SOFT TISSUE at 03:05

## 2023-05-24 NOTE — PROGRESS NOTES
Subjective:       Patient ID: Gricelda Gil is a 63 y.o. female.    Chief Complaint: URI (Pt states she has been having a sore throat. She states she has a runny nose and has been coughing. Pt states she has been very fatigue.)    Pt is 63 y.o. female who c/o 4 day h/o sinus congestion and h/a. Pos PND and cough.  No relief with OTC antihistamine/decongestant cold preparations. Severity is moderate.  Review of Systems   Constitutional:  Negative for fatigue and fever.   HENT:  Positive for ear discharge, ear pain, postnasal drip, rhinorrhea, sinus pressure and sore throat. Negative for congestion, drooling and trouble swallowing.    Eyes:  Positive for itching.   Respiratory:  Positive for cough and stridor. Negative for shortness of breath and wheezing.    Cardiovascular: Negative.  Negative for chest pain and palpitations.   Gastrointestinal: Negative.  Negative for abdominal pain, diarrhea, nausea and vomiting.   Genitourinary: Negative.    Musculoskeletal: Negative.  Negative for neck pain.   Skin: Negative.    Neurological:  Positive for headaches.   Psychiatric/Behavioral: Negative.       Objective:    Physical Exam  Constitutional:       Appearance: She is well-developed.   HENT:      Head: Normocephalic and atraumatic.      Right Ear: External ear normal.      Left Ear: External ear normal.      Nose: Nose normal.   Eyes:      Pupils: Pupils are equal, round, and reactive to light.   Neck:      Thyroid: No thyromegaly.      Vascular: No JVD.      Trachea: No tracheal deviation.   Cardiovascular:      Rate and Rhythm: Normal rate.      Heart sounds: Normal heart sounds. No murmur heard.  Pulmonary:      Effort: Pulmonary effort is normal. No respiratory distress.      Breath sounds: Normal breath sounds. No wheezing or rales.   Chest:      Chest wall: No tenderness.   Abdominal:      General: Bowel sounds are normal. There is no distension.      Palpations: Abdomen is soft. There is no mass.       Tenderness: There is no abdominal tenderness. There is no guarding or rebound.   Musculoskeletal:         General: No tenderness. Normal range of motion.      Cervical back: Normal range of motion and neck supple.   Lymphadenopathy:      Cervical: No cervical adenopathy.   Skin:     General: Skin is warm and dry.      Coloration: Skin is not pale.      Findings: No erythema or rash.   Neurological:      Mental Status: She is alert and oriented to person, place, and time.      Cranial Nerves: No cranial nerve deficit.      Motor: No abnormal muscle tone.      Coordination: Coordination normal.      Deep Tendon Reflexes: Reflexes are normal and symmetric. Reflexes normal.   Psychiatric:         Behavior: Behavior normal.         Thought Content: Thought content normal.         Judgment: Judgment normal.       Assessment:       1. Sinusitis, unspecified chronicity, unspecified location        Plan:   Gricelda was seen today for uri.    Diagnoses and all orders for this visit:    Sinusitis, unspecified chronicity, unspecified location  -     azithromycin (ZITHROMAX) 500 MG tablet; Take 1 tablet (500 mg total) by mouth once daily. for 3 days  -     fluconazole (DIFLUCAN) 150 MG Tab; Take 1 tablet (150 mg total) by mouth once. for 1 dose  -     methylPREDNISolone acetate injection 60 mg      No follow-ups on file.      Answers submitted by the patient for this visit:  Sore Throat Questionnaire (Submitted on 5/23/2023)  Chief Complaint: Sore throat  Chronicity: new  Onset: in the past 7 days  Progression since onset: gradually worsening  Fever: 100 - 100.9 F  Fever duration: 1 to 2 days  Pain - numeric: 6/10  hoarse voice: Yes  plugged ear sensation: No  swollen glands: No  strep: No  mono: No  Treatments tried: NSAIDs  Improvement on treatment: mild  Pain severity: moderate

## 2023-05-29 ENCOUNTER — TELEPHONE (OUTPATIENT)
Dept: FAMILY MEDICINE | Facility: CLINIC | Age: 63
End: 2023-05-29
Payer: MEDICARE

## 2023-05-29 NOTE — TELEPHONE ENCOUNTER
----- Message from Leodan Walters sent at 2023 11:19 AM CDT -----  Contact: Luz @ [facility]  Gricelda Gil  MRN: [id number]  : 1960  PCP: Major Kimble  Home Phone      [phone]  Work Phone      Not on file.  Mobile          [phone]      MESSAGE: [facility] --- they have scheduled patient for eval 3 times -- patient has no showed every time -- they will not be rescheduling her    PCP: Lamin

## 2023-06-26 ENCOUNTER — PATIENT OUTREACH (OUTPATIENT)
Dept: ADMINISTRATIVE | Facility: HOSPITAL | Age: 63
End: 2023-06-26
Payer: MEDICARE

## 2023-06-26 NOTE — PROGRESS NOTES
Chart reviewed, immunization record updated.  No new results noted on [facility] or [facility] web site.  Care Everywhere unavailable.  Patient care coordination note  Upcoming PCP visit updated.  Next PCP visit 7/24/2023.  Left detailed message for patient to return call to discuss scheduling Colonoscopy,  noted positive Fit Kit on 11/15/2022.

## 2023-07-31 ENCOUNTER — PATIENT OUTREACH (OUTPATIENT)
Dept: ADMINISTRATIVE | Facility: HOSPITAL | Age: 63
End: 2023-07-31
Payer: MEDICARE

## 2023-07-31 NOTE — PROGRESS NOTES
Chart reviewed, immunization record updated.  No new results noted on [facility] or [facility] web site.  Care Everywhere updated.   Patient care coordination note  Upcoming PCP visit updated.  Next PCP visit 8/14/2023.  Left detailed message for patient to return call to discuss scheduling Colonoscopy,  noted positive Fit Kit on 11/15/2022.

## 2023-08-09 ENCOUNTER — PATIENT MESSAGE (OUTPATIENT)
Dept: ADMINISTRATIVE | Facility: OTHER | Age: 63
End: 2023-08-09
Payer: MEDICARE

## 2023-08-14 ENCOUNTER — PATIENT MESSAGE (OUTPATIENT)
Dept: ADMINISTRATIVE | Facility: OTHER | Age: 63
End: 2023-08-14
Payer: MEDICARE

## 2023-08-14 ENCOUNTER — CLINICAL SUPPORT (OUTPATIENT)
Dept: FAMILY MEDICINE | Facility: CLINIC | Age: 63
End: 2023-08-14
Payer: MEDICARE

## 2023-08-14 ENCOUNTER — OFFICE VISIT (OUTPATIENT)
Dept: FAMILY MEDICINE | Facility: CLINIC | Age: 63
End: 2023-08-14
Payer: MEDICARE

## 2023-08-14 VITALS
BODY MASS INDEX: 44.68 KG/M2 | OXYGEN SATURATION: 94 % | RESPIRATION RATE: 16 BRPM | SYSTOLIC BLOOD PRESSURE: 126 MMHG | HEART RATE: 78 BPM | WEIGHT: 278 LBS | DIASTOLIC BLOOD PRESSURE: 84 MMHG | HEIGHT: 66 IN

## 2023-08-14 DIAGNOSIS — R73.02 IMPAIRED GLUCOSE TOLERANCE: ICD-10-CM

## 2023-08-14 DIAGNOSIS — F41.9 ANXIETY: ICD-10-CM

## 2023-08-14 DIAGNOSIS — G47.00 INSOMNIA, UNSPECIFIED TYPE: ICD-10-CM

## 2023-08-14 DIAGNOSIS — I10 PRIMARY HYPERTENSION: ICD-10-CM

## 2023-08-14 DIAGNOSIS — E78.5 DYSLIPIDEMIA: ICD-10-CM

## 2023-08-14 DIAGNOSIS — E03.9 HYPOTHYROIDISM, UNSPECIFIED TYPE: ICD-10-CM

## 2023-08-14 DIAGNOSIS — H66.90 OTITIS MEDIA, UNSPECIFIED LATERALITY, UNSPECIFIED OTITIS MEDIA TYPE: ICD-10-CM

## 2023-08-14 DIAGNOSIS — Z12.31 ENCOUNTER FOR SCREENING MAMMOGRAM FOR MALIGNANT NEOPLASM OF BREAST: ICD-10-CM

## 2023-08-14 DIAGNOSIS — Z12.39 ENCOUNTER FOR SCREENING FOR MALIGNANT NEOPLASM OF BREAST, UNSPECIFIED SCREENING MODALITY: Primary | ICD-10-CM

## 2023-08-14 DIAGNOSIS — Z12.11 SCREEN FOR COLON CANCER: ICD-10-CM

## 2023-08-14 DIAGNOSIS — E66.01 MORBID OBESITY DUE TO EXCESS CALORIES: ICD-10-CM

## 2023-08-14 PROCEDURE — 4010F PR ACE/ARB THEARPY RXD/TAKEN: ICD-10-PCS | Mod: CPTII,S$GLB,, | Performed by: FAMILY MEDICINE

## 2023-08-14 PROCEDURE — 36415 PR COLLECTION VENOUS BLOOD,VENIPUNCTURE: ICD-10-PCS | Mod: S$GLB,,, | Performed by: FAMILY MEDICINE

## 2023-08-14 PROCEDURE — 3008F BODY MASS INDEX DOCD: CPT | Mod: CPTII,S$GLB,, | Performed by: FAMILY MEDICINE

## 2023-08-14 PROCEDURE — 4010F ACE/ARB THERAPY RXD/TAKEN: CPT | Mod: CPTII,S$GLB,, | Performed by: FAMILY MEDICINE

## 2023-08-14 PROCEDURE — 3079F PR MOST RECENT DIASTOLIC BLOOD PRESSURE 80-89 MM HG: ICD-10-PCS | Mod: CPTII,S$GLB,, | Performed by: FAMILY MEDICINE

## 2023-08-14 PROCEDURE — 99214 OFFICE O/P EST MOD 30 MIN: CPT | Mod: S$GLB,,, | Performed by: FAMILY MEDICINE

## 2023-08-14 PROCEDURE — 3044F PR MOST RECENT HEMOGLOBIN A1C LEVEL <7.0%: ICD-10-PCS | Mod: CPTII,S$GLB,, | Performed by: FAMILY MEDICINE

## 2023-08-14 PROCEDURE — 3079F DIAST BP 80-89 MM HG: CPT | Mod: CPTII,S$GLB,, | Performed by: FAMILY MEDICINE

## 2023-08-14 PROCEDURE — 1159F PR MEDICATION LIST DOCUMENTED IN MEDICAL RECORD: ICD-10-PCS | Mod: CPTII,S$GLB,, | Performed by: FAMILY MEDICINE

## 2023-08-14 PROCEDURE — 1159F MED LIST DOCD IN RCRD: CPT | Mod: CPTII,S$GLB,, | Performed by: FAMILY MEDICINE

## 2023-08-14 PROCEDURE — 80053 COMPREHEN METABOLIC PANEL: CPT | Performed by: FAMILY MEDICINE

## 2023-08-14 PROCEDURE — 3074F SYST BP LT 130 MM HG: CPT | Mod: CPTII,S$GLB,, | Performed by: FAMILY MEDICINE

## 2023-08-14 PROCEDURE — 99999 PR PBB SHADOW E&M-EST. PATIENT-LVL III: CPT | Mod: PBBFAC,,, | Performed by: FAMILY MEDICINE

## 2023-08-14 PROCEDURE — 85025 COMPLETE CBC W/AUTO DIFF WBC: CPT | Performed by: FAMILY MEDICINE

## 2023-08-14 PROCEDURE — 3044F HG A1C LEVEL LT 7.0%: CPT | Mod: CPTII,S$GLB,, | Performed by: FAMILY MEDICINE

## 2023-08-14 PROCEDURE — 3074F PR MOST RECENT SYSTOLIC BLOOD PRESSURE < 130 MM HG: ICD-10-PCS | Mod: CPTII,S$GLB,, | Performed by: FAMILY MEDICINE

## 2023-08-14 PROCEDURE — 99214 PR OFFICE/OUTPT VISIT, EST, LEVL IV, 30-39 MIN: ICD-10-PCS | Mod: S$GLB,,, | Performed by: FAMILY MEDICINE

## 2023-08-14 PROCEDURE — 83036 HEMOGLOBIN GLYCOSYLATED A1C: CPT | Performed by: FAMILY MEDICINE

## 2023-08-14 PROCEDURE — 99999 PR PBB SHADOW E&M-EST. PATIENT-LVL III: ICD-10-PCS | Mod: PBBFAC,,, | Performed by: FAMILY MEDICINE

## 2023-08-14 PROCEDURE — 3008F PR BODY MASS INDEX (BMI) DOCUMENTED: ICD-10-PCS | Mod: CPTII,S$GLB,, | Performed by: FAMILY MEDICINE

## 2023-08-14 PROCEDURE — 36415 COLL VENOUS BLD VENIPUNCTURE: CPT | Mod: S$GLB,,, | Performed by: FAMILY MEDICINE

## 2023-08-14 RX ORDER — PREDNISOLONE ACETATE 10 MG/ML
SUSPENSION/ DROPS OPHTHALMIC
COMMUNITY
Start: 2023-06-13 | End: 2023-11-27

## 2023-08-14 RX ORDER — OFLOXACIN 3 MG/ML
SOLUTION/ DROPS OPHTHALMIC
COMMUNITY
Start: 2023-06-13 | End: 2023-11-27

## 2023-08-14 RX ORDER — OFLOXACIN 3 MG/ML
5 SOLUTION AURICULAR (OTIC) 2 TIMES DAILY
Qty: 10 ML | Refills: 0 | Status: SHIPPED | OUTPATIENT
Start: 2023-08-14 | End: 2023-08-24

## 2023-08-14 RX ORDER — OMEPRAZOLE 40 MG/1
40 CAPSULE, DELAYED RELEASE ORAL
COMMUNITY
Start: 2023-06-13 | End: 2024-03-11

## 2023-08-14 NOTE — PROGRESS NOTES
Subjective:       Patient ID: Gricelda Gil is a 63 y.o. female.    Chief Complaint: Follow-up (6m f/u/Pt states she would like for left ear to be exam)    Pt is a 63 y.o. female who presents for evaluation and management of   Encounter Diagnoses   Name Primary?    Encounter for screening for malignant neoplasm of breast, unspecified screening modality Yes    Encounter for screening mammogram for malignant neoplasm of breast     Insomnia, unspecified type     Anxiety     Hypothyroidism, unspecified type     Primary hypertension     Dyslipidemia     Morbid obesity due to excess calories     Impaired glucose tolerance    .  Doing well on current meds. Denies any side effects. Prevention is up to date.  Review of Systems   Constitutional:  Negative for activity change and unexpected weight change.   HENT:  Negative for hearing loss, rhinorrhea and trouble swallowing.    Eyes:  Positive for visual disturbance. Negative for discharge.   Respiratory:  Negative for chest tightness and wheezing.    Cardiovascular:  Negative for chest pain and palpitations.   Gastrointestinal:  Negative for blood in stool, constipation, diarrhea and vomiting.   Endocrine: Negative for polydipsia and polyuria.   Genitourinary:  Negative for difficulty urinating, dysuria, hematuria and menstrual problem.   Musculoskeletal:  Positive for arthralgias and joint swelling. Negative for neck pain.   Neurological:  Negative for weakness and headaches.   Psychiatric/Behavioral:  Positive for dysphoric mood. Negative for confusion.        Objective:      Physical Exam  Constitutional:       Appearance: She is well-developed. She is obese.   HENT:      Head: Normocephalic and atraumatic.      Right Ear: External ear normal.      Left Ear: External ear normal.      Nose: Nose normal.   Eyes:      Pupils: Pupils are equal, round, and reactive to light.   Neck:      Thyroid: No thyromegaly.      Vascular: No JVD.      Trachea: No tracheal  deviation.   Cardiovascular:      Rate and Rhythm: Normal rate.      Heart sounds: Normal heart sounds. No murmur heard.  Pulmonary:      Effort: Pulmonary effort is normal. No respiratory distress.      Breath sounds: Normal breath sounds. No wheezing or rales.   Chest:      Chest wall: No tenderness.   Abdominal:      General: Bowel sounds are normal. There is no distension.      Palpations: Abdomen is soft. There is no mass.      Tenderness: There is no abdominal tenderness. There is no guarding or rebound.   Musculoskeletal:         General: No tenderness. Normal range of motion.      Cervical back: Normal range of motion and neck supple.   Lymphadenopathy:      Cervical: No cervical adenopathy.   Skin:     General: Skin is warm and dry.      Coloration: Skin is not pale.      Findings: No erythema or rash.   Neurological:      Mental Status: She is alert and oriented to person, place, and time.      Cranial Nerves: No cranial nerve deficit.      Motor: No abnormal muscle tone.      Coordination: Coordination normal.      Deep Tendon Reflexes: Reflexes are normal and symmetric. Reflexes normal.   Psychiatric:         Behavior: Behavior normal.         Thought Content: Thought content normal.         Judgment: Judgment normal.         Assessment:       1. Encounter for screening for malignant neoplasm of breast, unspecified screening modality    2. Encounter for screening mammogram for malignant neoplasm of breast    3. Insomnia, unspecified type    4. Anxiety    5. Hypothyroidism, unspecified type    6. Primary hypertension    7. Dyslipidemia    8. Morbid obesity due to excess calories    9. Impaired glucose tolerance        Plan:   1. Encounter for screening for malignant neoplasm of breast, unspecified screening modality  -     Mammo Digital Screening Bilat w/ Reinaldo; Future; Expected date: 08/14/2023    2. Encounter for screening mammogram for malignant neoplasm of breast  -     Mammo Digital Screening Bilat w/  Reinaldo; Future; Expected date: 08/14/2023    3. Insomnia, unspecified type  Overview:  seroquel 200mg works well   Continue         4. Anxiety  Overview:  Continue duloxetine   Klonopin 0.5mg daily---she will try to wean       5. Hypothyroidism, unspecified type  Overview:  Lab Results   Component Value Date    TSH 1.990 01/23/2023     Synthroid 150           6. Primary hypertension  Overview:  lotrel 40/5       Orders:  -     CBC Auto Differential; Future; Expected date: 08/14/2023    7. Dyslipidemia  Overview:  Lab Results   Component Value Date    LDLCALC 96.8 01/23/2023   lipitor 40         Orders:  -     Comprehensive Metabolic Panel; Future; Expected date: 08/14/2023  -     Hemoglobin A1C; Future; Expected date: 08/14/2023    8. Morbid obesity due to excess calories  Overview:  The patient is asked to make an attempt to improve diet and exercise patterns to aid in medical management of this problem.  Cut out white carbs: bread, rice, pasta, potatoes. Exercise/walk 5x/week for at least 30 minutes  .        9. Impaired glucose tolerance  -     CBC Auto Differential; Future; Expected date: 08/14/2023  -     Comprehensive Metabolic Panel; Future; Expected date: 08/14/2023  -     Hemoglobin A1C; Future; Expected date: 08/14/2023      RTC 6 months       No follow-ups on file.

## 2023-08-15 LAB
ALBUMIN SERPL BCP-MCNC: 3.5 G/DL (ref 3.5–5.2)
ALP SERPL-CCNC: 141 U/L (ref 55–135)
ALT SERPL W/O P-5'-P-CCNC: 13 U/L (ref 10–44)
ANION GAP SERPL CALC-SCNC: 11 MMOL/L (ref 8–16)
AST SERPL-CCNC: 18 U/L (ref 10–40)
BASOPHILS # BLD AUTO: 0.13 K/UL (ref 0–0.2)
BASOPHILS NFR BLD: 1.5 % (ref 0–1.9)
BILIRUB SERPL-MCNC: 0.7 MG/DL (ref 0.1–1)
BUN SERPL-MCNC: 10 MG/DL (ref 8–23)
CALCIUM SERPL-MCNC: 9 MG/DL (ref 8.7–10.5)
CHLORIDE SERPL-SCNC: 104 MMOL/L (ref 95–110)
CO2 SERPL-SCNC: 25 MMOL/L (ref 23–29)
CREAT SERPL-MCNC: 0.9 MG/DL (ref 0.5–1.4)
DIFFERENTIAL METHOD: NORMAL
EOSINOPHIL # BLD AUTO: 0.3 K/UL (ref 0–0.5)
EOSINOPHIL NFR BLD: 3.7 % (ref 0–8)
ERYTHROCYTE [DISTWIDTH] IN BLOOD BY AUTOMATED COUNT: 14 % (ref 11.5–14.5)
EST. GFR  (NO RACE VARIABLE): >60 ML/MIN/1.73 M^2
ESTIMATED AVG GLUCOSE: 114 MG/DL (ref 68–131)
GLUCOSE SERPL-MCNC: 78 MG/DL (ref 70–110)
HBA1C MFR BLD: 5.6 % (ref 4–5.6)
HCT VFR BLD AUTO: 41.4 % (ref 37–48.5)
HGB BLD-MCNC: 13.4 G/DL (ref 12–16)
IMM GRANULOCYTES # BLD AUTO: 0.04 K/UL (ref 0–0.04)
IMM GRANULOCYTES NFR BLD AUTO: 0.5 % (ref 0–0.5)
LYMPHOCYTES # BLD AUTO: 2.2 K/UL (ref 1–4.8)
LYMPHOCYTES NFR BLD: 25 % (ref 18–48)
MCH RBC QN AUTO: 28.3 PG (ref 27–31)
MCHC RBC AUTO-ENTMCNC: 32.4 G/DL (ref 32–36)
MCV RBC AUTO: 88 FL (ref 82–98)
MONOCYTES # BLD AUTO: 1 K/UL (ref 0.3–1)
MONOCYTES NFR BLD: 12 % (ref 4–15)
NEUTROPHILS # BLD AUTO: 4.9 K/UL (ref 1.8–7.7)
NEUTROPHILS NFR BLD: 57.3 % (ref 38–73)
NRBC BLD-RTO: 0 /100 WBC
PLATELET # BLD AUTO: 334 K/UL (ref 150–450)
PMV BLD AUTO: 11.1 FL (ref 9.2–12.9)
POTASSIUM SERPL-SCNC: 4 MMOL/L (ref 3.5–5.1)
PROT SERPL-MCNC: 7 G/DL (ref 6–8.4)
RBC # BLD AUTO: 4.73 M/UL (ref 4–5.4)
SODIUM SERPL-SCNC: 140 MMOL/L (ref 136–145)
WBC # BLD AUTO: 8.59 K/UL (ref 3.9–12.7)

## 2023-09-02 LAB — NONINV COLON CA DNA+OCC BLD SCRN STL QL: NEGATIVE

## 2023-11-24 ENCOUNTER — PATIENT OUTREACH (OUTPATIENT)
Dept: ADMINISTRATIVE | Facility: HOSPITAL | Age: 63
End: 2023-11-24
Payer: MEDICARE

## 2023-11-24 NOTE — PROGRESS NOTES
Received external PHN, enhanced annual wellness visit collected/completed on 10/26/2023, updated to media.

## 2023-11-27 ENCOUNTER — APPOINTMENT (OUTPATIENT)
Dept: RADIOLOGY | Facility: CLINIC | Age: 63
End: 2023-11-27
Attending: FAMILY MEDICINE
Payer: MEDICARE

## 2023-11-27 ENCOUNTER — OFFICE VISIT (OUTPATIENT)
Dept: FAMILY MEDICINE | Facility: CLINIC | Age: 63
End: 2023-11-27
Payer: MEDICARE

## 2023-11-27 VITALS
BODY MASS INDEX: 44.65 KG/M2 | HEIGHT: 65 IN | SYSTOLIC BLOOD PRESSURE: 106 MMHG | RESPIRATION RATE: 20 BRPM | HEART RATE: 72 BPM | WEIGHT: 268 LBS | DIASTOLIC BLOOD PRESSURE: 72 MMHG

## 2023-11-27 DIAGNOSIS — M54.6 ACUTE RIGHT-SIDED THORACIC BACK PAIN: ICD-10-CM

## 2023-11-27 DIAGNOSIS — M54.50 ACUTE RIGHT-SIDED LOW BACK PAIN WITHOUT SCIATICA: Primary | ICD-10-CM

## 2023-11-27 DIAGNOSIS — M54.50 ACUTE RIGHT-SIDED LOW BACK PAIN WITHOUT SCIATICA: ICD-10-CM

## 2023-11-27 PROCEDURE — 4010F PR ACE/ARB THEARPY RXD/TAKEN: ICD-10-PCS | Mod: CPTII,S$GLB,, | Performed by: FAMILY MEDICINE

## 2023-11-27 PROCEDURE — 72070 XR THORACIC SPINE AP LATERAL: ICD-10-PCS | Mod: 26,,, | Performed by: RADIOLOGY

## 2023-11-27 PROCEDURE — 3078F PR MOST RECENT DIASTOLIC BLOOD PRESSURE < 80 MM HG: ICD-10-PCS | Mod: CPTII,S$GLB,, | Performed by: FAMILY MEDICINE

## 2023-11-27 PROCEDURE — 99999 PR PBB SHADOW E&M-EST. PATIENT-LVL III: ICD-10-PCS | Mod: PBBFAC,,, | Performed by: FAMILY MEDICINE

## 2023-11-27 PROCEDURE — 1159F MED LIST DOCD IN RCRD: CPT | Mod: CPTII,S$GLB,, | Performed by: FAMILY MEDICINE

## 2023-11-27 PROCEDURE — 3074F PR MOST RECENT SYSTOLIC BLOOD PRESSURE < 130 MM HG: ICD-10-PCS | Mod: CPTII,S$GLB,, | Performed by: FAMILY MEDICINE

## 2023-11-27 PROCEDURE — 3078F DIAST BP <80 MM HG: CPT | Mod: CPTII,S$GLB,, | Performed by: FAMILY MEDICINE

## 2023-11-27 PROCEDURE — 99214 PR OFFICE/OUTPT VISIT, EST, LEVL IV, 30-39 MIN: ICD-10-PCS | Mod: S$GLB,,, | Performed by: FAMILY MEDICINE

## 2023-11-27 PROCEDURE — 1159F PR MEDICATION LIST DOCUMENTED IN MEDICAL RECORD: ICD-10-PCS | Mod: CPTII,S$GLB,, | Performed by: FAMILY MEDICINE

## 2023-11-27 PROCEDURE — 72070 X-RAY EXAM THORAC SPINE 2VWS: CPT | Mod: TC,PO

## 2023-11-27 PROCEDURE — 99999 PR PBB SHADOW E&M-EST. PATIENT-LVL III: CPT | Mod: PBBFAC,,, | Performed by: FAMILY MEDICINE

## 2023-11-27 PROCEDURE — 3044F HG A1C LEVEL LT 7.0%: CPT | Mod: CPTII,S$GLB,, | Performed by: FAMILY MEDICINE

## 2023-11-27 PROCEDURE — 3008F PR BODY MASS INDEX (BMI) DOCUMENTED: ICD-10-PCS | Mod: CPTII,S$GLB,, | Performed by: FAMILY MEDICINE

## 2023-11-27 PROCEDURE — 99214 OFFICE O/P EST MOD 30 MIN: CPT | Mod: S$GLB,,, | Performed by: FAMILY MEDICINE

## 2023-11-27 PROCEDURE — 3044F PR MOST RECENT HEMOGLOBIN A1C LEVEL <7.0%: ICD-10-PCS | Mod: CPTII,S$GLB,, | Performed by: FAMILY MEDICINE

## 2023-11-27 PROCEDURE — 3008F BODY MASS INDEX DOCD: CPT | Mod: CPTII,S$GLB,, | Performed by: FAMILY MEDICINE

## 2023-11-27 PROCEDURE — 72070 X-RAY EXAM THORAC SPINE 2VWS: CPT | Mod: 26,,, | Performed by: RADIOLOGY

## 2023-11-27 PROCEDURE — 3074F SYST BP LT 130 MM HG: CPT | Mod: CPTII,S$GLB,, | Performed by: FAMILY MEDICINE

## 2023-11-27 PROCEDURE — 4010F ACE/ARB THERAPY RXD/TAKEN: CPT | Mod: CPTII,S$GLB,, | Performed by: FAMILY MEDICINE

## 2023-11-27 RX ORDER — CYCLOBENZAPRINE HCL 10 MG
10 TABLET ORAL 3 TIMES DAILY PRN
Qty: 45 TABLET | Refills: 2 | Status: SHIPPED | OUTPATIENT
Start: 2023-11-27 | End: 2023-12-27

## 2023-11-27 NOTE — PROGRESS NOTES
Subjective:       Patient ID: Gricelda Gil is a 63 y.o. female.    Chief Complaint: Back Pain (Patient states she has been having pain in the muscle that runs along the spine in the back on the right side for about a month now)    Pt is a 63 y.o. female who presents for evaluation and management of   Encounter Diagnoses   Name Primary?    Acute right-sided low back pain without sciatica Yes    Acute right-sided thoracic back pain    .1 month   No trauma   Tried heat/cold/aleve/tylenol/motrin with no relief   No LE symptoms     Doing well on current meds. Denies any side effects. Prevention is up to date.    Review of Systems   Constitutional:  Negative for fever.   Cardiovascular:  Negative for chest pain.   Gastrointestinal:  Negative for abdominal pain.   Genitourinary:  Negative for dysuria, hematuria and pelvic pain.   Musculoskeletal:  Positive for back pain.   Neurological:  Positive for weakness. Negative for numbness and headaches.       Objective:      Physical Exam  Constitutional:       Appearance: She is well-developed.   HENT:      Head: Normocephalic and atraumatic.      Right Ear: External ear normal.      Left Ear: External ear normal.      Nose: Nose normal.   Eyes:      Pupils: Pupils are equal, round, and reactive to light.   Neck:      Thyroid: No thyromegaly.      Vascular: No JVD.      Trachea: No tracheal deviation.   Cardiovascular:      Rate and Rhythm: Normal rate.      Heart sounds: Normal heart sounds. No murmur heard.  Pulmonary:      Effort: Pulmonary effort is normal. No respiratory distress.      Breath sounds: Normal breath sounds. No wheezing or rales.   Chest:      Chest wall: No tenderness.   Abdominal:      General: Bowel sounds are normal. There is no distension.      Palpations: Abdomen is soft. There is no mass.      Tenderness: There is no abdominal tenderness. There is no guarding or rebound.   Musculoskeletal:         General: No tenderness. Normal range of  motion.      Cervical back: Normal range of motion and neck supple.   Lymphadenopathy:      Cervical: No cervical adenopathy.   Skin:     General: Skin is warm and dry.      Coloration: Skin is not pale.      Findings: No erythema or rash.   Neurological:      Mental Status: She is alert and oriented to person, place, and time.      Cranial Nerves: No cranial nerve deficit.      Motor: No abnormal muscle tone.      Coordination: Coordination normal.      Deep Tendon Reflexes: Reflexes are normal and symmetric. Reflexes normal.   Psychiatric:         Behavior: Behavior normal.         Thought Content: Thought content normal.         Judgment: Judgment normal.         Assessment:       1. Acute right-sided low back pain without sciatica    2. Acute right-sided thoracic back pain        Plan:   1. Acute right-sided low back pain without sciatica  -     X-Ray Thoracic Spine AP Lateral; Future; Expected date: 11/27/2023  -     X-Ray Lumbar Spine AP And Lateral; Future; Expected date: 11/27/2023  -     cyclobenzaprine (FLEXERIL) 10 MG tablet; Take 1 tablet (10 mg total) by mouth 3 (three) times daily as needed for Muscle spasms.  Dispense: 45 tablet; Refill: 2    2. Acute right-sided thoracic back pain  -     X-Ray Thoracic Spine AP Lateral; Future; Expected date: 11/27/2023  -     X-Ray Lumbar Spine AP And Lateral; Future; Expected date: 11/27/2023  -     cyclobenzaprine (FLEXERIL) 10 MG tablet; Take 1 tablet (10 mg total) by mouth 3 (three) times daily as needed for Muscle spasms.  Dispense: 45 tablet; Refill: 2    Xray with scoliosis, DJD, facet arthropathy \  Failed NSAIDs. Refuses PT. Trying above   Consider pain mngt referral   No follow-ups on file.

## 2023-12-29 DIAGNOSIS — F41.1 GAD (GENERALIZED ANXIETY DISORDER): ICD-10-CM

## 2023-12-29 RX ORDER — CLONAZEPAM 0.5 MG/1
0.5 TABLET ORAL 2 TIMES DAILY
Qty: 60 TABLET | Refills: 0 | Status: SHIPPED | OUTPATIENT
Start: 2023-12-29 | End: 2024-03-11 | Stop reason: SDUPTHER

## 2023-12-29 NOTE — TELEPHONE ENCOUNTER
No care due was identified.  [facility] Embedded Care Due Messages. Reference number: [id number].   12/29/2023 12:56:40 PM CST

## 2023-12-29 NOTE — TELEPHONE ENCOUNTER
Fax received from [facility] about refill on Clonazepam 0.5mg    Please advise   LOV 11/27/2023

## 2024-01-02 DIAGNOSIS — E78.5 DYSLIPIDEMIA: ICD-10-CM

## 2024-01-02 DIAGNOSIS — I10 ESSENTIAL HYPERTENSION: ICD-10-CM

## 2024-01-02 NOTE — TELEPHONE ENCOUNTER
No care due was identified.  [facility] Embedded Care Due Messages. Reference number: [id number].   1/02/2024 1:28:07 PM CST

## 2024-01-03 ENCOUNTER — TELEPHONE (OUTPATIENT)
Dept: RESEARCH | Facility: OTHER | Age: 64
End: 2024-01-03
Payer: MEDICARE

## 2024-01-03 RX ORDER — AMLODIPINE AND BENAZEPRIL HYDROCHLORIDE 5; 40 MG/1; MG/1
1 CAPSULE ORAL DAILY
Qty: 90 CAPSULE | Refills: 2 | Status: SHIPPED | OUTPATIENT
Start: 2024-01-03

## 2024-01-03 RX ORDER — ATORVASTATIN CALCIUM 40 MG/1
40 TABLET, FILM COATED ORAL
Qty: 90 TABLET | Refills: 0 | Status: SHIPPED | OUTPATIENT
Start: 2024-01-03

## 2024-01-03 NOTE — TELEPHONE ENCOUNTER
Study Title: Transcending COVID-19 barriers to pain care in [place]: Pragmatic comparative effectiveness trial of evidence-based, on-demand, digital behavioral treatments for chronic pain.  Sponsor:  [facility] [place]   Study: [facility] [place] Digital Pain Treatment Study - Transcending COVID-19 barriers to pain care in [place]: Pragmatic comparative effectiveness trial of evidence-based, on-demand, digital behavioral treatments for chronic pain.   IRB/Protocol #: 2021.177 - Phase 2?  Study#([facility]):  [id number]  Principle Investigator - Bebeto Barger   Sub-Investigator - Benji Quiroz   Sponsor:  [facility] Screening Interest in Study Call    Attempt #: 1, 2, 3+: 1      1. Contact Made: []Yes [x]No   1A. If yes, contact date:   1B. If no, date of final contact attempt:   1C. If no, reason(s) contact not made:  []Wrong number []No response [x]Other   1D. If other, please specify: left voicemail    2. Verbal commitment: []Yes  [x]No  2A. If yes, verbal commitment date:   2B. If no, reason: []Exclusion Criteria Met  []Desire not to participate []No reason provided [x]Other  2B1. If Type of Exclusion Criteria Met or other reason, specify: left voicemail    I left a detailed message for the subject to call the office back at [phone]. This is my first attempt to reach the subject.

## 2024-01-03 NOTE — TELEPHONE ENCOUNTER
Refill Decision Note   Gricelda Jeffery  is requesting a refill authorization.  Brief Assessment and Rationale for Refill:  Approve     Medication Therapy Plan:         Comments:     Note composed:7:18 AM 01/03/2024

## 2024-01-22 ENCOUNTER — TELEPHONE (OUTPATIENT)
Dept: RESEARCH | Facility: OTHER | Age: 64
End: 2024-01-22
Payer: MEDICARE

## 2024-01-22 NOTE — TELEPHONE ENCOUNTER
Study Title: Transcending COVID-19 barriers to pain care in [place]: Pragmatic comparative effectiveness trial of evidence-based, on-demand, digital behavioral treatments for chronic pain.  Sponsor:  [facility]   Study: [facility] Digital Pain Treatment Study - Transcending COVID-19 barriers to pain care in [place]: Pragmatic comparative effectiveness trial of evidence-based, on-demand, digital behavioral treatments for chronic pain.   IRB/Protocol #: 2021.177 - Phase 2?  Study#([facility]):  [id number]  Principle Investigator - Bebeto Barger   Sub-Investigator - Benji Quiroz   Sponsor:  [facility] Screening Interest in Study Call    Attempt #: 1, 2, 3+: 2      1. Contact Made: [x]Yes []No   1A. If yes, contact date: 22JAN2024  1B. If no, date of final contact attempt:   1C. If no, reason(s) contact not made:  []Wrong number []No response []Other   1D. If other, please specify:     2. Verbal commitment: [x]Yes  []No  2A. If yes, verbal commitment date: 22JAN2024  2B. If no, reason: []Exclusion Criteria Met  []Desire not to participate []No reason provided []Other  2B1. If Type of Exclusion Criteria Met or other reason, specify:     If patient expressed interest, patient's information will be forwarded to Mirtha Pfeiffer, Jamey Villa, or Nirmala Cantor.

## 2024-01-22 NOTE — TELEPHONE ENCOUNTER
Study Title: Transcending COVID-19 barriers to pain care in [place]: Pragmatic comparative effectiveness trial of evidence-based, on-demand, digital behavioral treatments for chronic pain.  Sponsor:  [facility]   Study: [facility] Digital Pain Treatment Study - Transcending COVID-19 barriers to pain care in [place]: Pragmatic comparative effectiveness trial of evidence-based, on-demand, digital behavioral treatments for chronic pain.   IRB/Protocol #: 2021.177 - Phase 2?  Study#([facility]):  [id number]  Principle Investigator - Bebeto Barger   Sub-Investigator - Benji Quiroz   Sponsor:  [facility] Screening Interest in Study Call    Attempt #: 1, 2, 3+: 2      1. Contact Made: []Yes [x]No   1A. If yes, contact date:   1B. If no, date of final contact attempt:   1C. If no, reason(s) contact not made:  []Wrong number []No response []Other   1D. If other, please specify: left voicemail    2. Verbal commitment: []Yes  [x]No  2A. If yes, verbal commitment date:   2B. If no, reason: []Exclusion Criteria Met  []Desire not to participate []No reason provided [x]Other  2B1. If Type of Exclusion Criteria Met or other reason, specify: left voicemail    I left a detailed message for the subject to call the office back at [phone]. This is my second attempt to reach the subject.

## 2024-01-25 ENCOUNTER — RESEARCH ENCOUNTER (OUTPATIENT)
Dept: RESEARCH | Facility: OTHER | Age: 64
End: 2024-01-25
Payer: MEDICARE

## 2024-01-25 NOTE — PROGRESS NOTES
Study Title: Transcending COVID-19 barriers to pain care in [place]: Pragmatic comparative effectiveness trial of evidence-based, on-demand, digital behavioral treatments for chronic pain.  Sponsor:  [facility]   Study: [facility] Digital Pain Treatment Study - Transcending COVID-19 barriers to pain care in [place]: Pragmatic comparative effectiveness trial of evidence-based, on-demand, digital behavioral treatments for chronic pain.   IRB/Protocol #: 2021.177 - Phase 2?  Study#([facility]):  [id number]  Principle Investigator - Bebeto Barger   Sub-Investigator - Benji Quiroz   Sponsor:  [facility] Screening Interest in Study Call    Attempt #: 1, 2, 3+: 1 patient request a call back after 3 pm today      1. Contact Made: [x]Yes []No   1A. If yes, contact date: 24/Jan/2024  1B. If no, date of final contact attempt:   1C. If no, reason(s) contact not made:  []Wrong number []No response []Other   1D. If other, please specify:     2. Verbal commitment: []Yes  []No  2A. If yes, verbal commitment date:  2B. If no, reason: []Exclusion Criteria Met  []Desire not to participate []No reason provided []Other  2B1. If Type of Exclusion Criteria Met or other reason, specify:     If patient expressed interest, patient's information will be forwarded to Mirtha Pfeiffer, Jamey Villa, or Nirmala Cantor.

## 2024-01-25 NOTE — PROGRESS NOTES
Study Title: Transcending COVID-19 barriers to pain care in [place]: Pragmatic comparative effectiveness trial of evidence-based, on-demand, digital behavioral treatments for chronic pain.  Sponsor:  [facility]   Study: [facility] Digital Pain Treatment Study - Transcending COVID-19 barriers to pain care in [place]: Pragmatic comparative effectiveness trial of evidence-based, on-demand, digital behavioral treatments for chronic pain.   IRB/Protocol #: 2021.177 - Phase 2?  Study#([facility]):  [id number]  Principle Investigator - Bebeto Barger   Sub-Investigator - Benji Quiroz   Subject Huseyin's Study Number: [id number]  Sponsor:  [facility]      Date: 25/Jan/2024       Informed Consent    Subject was consented via phone to be evaluated for the [employer] Digital Pain Treatment Research Study eligibility. Consent was completed using the most current IRB approved version of the ICF dated 11-AUG-2022 by myself and patient was given ample time to review consent prior to execution of the informed consent. Informed Consent sent vis e-mail for subject to sign.         Prior to the Informed Consent (IC) being signed, or any study protocol required data collection, testing, procedure, or intervention being performed, the following was done and/or discussed:    Purpose of the study and qualifications to participate;   Study design, follow up schedule;  Risk, Benefits, Alternative Treatments, Compensation and Costs;  Participation in the research trial is voluntary and patient may withdraw at anytime;  Contact information for study related questions.    Patient verbalized understanding of the above: yes  Contact information for CRC and PI given to patient: yes    Pt verbally agreed to being a participant in the [employer] Digital Pain Treatment research study with an IRB approved consent being read to the participant.  Patient consent was reviewed with patient and all questions answered satisfactorily.      Patient answered questions related to  their pain and lifestyle questions.Subject meets all inclusion criteria and no exclusion criteria.    Subject was provide with staff contact information should they have any questions and concerns.

## 2024-03-04 ENCOUNTER — PATIENT MESSAGE (OUTPATIENT)
Dept: ADMINISTRATIVE | Facility: OTHER | Age: 64
End: 2024-03-04
Payer: MEDICARE

## 2024-03-11 ENCOUNTER — OFFICE VISIT (OUTPATIENT)
Dept: FAMILY MEDICINE | Facility: CLINIC | Age: 64
End: 2024-03-11
Payer: MEDICARE

## 2024-03-11 ENCOUNTER — CLINICAL SUPPORT (OUTPATIENT)
Dept: FAMILY MEDICINE | Facility: CLINIC | Age: 64
End: 2024-03-11
Payer: MEDICARE

## 2024-03-11 VITALS
OXYGEN SATURATION: 96 % | BODY MASS INDEX: 44.92 KG/M2 | SYSTOLIC BLOOD PRESSURE: 116 MMHG | HEART RATE: 99 BPM | HEIGHT: 65 IN | DIASTOLIC BLOOD PRESSURE: 88 MMHG | RESPIRATION RATE: 17 BRPM | WEIGHT: 269.63 LBS

## 2024-03-11 DIAGNOSIS — R73.02 IMPAIRED GLUCOSE TOLERANCE: ICD-10-CM

## 2024-03-11 DIAGNOSIS — E66.01 MORBID OBESITY DUE TO EXCESS CALORIES: ICD-10-CM

## 2024-03-11 DIAGNOSIS — F31.76 BIPOLAR DISORDER, IN FULL REMISSION, MOST RECENT EPISODE DEPRESSED: ICD-10-CM

## 2024-03-11 DIAGNOSIS — E03.4 HYPOTHYROIDISM DUE TO ACQUIRED ATROPHY OF THYROID: ICD-10-CM

## 2024-03-11 DIAGNOSIS — F41.9 ANXIETY: ICD-10-CM

## 2024-03-11 DIAGNOSIS — G47.00 INSOMNIA, UNSPECIFIED TYPE: ICD-10-CM

## 2024-03-11 DIAGNOSIS — F41.1 GAD (GENERALIZED ANXIETY DISORDER): ICD-10-CM

## 2024-03-11 DIAGNOSIS — F13.20 SEDATIVE, HYPNOTIC OR ANXIOLYTIC DEPENDENCE, UNCOMPLICATED: Primary | ICD-10-CM

## 2024-03-11 LAB
ALBUMIN SERPL BCP-MCNC: 3.5 G/DL (ref 3.5–5.2)
ALP SERPL-CCNC: 145 U/L (ref 55–135)
ALT SERPL W/O P-5'-P-CCNC: 14 U/L (ref 10–44)
ANION GAP SERPL CALC-SCNC: 10 MMOL/L (ref 8–16)
AST SERPL-CCNC: 15 U/L (ref 10–40)
BASOPHILS # BLD AUTO: 0.14 K/UL (ref 0–0.2)
BASOPHILS NFR BLD: 1.4 % (ref 0–1.9)
BILIRUB SERPL-MCNC: 0.9 MG/DL (ref 0.1–1)
BUN SERPL-MCNC: 8 MG/DL (ref 8–23)
CALCIUM SERPL-MCNC: 9.4 MG/DL (ref 8.7–10.5)
CHLORIDE SERPL-SCNC: 105 MMOL/L (ref 95–110)
CHOLEST SERPL-MCNC: 154 MG/DL (ref 120–199)
CHOLEST/HDLC SERPL: 5.1 {RATIO} (ref 2–5)
CO2 SERPL-SCNC: 25 MMOL/L (ref 23–29)
CREAT SERPL-MCNC: 0.9 MG/DL (ref 0.5–1.4)
DIFFERENTIAL METHOD BLD: ABNORMAL
EOSINOPHIL # BLD AUTO: 0.3 K/UL (ref 0–0.5)
EOSINOPHIL NFR BLD: 2.7 % (ref 0–8)
ERYTHROCYTE [DISTWIDTH] IN BLOOD BY AUTOMATED COUNT: 14.1 % (ref 11.5–14.5)
EST. GFR  (NO RACE VARIABLE): >60 ML/MIN/1.73 M^2
ESTIMATED AVG GLUCOSE: 120 MG/DL (ref 68–131)
GLUCOSE SERPL-MCNC: 121 MG/DL (ref 70–110)
HBA1C MFR BLD: 5.8 % (ref 4–5.6)
HCT VFR BLD AUTO: 43.4 % (ref 37–48.5)
HDLC SERPL-MCNC: 30 MG/DL (ref 40–75)
HDLC SERPL: 19.5 % (ref 20–50)
HGB BLD-MCNC: 13.9 G/DL (ref 12–16)
IMM GRANULOCYTES # BLD AUTO: 0.04 K/UL (ref 0–0.04)
IMM GRANULOCYTES NFR BLD AUTO: 0.4 % (ref 0–0.5)
LDLC SERPL CALC-MCNC: 92 MG/DL (ref 63–159)
LYMPHOCYTES # BLD AUTO: 2.5 K/UL (ref 1–4.8)
LYMPHOCYTES NFR BLD: 25.3 % (ref 18–48)
MCH RBC QN AUTO: 27.7 PG (ref 27–31)
MCHC RBC AUTO-ENTMCNC: 32 G/DL (ref 32–36)
MCV RBC AUTO: 87 FL (ref 82–98)
MONOCYTES # BLD AUTO: 1.2 K/UL (ref 0.3–1)
MONOCYTES NFR BLD: 12.3 % (ref 4–15)
NEUTROPHILS # BLD AUTO: 5.6 K/UL (ref 1.8–7.7)
NEUTROPHILS NFR BLD: 57.9 % (ref 38–73)
NONHDLC SERPL-MCNC: 124 MG/DL
NRBC BLD-RTO: 0 /100 WBC
PLATELET # BLD AUTO: 366 K/UL (ref 150–450)
PMV BLD AUTO: 10.2 FL (ref 9.2–12.9)
POTASSIUM SERPL-SCNC: 4.1 MMOL/L (ref 3.5–5.1)
PROT SERPL-MCNC: 7.3 G/DL (ref 6–8.4)
RBC # BLD AUTO: 5.02 M/UL (ref 4–5.4)
SODIUM SERPL-SCNC: 140 MMOL/L (ref 136–145)
T4 FREE SERPL-MCNC: 1.09 NG/DL (ref 0.71–1.51)
TRIGL SERPL-MCNC: 160 MG/DL (ref 30–150)
TSH SERPL DL<=0.005 MIU/L-ACNC: 0.17 UIU/ML (ref 0.4–4)
WBC # BLD AUTO: 9.71 K/UL (ref 3.9–12.7)

## 2024-03-11 PROCEDURE — 83036 HEMOGLOBIN GLYCOSYLATED A1C: CPT | Performed by: FAMILY MEDICINE

## 2024-03-11 PROCEDURE — 99214 OFFICE O/P EST MOD 30 MIN: CPT | Mod: S$GLB,,, | Performed by: FAMILY MEDICINE

## 2024-03-11 PROCEDURE — 36415 COLL VENOUS BLD VENIPUNCTURE: CPT | Mod: S$GLB,,, | Performed by: FAMILY MEDICINE

## 2024-03-11 PROCEDURE — 84443 ASSAY THYROID STIM HORMONE: CPT | Performed by: FAMILY MEDICINE

## 2024-03-11 PROCEDURE — 80053 COMPREHEN METABOLIC PANEL: CPT | Performed by: FAMILY MEDICINE

## 2024-03-11 PROCEDURE — 85025 COMPLETE CBC W/AUTO DIFF WBC: CPT | Performed by: FAMILY MEDICINE

## 2024-03-11 PROCEDURE — 80061 LIPID PANEL: CPT | Performed by: FAMILY MEDICINE

## 2024-03-11 PROCEDURE — 84439 ASSAY OF FREE THYROXINE: CPT | Performed by: FAMILY MEDICINE

## 2024-03-11 PROCEDURE — 99999 PR PBB SHADOW E&M-EST. PATIENT-LVL III: CPT | Mod: PBBFAC,,, | Performed by: FAMILY MEDICINE

## 2024-03-11 RX ORDER — BUSPIRONE HYDROCHLORIDE 7.5 MG/1
7.5 TABLET ORAL 3 TIMES DAILY
Qty: 90 TABLET | Refills: 11 | Status: SHIPPED | OUTPATIENT
Start: 2024-03-11 | End: 2025-03-11

## 2024-03-11 RX ORDER — QUETIAPINE FUMARATE 200 MG/1
TABLET, FILM COATED ORAL
Qty: 90 TABLET | Refills: 1 | Status: SHIPPED | OUTPATIENT
Start: 2024-03-11 | End: 2024-04-05 | Stop reason: SDUPTHER

## 2024-03-11 RX ORDER — LEVOTHYROXINE SODIUM 150 UG/1
150 TABLET ORAL
Qty: 90 TABLET | Refills: 3 | Status: SHIPPED | OUTPATIENT
Start: 2024-03-11

## 2024-03-11 RX ORDER — CLONAZEPAM 0.5 MG/1
0.5 TABLET ORAL 2 TIMES DAILY
Qty: 60 TABLET | Refills: 0 | Status: SHIPPED | OUTPATIENT
Start: 2024-03-11 | End: 2024-04-16

## 2024-03-11 NOTE — PROGRESS NOTES
Subjective:       Patient ID: Gricelda Gil is a 64 y.o. female.    Chief Complaint: Follow-up (Pt here for 6 mth f/u. )    Pt is a 64 y.o. female who presents for evaluation and management of   Encounter Diagnoses   Name Primary?    SHERIE (generalized anxiety disorder)     Hypothyroidism due to acquired atrophy of thyroid     Insomnia, unspecified type     Sedative, hypnotic or anxiolytic dependence, uncomplicated Yes    Bipolar disorder, in full remission, most recent episode depressed     Morbid obesity due to excess calories     Bipolar disorder, in full remission, most recent episode depressed     Anxiety     Impaired glucose tolerance    .  Doing well on current meds. Denies any side effects. Prevention is up to date.  Review of Systems   Constitutional:  Negative for chills and fever.   Respiratory:  Negative for shortness of breath.    Cardiovascular:  Negative for chest pain and palpitations.   Gastrointestinal:  Negative for abdominal pain, blood in stool, constipation and nausea.   Genitourinary:  Negative for difficulty urinating.   Psychiatric/Behavioral:  Positive for sleep disturbance. Negative for dysphoric mood and suicidal ideas. The patient is nervous/anxious.        Objective:      Physical Exam  Constitutional:       Appearance: She is well-developed. She is obese.   HENT:      Head: Normocephalic and atraumatic.      Right Ear: External ear normal.      Left Ear: External ear normal.      Nose: Nose normal.   Eyes:      Pupils: Pupils are equal, round, and reactive to light.   Neck:      Thyroid: No thyromegaly.      Vascular: No JVD.      Trachea: No tracheal deviation.   Cardiovascular:      Rate and Rhythm: Normal rate.      Heart sounds: Normal heart sounds. No murmur heard.  Pulmonary:      Effort: Pulmonary effort is normal. No respiratory distress.      Breath sounds: Normal breath sounds. No wheezing or rales.   Chest:      Chest wall: No tenderness.   Abdominal:      General:  Bowel sounds are normal. There is no distension.      Palpations: Abdomen is soft. There is no mass.      Tenderness: There is no abdominal tenderness. There is no guarding or rebound.   Musculoskeletal:         General: No tenderness. Normal range of motion.      Cervical back: Normal range of motion and neck supple.   Lymphadenopathy:      Cervical: No cervical adenopathy.   Skin:     General: Skin is warm and dry.      Coloration: Skin is not pale.      Findings: No erythema or rash.   Neurological:      Mental Status: She is alert and oriented to person, place, and time.      Cranial Nerves: No cranial nerve deficit.      Motor: No abnormal muscle tone.      Coordination: Coordination normal.      Deep Tendon Reflexes: Reflexes are normal and symmetric. Reflexes normal.   Psychiatric:         Behavior: Behavior normal.         Thought Content: Thought content normal.         Judgment: Judgment normal.         Assessment:       1. Sedative, hypnotic or anxiolytic dependence, uncomplicated    2. SHERIE (generalized anxiety disorder)    3. Hypothyroidism due to acquired atrophy of thyroid    4. Insomnia, unspecified type    5. Bipolar disorder, in full remission, most recent episode depressed    6. Morbid obesity due to excess calories    7. Bipolar disorder, in full remission, most recent episode depressed    8. Anxiety    9. Impaired glucose tolerance        Plan:   1. Sedative, hypnotic or anxiolytic dependence, uncomplicated  Overview:  Has been on klonopin 0.5mg BID for years.   Out of them for a week 3/11/24 so we will start some buspar from there and see how she does. Consider low #/month BZD prn  only       Orders:  -     busPIRone (BUSPAR) 7.5 MG tablet; Take 1 tablet (7.5 mg total) by mouth 3 (three) times daily.  Dispense: 90 tablet; Refill: 11    2. SHERIE (generalized anxiety disorder)  -     clonazePAM (KLONOPIN) 0.5 MG tablet; Take 1 tablet (0.5 mg total) by mouth 2 (two) times daily.  Dispense: 60 tablet;  Refill: 0    3. Hypothyroidism due to acquired atrophy of thyroid  Overview:  Lab Results   Component Value Date    TSH 1.990 01/23/2023     Synthroid 150         Orders:  -     levothyroxine (SYNTHROID) 150 MCG tablet; Take 1 tablet (150 mcg total) by mouth before breakfast.  Dispense: 90 tablet; Refill: 3  -     CBC Auto Differential; Future; Expected date: 03/11/2024  -     Comprehensive Metabolic Panel; Future; Expected date: 03/11/2024  -     Lipid Panel; Future; Expected date: 03/11/2024  -     TSH; Future; Expected date: 03/11/2024    4. Insomnia, unspecified type  Overview:  seroquel 200mg works well   Continue       Orders:  -     QUEtiapine (SEROQUEL) 200 MG Tab; TAKE ONE-HALF TABLET BY MOUTH NIGHTLY.  Dispense: 90 tablet; Refill: 1    5. Bipolar disorder, in full remission, most recent episode depressed  Overview:  Continue seroquel Cymbalta         6. Morbid obesity due to excess calories  Overview:  The patient is asked to make an attempt to improve diet and exercise patterns to aid in medical management of this problem.  Cut out white carbs: bread, rice, pasta, potatoes. Exercise/walk 5x/week for at least 30 minutes  .        7. Bipolar disorder, in full remission, most recent episode depressed  Overview:  Continue seroquel Cymbalta         8. Anxiety  Overview:  Continue duloxetine   Klonopin 0.5mg daily---she will try to wean     Orders:  -     busPIRone (BUSPAR) 7.5 MG tablet; Take 1 tablet (7.5 mg total) by mouth 3 (three) times daily.  Dispense: 90 tablet; Refill: 11    9. Impaired glucose tolerance  -     Hemoglobin A1C; Future; Expected date: 03/11/2024    Klonopin d/c'd ( she hasn't taken in 1 week). Hasn't slept much.  We try buspar   RTC 1 month       No follow-ups on file.

## 2024-04-05 DIAGNOSIS — G47.00 INSOMNIA, UNSPECIFIED TYPE: ICD-10-CM

## 2024-04-06 NOTE — TELEPHONE ENCOUNTER
No care due was identified.  [facility] Embedded Care Due Messages. Reference number: [id number].   4/05/2024 10:20:16 PM CDT

## 2024-04-08 RX ORDER — QUETIAPINE FUMARATE 200 MG/1
TABLET, FILM COATED ORAL
Qty: 90 TABLET | Refills: 1 | Status: SHIPPED | OUTPATIENT
Start: 2024-04-08 | End: 2024-04-16

## 2024-04-16 ENCOUNTER — OFFICE VISIT (OUTPATIENT)
Dept: FAMILY MEDICINE | Facility: CLINIC | Age: 64
End: 2024-04-16
Payer: MEDICARE

## 2024-04-16 ENCOUNTER — APPOINTMENT (OUTPATIENT)
Dept: RADIOLOGY | Facility: CLINIC | Age: 64
End: 2024-04-16
Attending: FAMILY MEDICINE
Payer: MEDICARE

## 2024-04-16 VITALS
OXYGEN SATURATION: 96 % | SYSTOLIC BLOOD PRESSURE: 126 MMHG | WEIGHT: 269.06 LBS | BODY MASS INDEX: 44.83 KG/M2 | DIASTOLIC BLOOD PRESSURE: 80 MMHG | HEART RATE: 88 BPM | RESPIRATION RATE: 17 BRPM | HEIGHT: 65 IN

## 2024-04-16 DIAGNOSIS — M25.562 CHRONIC PAIN OF LEFT KNEE: ICD-10-CM

## 2024-04-16 DIAGNOSIS — Z96.652 S/P TOTAL KNEE ARTHROPLASTY, LEFT: ICD-10-CM

## 2024-04-16 DIAGNOSIS — F41.9 ANXIETY: ICD-10-CM

## 2024-04-16 DIAGNOSIS — G89.29 CHRONIC PAIN OF LEFT KNEE: Primary | ICD-10-CM

## 2024-04-16 DIAGNOSIS — G47.00 INSOMNIA, UNSPECIFIED TYPE: ICD-10-CM

## 2024-04-16 DIAGNOSIS — Z86.73 H/O TIA (TRANSIENT ISCHEMIC ATTACK) AND STROKE: ICD-10-CM

## 2024-04-16 DIAGNOSIS — M25.562 CHRONIC PAIN OF LEFT KNEE: Primary | ICD-10-CM

## 2024-04-16 DIAGNOSIS — M85.89 OTHER SPECIFIED DISORDERS OF BONE DENSITY AND STRUCTURE, MULTIPLE SITES: ICD-10-CM

## 2024-04-16 DIAGNOSIS — G89.29 CHRONIC PAIN OF LEFT KNEE: ICD-10-CM

## 2024-04-16 PROCEDURE — 73560 X-RAY EXAM OF KNEE 1 OR 2: CPT | Mod: TC,PO,LT

## 2024-04-16 PROCEDURE — 3079F DIAST BP 80-89 MM HG: CPT | Mod: CPTII,S$GLB,, | Performed by: FAMILY MEDICINE

## 2024-04-16 PROCEDURE — 3008F BODY MASS INDEX DOCD: CPT | Mod: CPTII,S$GLB,, | Performed by: FAMILY MEDICINE

## 2024-04-16 PROCEDURE — 4010F ACE/ARB THERAPY RXD/TAKEN: CPT | Mod: CPTII,S$GLB,, | Performed by: FAMILY MEDICINE

## 2024-04-16 PROCEDURE — 1159F MED LIST DOCD IN RCRD: CPT | Mod: CPTII,S$GLB,, | Performed by: FAMILY MEDICINE

## 2024-04-16 PROCEDURE — 99999 PR PBB SHADOW E&M-EST. PATIENT-LVL IV: CPT | Mod: PBBFAC,,, | Performed by: FAMILY MEDICINE

## 2024-04-16 PROCEDURE — 3044F HG A1C LEVEL LT 7.0%: CPT | Mod: CPTII,S$GLB,, | Performed by: FAMILY MEDICINE

## 2024-04-16 PROCEDURE — 99214 OFFICE O/P EST MOD 30 MIN: CPT | Mod: S$GLB,,, | Performed by: FAMILY MEDICINE

## 2024-04-16 PROCEDURE — G2211 COMPLEX E/M VISIT ADD ON: HCPCS | Mod: S$GLB,,, | Performed by: FAMILY MEDICINE

## 2024-04-16 PROCEDURE — 3074F SYST BP LT 130 MM HG: CPT | Mod: CPTII,S$GLB,, | Performed by: FAMILY MEDICINE

## 2024-04-16 PROCEDURE — 73560 X-RAY EXAM OF KNEE 1 OR 2: CPT | Mod: 26,LT,, | Performed by: RADIOLOGY

## 2024-04-16 RX ORDER — DICLOFENAC SODIUM 50 MG/1
50 TABLET, DELAYED RELEASE ORAL 2 TIMES DAILY PRN
Qty: 60 TABLET | Refills: 1 | Status: SHIPPED | OUTPATIENT
Start: 2024-04-16

## 2024-04-16 RX ORDER — QUETIAPINE FUMARATE 50 MG/1
TABLET, FILM COATED ORAL
Qty: 90 TABLET | Refills: 1 | Status: SHIPPED | OUTPATIENT
Start: 2024-04-16

## 2024-04-16 NOTE — PROGRESS NOTES
Subjective:       Patient ID: Gricelda Gil is a 64 y.o. female.    Chief Complaint: Follow-up (Pt here for 1 mth f/u. )    Pt is a 64 y.o. female who presents for evaluation and management of   Encounter Diagnoses   Name Primary?    Insomnia, unspecified type     Chronic pain of left knee Yes    H/O TIA (transient ischemic attack) and stroke     Anxiety     S/P total knee arthroplasty, left     Other specified disorders of bone density and structure, multiple sites    .could not walk on her left knee for one day about 2 weeks ago. Was unable to move it   Resolved the next day...   H/o knee arthroplasty in '21     Doing well on current meds. Denies any side effects. Prevention is up to date.    Review of Systems   Constitutional:  Negative for chills and fever.   Respiratory:  Negative for shortness of breath.    Cardiovascular:  Negative for chest pain and palpitations.   Gastrointestinal:  Negative for abdominal pain, blood in stool, constipation and nausea.   Genitourinary:  Negative for difficulty urinating.   Musculoskeletal:  Positive for arthralgias.   Psychiatric/Behavioral:  Negative for dysphoric mood, sleep disturbance and suicidal ideas. The patient is not nervous/anxious.        Objective:      Physical Exam  Constitutional:       Appearance: She is well-developed. She is obese.   HENT:      Head: Normocephalic and atraumatic.      Right Ear: External ear normal.      Left Ear: External ear normal.      Nose: Nose normal.   Eyes:      Pupils: Pupils are equal, round, and reactive to light.   Neck:      Thyroid: No thyromegaly.      Vascular: No JVD.      Trachea: No tracheal deviation.   Cardiovascular:      Rate and Rhythm: Normal rate.      Heart sounds: Normal heart sounds. No murmur heard.  Pulmonary:      Effort: Pulmonary effort is normal. No respiratory distress.      Breath sounds: Normal breath sounds. No wheezing or rales.   Chest:      Chest wall: No tenderness.   Abdominal:       General: Bowel sounds are normal. There is no distension.      Palpations: Abdomen is soft. There is no mass.      Tenderness: There is no abdominal tenderness. There is no guarding or rebound.   Musculoskeletal:         General: Tenderness present. Normal range of motion.      Cervical back: Normal range of motion and neck supple.   Lymphadenopathy:      Cervical: No cervical adenopathy.   Skin:     General: Skin is warm and dry.      Coloration: Skin is not pale.      Findings: No erythema or rash.   Neurological:      Mental Status: She is alert and oriented to person, place, and time.      Cranial Nerves: No cranial nerve deficit.      Motor: No abnormal muscle tone.      Coordination: Coordination normal.      Deep Tendon Reflexes: Reflexes are normal and symmetric. Reflexes normal.   Psychiatric:         Behavior: Behavior normal.         Thought Content: Thought content normal.         Judgment: Judgment normal.         Assessment:       1. Chronic pain of left knee    2. Insomnia, unspecified type    3. H/O TIA (transient ischemic attack) and stroke    4. Anxiety    5. S/P total knee arthroplasty, left    6. Other specified disorders of bone density and structure, multiple sites        Plan:   1. Chronic pain of left knee  -     X-Ray Knee 1 or 2 View Left; Future; Expected date: 04/16/2024  -     diclofenac (VOLTAREN) 50 MG EC tablet; Take 1 tablet (50 mg total) by mouth 2 (two) times daily as needed.  Dispense: 60 tablet; Refill: 1    2. Insomnia, unspecified type  Overview:  seroquel 200mg works well   Continue       Orders:  -     QUEtiapine (SEROQUEL) 50 MG tablet; TAKE ONE-HALF TABLET BY MOUTH NIGHTLY.  Dispense: 90 tablet; Refill: 1    3. H/O TIA (transient ischemic attack) and stroke  Overview:  Was dx with TIA in [place] 2010 per pt   Continue statin, ASA 81         4. Anxiety  Overview:  Continue duloxetine   Klonopin 0.5mg daily---she will try to wean . D/C 4/16/24   Continue buspar 7.5 mg TID          5. S/P total knee arthroplasty, left  -     diclofenac (VOLTAREN) 50 MG EC tablet; Take 1 tablet (50 mg total) by mouth 2 (two) times daily as needed.  Dispense: 60 tablet; Refill: 1  -     Ambulatory referral/consult to Orthopedics; Future; Expected date: 04/23/2024    6. Other specified disorders of bone density and structure, multiple sites  -     DXA Bone Density Axial Skeleton 1 or more sites; Future; Expected date: 04/16/2024    She looks like she may be osteopenic on her xray. Getting a DXA scan. Also looks like her prosthesis may by moving some based on the xray... ? (Radiology read not available at time of note)   Referring her to her knee surgeon for an opinion...     No follow-ups on file.

## 2024-04-23 ENCOUNTER — HOSPITAL ENCOUNTER (OUTPATIENT)
Dept: RADIOLOGY | Facility: HOSPITAL | Age: 64
Discharge: HOME | End: 2024-04-23
Attending: FAMILY MEDICINE
Payer: MEDICARE

## 2024-04-23 DIAGNOSIS — M85.89 OTHER SPECIFIED DISORDERS OF BONE DENSITY AND STRUCTURE, MULTIPLE SITES: ICD-10-CM

## 2024-04-23 PROCEDURE — 77080 DXA BONE DENSITY AXIAL: CPT | Mod: TC

## 2024-04-23 PROCEDURE — 77080 DXA BONE DENSITY AXIAL: CPT | Mod: 26,,, | Performed by: RADIOLOGY

## 2024-04-25 NOTE — PROGRESS NOTES
No osteoporosis on DXA but she does have some osteopenia   She needs to take Vit D 1000IU daily to prevent osteoporosis   Thanks

## 2024-05-23 ENCOUNTER — OFFICE VISIT (OUTPATIENT)
Dept: FAMILY MEDICINE | Facility: CLINIC | Age: 64
End: 2024-05-23
Payer: MEDICARE

## 2024-05-23 VITALS
BODY MASS INDEX: 44.65 KG/M2 | RESPIRATION RATE: 20 BRPM | HEIGHT: 65 IN | SYSTOLIC BLOOD PRESSURE: 136 MMHG | HEART RATE: 92 BPM | WEIGHT: 268 LBS | DIASTOLIC BLOOD PRESSURE: 108 MMHG

## 2024-05-23 DIAGNOSIS — F51.04 INSOMNIA, PSYCHOPHYSIOLOGICAL: ICD-10-CM

## 2024-05-23 DIAGNOSIS — E66.01 MORBID OBESITY DUE TO EXCESS CALORIES: Primary | ICD-10-CM

## 2024-05-23 DIAGNOSIS — I10 PRIMARY HYPERTENSION: ICD-10-CM

## 2024-05-23 PROCEDURE — 4010F ACE/ARB THERAPY RXD/TAKEN: CPT | Mod: CPTII,S$GLB,, | Performed by: FAMILY MEDICINE

## 2024-05-23 PROCEDURE — 3080F DIAST BP >= 90 MM HG: CPT | Mod: CPTII,S$GLB,, | Performed by: FAMILY MEDICINE

## 2024-05-23 PROCEDURE — 3044F HG A1C LEVEL LT 7.0%: CPT | Mod: CPTII,S$GLB,, | Performed by: FAMILY MEDICINE

## 2024-05-23 PROCEDURE — 99214 OFFICE O/P EST MOD 30 MIN: CPT | Mod: S$GLB,,, | Performed by: FAMILY MEDICINE

## 2024-05-23 PROCEDURE — 1159F MED LIST DOCD IN RCRD: CPT | Mod: CPTII,S$GLB,, | Performed by: FAMILY MEDICINE

## 2024-05-23 PROCEDURE — 99999 PR PBB SHADOW E&M-EST. PATIENT-LVL III: CPT | Mod: PBBFAC,,, | Performed by: FAMILY MEDICINE

## 2024-05-23 PROCEDURE — 3075F SYST BP GE 130 - 139MM HG: CPT | Mod: CPTII,S$GLB,, | Performed by: FAMILY MEDICINE

## 2024-05-23 PROCEDURE — 3008F BODY MASS INDEX DOCD: CPT | Mod: CPTII,S$GLB,, | Performed by: FAMILY MEDICINE

## 2024-05-23 RX ORDER — AMLODIPINE AND OLMESARTAN MEDOXOMIL 10; 40 MG/1; MG/1
1 TABLET ORAL DAILY
Qty: 30 TABLET | Refills: 5 | Status: SHIPPED | OUTPATIENT
Start: 2024-05-23 | End: 2025-05-23

## 2024-05-23 RX ORDER — NORTRIPTYLINE HYDROCHLORIDE 25 MG/1
25 CAPSULE ORAL NIGHTLY
Qty: 30 CAPSULE | Refills: 5 | Status: SHIPPED | OUTPATIENT
Start: 2024-05-23 | End: 2025-05-23

## 2024-05-23 NOTE — PROGRESS NOTES
Subjective:       Patient ID: Gricelda Gil is a 64 y.o. female.    Chief Complaint: Obesity (Patient would like to discuss weight loss options)    Pt is a 64 y.o. female who presents for evaluation and management of   Encounter Diagnoses   Name Primary?    Morbid obesity due to excess calories Yes    Insomnia, psychophysiological     Primary hypertension    .  Doing well on current meds. Denies any side effects. Prevention is up to date.  Review of Systems   Constitutional:  Negative for fatigue.   Psychiatric/Behavioral:  Positive for dysphoric mood and sleep disturbance. The patient is not nervous/anxious.         Bummed out about her 15 y/o dog that is dying   Sleep is poor          Objective:      Physical Exam  Constitutional:       Appearance: She is well-developed. She is obese.   HENT:      Head: Normocephalic and atraumatic.      Right Ear: External ear normal.      Left Ear: External ear normal.      Nose: Nose normal.   Eyes:      Pupils: Pupils are equal, round, and reactive to light.   Neck:      Thyroid: No thyromegaly.      Vascular: No JVD.      Trachea: No tracheal deviation.   Cardiovascular:      Rate and Rhythm: Normal rate.      Heart sounds: Normal heart sounds. No murmur heard.  Pulmonary:      Effort: Pulmonary effort is normal. No respiratory distress.      Breath sounds: Normal breath sounds. No wheezing or rales.   Chest:      Chest wall: No tenderness.   Abdominal:      General: Bowel sounds are normal. There is no distension.      Palpations: Abdomen is soft. There is no mass.      Tenderness: There is no abdominal tenderness. There is no guarding or rebound.   Musculoskeletal:         General: No tenderness. Normal range of motion.      Cervical back: Normal range of motion and neck supple.   Lymphadenopathy:      Cervical: No cervical adenopathy.   Skin:     General: Skin is warm and dry.      Coloration: Skin is not pale.      Findings: No erythema or rash.   Neurological:       Mental Status: She is alert and oriented to person, place, and time.      Cranial Nerves: No cranial nerve deficit.      Motor: No abnormal muscle tone.      Coordination: Coordination normal.      Deep Tendon Reflexes: Reflexes are normal and symmetric. Reflexes normal.   Psychiatric:         Behavior: Behavior normal.         Thought Content: Thought content normal.         Judgment: Judgment normal.         Assessment:       1. Morbid obesity due to excess calories    2. Insomnia, psychophysiological    3. Primary hypertension        Plan:   1. Morbid obesity due to excess calories  Overview:  Glp-1 not an option     The patient is asked to make an attempt to improve diet and exercise patterns to aid in medical management of this problem.  Cut out white carbs: bread, rice, pasta, potatoes. Exercise/walk 5x/week for at least 30 minutes  .      Orders:  -     nortriptyline (PAMELOR) 25 MG capsule; Take 1 capsule (25 mg total) by mouth every evening.  Dispense: 30 capsule; Refill: 5    2. Insomnia, psychophysiological  -     nortriptyline (PAMELOR) 25 MG capsule; Take 1 capsule (25 mg total) by mouth every evening.  Dispense: 30 capsule; Refill: 5    3. Primary hypertension  Overview:  lotrel 40/5       Orders:  -     amlodipine-olmesartan (SURESH) 10-40 mg per tablet; Take 1 tablet by mouth once daily.  Dispense: 30 tablet; Refill: 5    Adding TCa to help her sleep   Stop lotrel and start Suresh for uncontrolled BP   RTC 3 weeks       No follow-ups on file.

## 2024-06-13 ENCOUNTER — OFFICE VISIT (OUTPATIENT)
Dept: FAMILY MEDICINE | Facility: CLINIC | Age: 64
End: 2024-06-13
Payer: MEDICARE

## 2024-06-13 VITALS
SYSTOLIC BLOOD PRESSURE: 116 MMHG | RESPIRATION RATE: 17 BRPM | HEART RATE: 86 BPM | WEIGHT: 273.94 LBS | OXYGEN SATURATION: 95 % | DIASTOLIC BLOOD PRESSURE: 72 MMHG | BODY MASS INDEX: 45.64 KG/M2 | HEIGHT: 65 IN

## 2024-06-13 DIAGNOSIS — G47.00 INSOMNIA, UNSPECIFIED TYPE: Primary | ICD-10-CM

## 2024-06-13 DIAGNOSIS — Z12.31 ENCOUNTER FOR SCREENING MAMMOGRAM FOR MALIGNANT NEOPLASM OF BREAST: ICD-10-CM

## 2024-06-13 DIAGNOSIS — I10 PRIMARY HYPERTENSION: ICD-10-CM

## 2024-06-13 PROCEDURE — 99999 PR PBB SHADOW E&M-EST. PATIENT-LVL III: CPT | Mod: PBBFAC,,, | Performed by: FAMILY MEDICINE

## 2024-06-13 PROCEDURE — 4010F ACE/ARB THERAPY RXD/TAKEN: CPT | Mod: CPTII,S$GLB,, | Performed by: FAMILY MEDICINE

## 2024-06-13 PROCEDURE — 1159F MED LIST DOCD IN RCRD: CPT | Mod: CPTII,S$GLB,, | Performed by: FAMILY MEDICINE

## 2024-06-13 PROCEDURE — 1160F RVW MEDS BY RX/DR IN RCRD: CPT | Mod: CPTII,S$GLB,, | Performed by: FAMILY MEDICINE

## 2024-06-13 PROCEDURE — 3044F HG A1C LEVEL LT 7.0%: CPT | Mod: CPTII,S$GLB,, | Performed by: FAMILY MEDICINE

## 2024-06-13 PROCEDURE — G2211 COMPLEX E/M VISIT ADD ON: HCPCS | Mod: S$GLB,,, | Performed by: FAMILY MEDICINE

## 2024-06-13 PROCEDURE — 3008F BODY MASS INDEX DOCD: CPT | Mod: CPTII,S$GLB,, | Performed by: FAMILY MEDICINE

## 2024-06-13 PROCEDURE — 3078F DIAST BP <80 MM HG: CPT | Mod: CPTII,S$GLB,, | Performed by: FAMILY MEDICINE

## 2024-06-13 PROCEDURE — 3074F SYST BP LT 130 MM HG: CPT | Mod: CPTII,S$GLB,, | Performed by: FAMILY MEDICINE

## 2024-06-13 PROCEDURE — 99213 OFFICE O/P EST LOW 20 MIN: CPT | Mod: S$GLB,,, | Performed by: FAMILY MEDICINE

## 2024-06-13 NOTE — PROGRESS NOTES
Subjective:       Patient ID: Gricelda Gil is a 64 y.o. female.    Chief Complaint: Follow-up (Pt here for 3 wk f/u.)    Pt is a 64 y.o. female who presents for evaluation and management of   Encounter Diagnoses   Name Primary?    Insomnia, unspecified type Yes    Encounter for screening mammogram for malignant neoplasm of breast     Primary hypertension    .  Doing well on current meds. Denies any side effects. Prevention is up to date.  Review of Systems   Constitutional:  Negative for chills and fever.   Respiratory:  Negative for shortness of breath.    Cardiovascular:  Negative for chest pain and palpitations.   Gastrointestinal:  Negative for abdominal pain, blood in stool, constipation and nausea.   Genitourinary:  Negative for difficulty urinating.   Psychiatric/Behavioral:  Negative for dysphoric mood, sleep disturbance and suicidal ideas. The patient is not nervous/anxious.        Objective:      Physical Exam  Constitutional:       Appearance: She is well-developed.   HENT:      Head: Normocephalic and atraumatic.      Right Ear: External ear normal.      Left Ear: External ear normal.      Nose: Nose normal.   Eyes:      Pupils: Pupils are equal, round, and reactive to light.   Neck:      Thyroid: No thyromegaly.      Vascular: No JVD.      Trachea: No tracheal deviation.   Cardiovascular:      Rate and Rhythm: Normal rate.      Heart sounds: Normal heart sounds. No murmur heard.  Pulmonary:      Effort: Pulmonary effort is normal. No respiratory distress.      Breath sounds: Normal breath sounds. No wheezing or rales.   Chest:      Chest wall: No tenderness.   Abdominal:      General: Bowel sounds are normal. There is no distension.      Palpations: Abdomen is soft. There is no mass.      Tenderness: There is no abdominal tenderness. There is no guarding or rebound.   Musculoskeletal:         General: No tenderness. Normal range of motion.      Cervical back: Normal range of motion and neck  supple.   Lymphadenopathy:      Cervical: No cervical adenopathy.   Skin:     General: Skin is warm and dry.      Coloration: Skin is not pale.      Findings: No erythema or rash.   Neurological:      Mental Status: She is alert and oriented to person, place, and time.      Cranial Nerves: No cranial nerve deficit.      Motor: No abnormal muscle tone.      Coordination: Coordination normal.      Deep Tendon Reflexes: Reflexes are normal and symmetric. Reflexes normal.   Psychiatric:         Behavior: Behavior normal.         Thought Content: Thought content normal.         Judgment: Judgment normal.         Assessment:       1. Insomnia, unspecified type    2. Encounter for screening mammogram for malignant neoplasm of breast    3. Primary hypertension        Plan:   1. Insomnia, unspecified type  Overview:  seroquel 75mg works well   Nortriptyline 25mg qhs prn           2. Encounter for screening mammogram for malignant neoplasm of breast  -     Mammo Digital Screening Bilat; Future; Expected date: 06/13/2024    3. Primary hypertension  Overview:  Malini 40/10        RTC 6 months     No follow-ups on file.

## 2024-08-03 ENCOUNTER — PATIENT MESSAGE (OUTPATIENT)
Dept: ADMINISTRATIVE | Facility: OTHER | Age: 64
End: 2024-08-03
Payer: MEDICARE

## 2024-11-06 ENCOUNTER — OFFICE VISIT (OUTPATIENT)
Dept: FAMILY MEDICINE | Facility: CLINIC | Age: 64
End: 2024-11-06
Payer: MEDICARE

## 2024-11-06 VITALS
DIASTOLIC BLOOD PRESSURE: 88 MMHG | BODY MASS INDEX: 46.19 KG/M2 | WEIGHT: 277.25 LBS | HEART RATE: 98 BPM | RESPIRATION RATE: 20 BRPM | SYSTOLIC BLOOD PRESSURE: 128 MMHG | OXYGEN SATURATION: 96 % | HEIGHT: 65 IN

## 2024-11-06 DIAGNOSIS — G47.00 INSOMNIA, UNSPECIFIED TYPE: Primary | ICD-10-CM

## 2024-11-06 DIAGNOSIS — Z86.73 H/O TIA (TRANSIENT ISCHEMIC ATTACK) AND STROKE: ICD-10-CM

## 2024-11-06 DIAGNOSIS — F41.9 ANXIETY: ICD-10-CM

## 2024-11-06 DIAGNOSIS — E78.5 DYSLIPIDEMIA: ICD-10-CM

## 2024-11-06 DIAGNOSIS — I10 PRIMARY HYPERTENSION: ICD-10-CM

## 2024-11-06 DIAGNOSIS — E03.9 HYPOTHYROIDISM, UNSPECIFIED TYPE: ICD-10-CM

## 2024-11-06 DIAGNOSIS — R73.03 PREDIABETES: ICD-10-CM

## 2024-11-06 PROCEDURE — 4010F ACE/ARB THERAPY RXD/TAKEN: CPT | Mod: CPTII,S$GLB,, | Performed by: FAMILY MEDICINE

## 2024-11-06 PROCEDURE — 3008F BODY MASS INDEX DOCD: CPT | Mod: CPTII,S$GLB,, | Performed by: FAMILY MEDICINE

## 2024-11-06 PROCEDURE — 3079F DIAST BP 80-89 MM HG: CPT | Mod: CPTII,S$GLB,, | Performed by: FAMILY MEDICINE

## 2024-11-06 PROCEDURE — 99999 PR PBB SHADOW E&M-EST. PATIENT-LVL III: CPT | Mod: PBBFAC,,, | Performed by: FAMILY MEDICINE

## 2024-11-06 PROCEDURE — G2211 COMPLEX E/M VISIT ADD ON: HCPCS | Mod: S$GLB,,, | Performed by: FAMILY MEDICINE

## 2024-11-06 PROCEDURE — 1159F MED LIST DOCD IN RCRD: CPT | Mod: CPTII,S$GLB,, | Performed by: FAMILY MEDICINE

## 2024-11-06 PROCEDURE — 1160F RVW MEDS BY RX/DR IN RCRD: CPT | Mod: CPTII,S$GLB,, | Performed by: FAMILY MEDICINE

## 2024-11-06 PROCEDURE — 3044F HG A1C LEVEL LT 7.0%: CPT | Mod: CPTII,S$GLB,, | Performed by: FAMILY MEDICINE

## 2024-11-06 PROCEDURE — 3074F SYST BP LT 130 MM HG: CPT | Mod: CPTII,S$GLB,, | Performed by: FAMILY MEDICINE

## 2024-11-06 PROCEDURE — 99214 OFFICE O/P EST MOD 30 MIN: CPT | Mod: S$GLB,,, | Performed by: FAMILY MEDICINE

## 2024-11-06 RX ORDER — AMITRIPTYLINE HYDROCHLORIDE 25 MG/1
25 TABLET, FILM COATED ORAL NIGHTLY PRN
Qty: 30 TABLET | Refills: 5 | Status: SHIPPED | OUTPATIENT
Start: 2024-11-06 | End: 2025-11-06
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
## 2024-12-04 ENCOUNTER — LAB VISIT (OUTPATIENT)
Dept: LAB | Facility: HOSPITAL | Age: 64
End: 2024-12-04
Attending: FAMILY MEDICINE
Payer: MEDICARE

## 2024-12-04 DIAGNOSIS — E03.9 HYPOTHYROIDISM, UNSPECIFIED TYPE: ICD-10-CM

## 2024-12-04 DIAGNOSIS — I10 PRIMARY HYPERTENSION: ICD-10-CM

## 2024-12-04 DIAGNOSIS — E78.5 DYSLIPIDEMIA: ICD-10-CM

## 2024-12-04 DIAGNOSIS — R73.03 PREDIABETES: ICD-10-CM

## 2024-12-04 LAB
ALBUMIN SERPL BCP-MCNC: 3.7 G/DL (ref 3.5–5.2)
ALP SERPL-CCNC: 143 U/L (ref 40–150)
ALT SERPL W/O P-5'-P-CCNC: 24 U/L (ref 10–44)
ANION GAP SERPL CALC-SCNC: 8 MMOL/L (ref 8–16)
AST SERPL-CCNC: 19 U/L (ref 10–40)
BASOPHILS # BLD AUTO: 0.14 K/UL (ref 0–0.2)
BASOPHILS NFR BLD: 1.7 % (ref 0–1.9)
BILIRUB SERPL-MCNC: 0.7 MG/DL (ref 0.1–1)
BUN SERPL-MCNC: 11 MG/DL (ref 8–23)
CALCIUM SERPL-MCNC: 9.3 MG/DL (ref 8.7–10.5)
CHLORIDE SERPL-SCNC: 105 MMOL/L (ref 95–110)
CO2 SERPL-SCNC: 25 MMOL/L (ref 23–29)
CREAT SERPL-MCNC: 0.9 MG/DL (ref 0.5–1.4)
DIFFERENTIAL METHOD BLD: ABNORMAL
EOSINOPHIL # BLD AUTO: 0.3 K/UL (ref 0–0.5)
EOSINOPHIL NFR BLD: 4 % (ref 0–8)
ERYTHROCYTE [DISTWIDTH] IN BLOOD BY AUTOMATED COUNT: 13.7 % (ref 11.5–14.5)
EST. GFR  (NO RACE VARIABLE): >60 ML/MIN/1.73 M^2
ESTIMATED AVG GLUCOSE: 114 MG/DL (ref 68–131)
GLUCOSE SERPL-MCNC: 120 MG/DL (ref 70–110)
HBA1C MFR BLD: 5.6 % (ref 4–5.6)
HCT VFR BLD AUTO: 42.5 % (ref 37–48.5)
HGB BLD-MCNC: 14.1 G/DL (ref 12–16)
IMM GRANULOCYTES # BLD AUTO: 0.02 K/UL (ref 0–0.04)
IMM GRANULOCYTES NFR BLD AUTO: 0.2 % (ref 0–0.5)
LYMPHOCYTES # BLD AUTO: 2.4 K/UL (ref 1–4.8)
LYMPHOCYTES NFR BLD: 29.9 % (ref 18–48)
MCH RBC QN AUTO: 28.7 PG (ref 27–31)
MCHC RBC AUTO-ENTMCNC: 33.2 G/DL (ref 32–36)
MCV RBC AUTO: 87 FL (ref 82–98)
MONOCYTES # BLD AUTO: 1.1 K/UL (ref 0.3–1)
MONOCYTES NFR BLD: 13.6 % (ref 4–15)
NEUTROPHILS # BLD AUTO: 4.1 K/UL (ref 1.8–7.7)
NEUTROPHILS NFR BLD: 50.6 % (ref 38–73)
NRBC BLD-RTO: 0 /100 WBC
PLATELET # BLD AUTO: 348 K/UL (ref 150–450)
PMV BLD AUTO: 10.5 FL (ref 9.2–12.9)
POTASSIUM SERPL-SCNC: 4.1 MMOL/L (ref 3.5–5.1)
PROT SERPL-MCNC: 7.4 G/DL (ref 6–8.4)
RBC # BLD AUTO: 4.91 M/UL (ref 4–5.4)
SODIUM SERPL-SCNC: 138 MMOL/L (ref 136–145)
TSH SERPL DL<=0.005 MIU/L-ACNC: 1.51 UIU/ML (ref 0.4–4)
WBC # BLD AUTO: 8.16 K/UL (ref 3.9–12.7)

## 2024-12-04 PROCEDURE — 36415 COLL VENOUS BLD VENIPUNCTURE: CPT | Performed by: FAMILY MEDICINE

## 2024-12-04 PROCEDURE — 83036 HEMOGLOBIN GLYCOSYLATED A1C: CPT | Performed by: FAMILY MEDICINE

## 2024-12-04 PROCEDURE — 80053 COMPREHEN METABOLIC PANEL: CPT | Performed by: FAMILY MEDICINE

## 2024-12-04 PROCEDURE — 85025 COMPLETE CBC W/AUTO DIFF WBC: CPT | Performed by: FAMILY MEDICINE

## 2024-12-04 PROCEDURE — 84443 ASSAY THYROID STIM HORMONE: CPT | Performed by: FAMILY MEDICINE

## 2024-12-06 ENCOUNTER — PATIENT MESSAGE (OUTPATIENT)
Dept: ADMINISTRATIVE | Facility: HOSPITAL | Age: 64
End: 2024-12-06
Payer: MEDICARE

## 2025-01-09 DIAGNOSIS — I10 PRIMARY HYPERTENSION: ICD-10-CM

## 2025-01-09 DIAGNOSIS — Z96.652 S/P TOTAL KNEE ARTHROPLASTY, LEFT: ICD-10-CM

## 2025-01-09 DIAGNOSIS — G89.29 CHRONIC BILATERAL LOW BACK PAIN WITHOUT SCIATICA: ICD-10-CM

## 2025-01-09 DIAGNOSIS — G89.29 CHRONIC PAIN OF RIGHT KNEE: ICD-10-CM

## 2025-01-09 DIAGNOSIS — E78.5 DYSLIPIDEMIA: ICD-10-CM

## 2025-01-09 DIAGNOSIS — G89.29 CHRONIC PAIN OF LEFT KNEE: ICD-10-CM

## 2025-01-09 DIAGNOSIS — M25.561 CHRONIC PAIN OF RIGHT KNEE: ICD-10-CM

## 2025-01-09 DIAGNOSIS — M25.562 CHRONIC PAIN OF LEFT KNEE: ICD-10-CM

## 2025-01-09 DIAGNOSIS — M54.50 CHRONIC BILATERAL LOW BACK PAIN WITHOUT SCIATICA: ICD-10-CM

## 2025-01-09 DIAGNOSIS — F32.A DEPRESSION, UNSPECIFIED DEPRESSION TYPE: ICD-10-CM

## 2025-01-09 RX ORDER — DULOXETIN HYDROCHLORIDE 60 MG/1
60 CAPSULE, DELAYED RELEASE ORAL DAILY
Qty: 90 CAPSULE | Refills: 2 | Status: SHIPPED | OUTPATIENT
Start: 2025-01-09

## 2025-01-09 RX ORDER — ATORVASTATIN CALCIUM 40 MG/1
40 TABLET, FILM COATED ORAL DAILY
Qty: 90 TABLET | Refills: 1 | Status: SHIPPED | OUTPATIENT
Start: 2025-01-09

## 2025-01-09 RX ORDER — DICLOFENAC SODIUM 50 MG/1
50 TABLET, DELAYED RELEASE ORAL 2 TIMES DAILY PRN
Qty: 60 TABLET | Refills: 1 | Status: SHIPPED | OUTPATIENT
Start: 2025-01-09

## 2025-01-09 RX ORDER — AMLODIPINE AND OLMESARTAN MEDOXOMIL 10; 40 MG/1; MG/1
1 TABLET ORAL DAILY
Qty: 30 TABLET | Refills: 5 | Status: SHIPPED | OUTPATIENT
Start: 2025-01-09 | End: 2026-01-09

## 2025-01-09 NOTE — TELEPHONE ENCOUNTER
LOV 11/06/2024    patient requesting refill for   DULoxetine (CYMBALTA) 60 MG capsule   atorvastatin (LIPITOR) 40 MG tablet   diclofenac (VOLTAREN) 50 MG EC tablet   amlodipine-olmesartan (SURESH) 10-40 mg per tablet   levothyroxine (SYNTHROID) 150 MCG tablet     RX pending   pharmacy confirmed  please advise

## 2025-03-26 ENCOUNTER — PATIENT MESSAGE (OUTPATIENT)
Dept: ADMINISTRATIVE | Facility: OTHER | Age: 65
End: 2025-03-26
Payer: MEDICARE

## 2025-03-26 DIAGNOSIS — G89.29 CHRONIC PAIN OF LEFT KNEE: ICD-10-CM

## 2025-03-26 DIAGNOSIS — E03.4 HYPOTHYROIDISM DUE TO ACQUIRED ATROPHY OF THYROID: ICD-10-CM

## 2025-03-26 DIAGNOSIS — Z96.652 S/P TOTAL KNEE ARTHROPLASTY, LEFT: ICD-10-CM

## 2025-03-26 DIAGNOSIS — M25.562 CHRONIC PAIN OF LEFT KNEE: ICD-10-CM

## 2025-03-26 RX ORDER — DICLOFENAC SODIUM 50 MG/1
50 TABLET, DELAYED RELEASE ORAL 2 TIMES DAILY PRN
Qty: 60 TABLET | Refills: 1 | Status: SHIPPED | OUTPATIENT
Start: 2025-03-26

## 2025-03-26 RX ORDER — LEVOTHYROXINE SODIUM 150 UG/1
150 TABLET ORAL
Qty: 90 TABLET | Refills: 3 | Status: SHIPPED | OUTPATIENT
Start: 2025-03-26

## 2025-03-26 NOTE — TELEPHONE ENCOUNTER
Care Due:                  Date            Visit Type   Department     Provider  --------------------------------------------------------------------------------                                MYCHART                              FOLLOWUP/OF  [facility]  Last Visit: 11-      FICE VISIT   MEDICINE       Major Kimble                              MYCHART                              FOLLOWUP/[facility]  Next Visit: 05-      FICE VISIT   MEDICINE       Major Kimble                                                            Last  Test          Frequency    Reason                     Performed    Due Date  --------------------------------------------------------------------------------    Lipid Panel.  12 months..  atorvastatin.............  03- 03-    [facility] Embedded Care Due Messages. Reference number: [id number].   3/26/2025 12:39:39 PM CDT

## 2025-03-26 NOTE — TELEPHONE ENCOUNTER
LOV  11/06/2024    patient requesting refill for   levothyroxine (SYNTHROID) 150 MCG tablet   diclofenac (VOLTAREN) 50 MG EC tablet     RX pending   pharmacy confirmed  please advise

## 2025-06-05 ENCOUNTER — CLINICAL SUPPORT (OUTPATIENT)
Dept: FAMILY MEDICINE | Facility: CLINIC | Age: 65
End: 2025-06-05
Payer: MEDICARE

## 2025-06-05 ENCOUNTER — RESULTS FOLLOW-UP (OUTPATIENT)
Dept: FAMILY MEDICINE | Facility: CLINIC | Age: 65
End: 2025-06-05

## 2025-06-05 ENCOUNTER — OFFICE VISIT (OUTPATIENT)
Dept: FAMILY MEDICINE | Facility: CLINIC | Age: 65
End: 2025-06-05
Payer: MEDICARE

## 2025-06-05 ENCOUNTER — APPOINTMENT (OUTPATIENT)
Dept: RADIOLOGY | Facility: CLINIC | Age: 65
End: 2025-06-05
Attending: FAMILY MEDICINE
Payer: MEDICARE

## 2025-06-05 VITALS
BODY MASS INDEX: 45.93 KG/M2 | SYSTOLIC BLOOD PRESSURE: 136 MMHG | OXYGEN SATURATION: 97 % | HEART RATE: 87 BPM | RESPIRATION RATE: 18 BRPM | HEIGHT: 65 IN | DIASTOLIC BLOOD PRESSURE: 82 MMHG | WEIGHT: 275.69 LBS

## 2025-06-05 DIAGNOSIS — M47.812 FACET ARTHROPATHY, CERVICAL: ICD-10-CM

## 2025-06-05 DIAGNOSIS — M54.12 CERVICAL RADICULOPATHY: ICD-10-CM

## 2025-06-05 DIAGNOSIS — I10 PRIMARY HYPERTENSION: ICD-10-CM

## 2025-06-05 DIAGNOSIS — F41.9 ANXIETY: Primary | ICD-10-CM

## 2025-06-05 DIAGNOSIS — E66.01 MORBID OBESITY DUE TO EXCESS CALORIES: ICD-10-CM

## 2025-06-05 DIAGNOSIS — M47.816 LUMBAR FACET ARTHROPATHY: ICD-10-CM

## 2025-06-05 PROBLEM — F13.20 SEDATIVE, HYPNOTIC OR ANXIOLYTIC DEPENDENCE, UNCOMPLICATED: Status: RESOLVED | Noted: 2024-03-11 | Resolved: 2025-06-05

## 2025-06-05 LAB
ALBUMIN SERPL BCP-MCNC: 3.6 G/DL (ref 3.5–5.2)
ALP SERPL-CCNC: 132 UNIT/L (ref 40–150)
ALT SERPL W/O P-5'-P-CCNC: 17 UNIT/L (ref 10–44)
ANION GAP (OHS): 4 MMOL/L (ref 8–16)
AST SERPL-CCNC: 16 UNIT/L (ref 11–45)
BILIRUB SERPL-MCNC: 0.6 MG/DL (ref 0.1–1)
BUN SERPL-MCNC: 13 MG/DL (ref 8–23)
CALCIUM SERPL-MCNC: 9.5 MG/DL (ref 8.7–10.5)
CHLORIDE SERPL-SCNC: 105 MMOL/L (ref 95–110)
CHOLEST SERPL-MCNC: 163 MG/DL (ref 120–199)
CHOLEST/HDLC SERPL: 4.7 {RATIO} (ref 2–5)
CO2 SERPL-SCNC: 29 MMOL/L (ref 23–29)
CREAT SERPL-MCNC: 0.8 MG/DL (ref 0.5–1.4)
GFR SERPLBLD CREATININE-BSD FMLA CKD-EPI: >60 ML/MIN/1.73/M2
GLUCOSE SERPL-MCNC: 101 MG/DL (ref 70–110)
HDLC SERPL-MCNC: 35 MG/DL (ref 40–75)
HDLC SERPL: 21.5 % (ref 20–50)
LDLC SERPL CALC-MCNC: 86.8 MG/DL (ref 63–159)
NONHDLC SERPL-MCNC: 128 MG/DL
POTASSIUM SERPL-SCNC: 4.5 MMOL/L (ref 3.5–5.1)
PROT SERPL-MCNC: 7.6 GM/DL (ref 6–8.4)
SODIUM SERPL-SCNC: 138 MMOL/L (ref 136–145)
TRIGL SERPL-MCNC: 206 MG/DL (ref 30–150)

## 2025-06-05 PROCEDURE — 80053 COMPREHEN METABOLIC PANEL: CPT

## 2025-06-05 PROCEDURE — 72040 X-RAY EXAM NECK SPINE 2-3 VW: CPT | Mod: TC,PO

## 2025-06-05 PROCEDURE — 36415 COLL VENOUS BLD VENIPUNCTURE: CPT | Mod: S$GLB,,, | Performed by: FAMILY MEDICINE

## 2025-06-05 PROCEDURE — 4010F ACE/ARB THERAPY RXD/TAKEN: CPT | Mod: CPTII,S$GLB,, | Performed by: FAMILY MEDICINE

## 2025-06-05 PROCEDURE — 80061 LIPID PANEL: CPT

## 2025-06-05 PROCEDURE — 72040 X-RAY EXAM NECK SPINE 2-3 VW: CPT | Mod: 26,,, | Performed by: RADIOLOGY

## 2025-06-05 PROCEDURE — G2211 COMPLEX E/M VISIT ADD ON: HCPCS | Mod: S$GLB,,, | Performed by: FAMILY MEDICINE

## 2025-06-05 PROCEDURE — 3079F DIAST BP 80-89 MM HG: CPT | Mod: CPTII,S$GLB,, | Performed by: FAMILY MEDICINE

## 2025-06-05 PROCEDURE — 3008F BODY MASS INDEX DOCD: CPT | Mod: CPTII,S$GLB,, | Performed by: FAMILY MEDICINE

## 2025-06-05 PROCEDURE — 99999 PR PBB SHADOW E&M-EST. PATIENT-LVL III: CPT | Mod: PBBFAC,,, | Performed by: FAMILY MEDICINE

## 2025-06-05 PROCEDURE — 1159F MED LIST DOCD IN RCRD: CPT | Mod: CPTII,S$GLB,, | Performed by: FAMILY MEDICINE

## 2025-06-05 PROCEDURE — 1160F RVW MEDS BY RX/DR IN RCRD: CPT | Mod: CPTII,S$GLB,, | Performed by: FAMILY MEDICINE

## 2025-06-05 PROCEDURE — 99214 OFFICE O/P EST MOD 30 MIN: CPT | Mod: S$GLB,,, | Performed by: FAMILY MEDICINE

## 2025-06-05 PROCEDURE — 3075F SYST BP GE 130 - 139MM HG: CPT | Mod: CPTII,S$GLB,, | Performed by: FAMILY MEDICINE

## 2025-06-05 RX ORDER — PREGABALIN 75 MG/1
75 CAPSULE ORAL 2 TIMES DAILY
Qty: 60 CAPSULE | Refills: 6 | Status: SHIPPED | OUTPATIENT
Start: 2025-06-05 | End: 2025-12-04

## 2025-06-06 ENCOUNTER — TELEPHONE (OUTPATIENT)
Dept: PAIN MEDICINE | Facility: CLINIC | Age: 65
End: 2025-06-06
Payer: MEDICARE

## 2025-07-07 ENCOUNTER — E-VISIT (OUTPATIENT)
Dept: FAMILY MEDICINE | Facility: CLINIC | Age: 65
End: 2025-07-07
Payer: MEDICARE

## 2025-07-07 DIAGNOSIS — F51.04 INSOMNIA, PSYCHOPHYSIOLOGICAL: Primary | ICD-10-CM

## 2025-07-07 RX ORDER — SUVOREXANT 20 MG/1
1 TABLET, FILM COATED ORAL NIGHTLY
Qty: 30 TABLET | Refills: 5 | Status: SHIPPED | OUTPATIENT
Start: 2025-07-07

## 2025-07-08 ENCOUNTER — HOSPITAL ENCOUNTER (EMERGENCY)
Facility: HOSPITAL | Age: 65
Discharge: HOME | End: 2025-07-08
Attending: STUDENT IN AN ORGANIZED HEALTH CARE EDUCATION/TRAINING PROGRAM
Payer: MEDICARE

## 2025-07-08 VITALS
TEMPERATURE: 99 F | SYSTOLIC BLOOD PRESSURE: 146 MMHG | OXYGEN SATURATION: 95 % | DIASTOLIC BLOOD PRESSURE: 69 MMHG | WEIGHT: 268.5 LBS | BODY MASS INDEX: 44.73 KG/M2 | HEIGHT: 65 IN | HEART RATE: 72 BPM | RESPIRATION RATE: 20 BRPM

## 2025-07-08 DIAGNOSIS — M54.12 CERVICAL RADICULOPATHY: Primary | ICD-10-CM

## 2025-07-08 PROCEDURE — 99284 EMERGENCY DEPT VISIT MOD MDM: CPT | Mod: 25

## 2025-07-08 PROCEDURE — 20552 NJX 1/MLT TRIGGER POINT 1/2: CPT

## 2025-07-08 PROCEDURE — 63600175 PHARM REV CODE 636 W HCPCS: Performed by: STUDENT IN AN ORGANIZED HEALTH CARE EDUCATION/TRAINING PROGRAM

## 2025-07-08 PROCEDURE — 96372 THER/PROPH/DIAG INJ SC/IM: CPT | Performed by: STUDENT IN AN ORGANIZED HEALTH CARE EDUCATION/TRAINING PROGRAM

## 2025-07-08 RX ORDER — LIDOCAINE HYDROCHLORIDE 10 MG/ML
5 INJECTION, SOLUTION EPIDURAL; INFILTRATION; INTRACAUDAL; PERINEURAL
Status: COMPLETED | OUTPATIENT
Start: 2025-07-08 | End: 2025-07-08

## 2025-07-08 RX ORDER — DEXAMETHASONE SODIUM PHOSPHATE 4 MG/ML
8 INJECTION, SOLUTION INTRA-ARTICULAR; INTRALESIONAL; INTRAMUSCULAR; INTRAVENOUS; SOFT TISSUE
Status: COMPLETED | OUTPATIENT
Start: 2025-07-08 | End: 2025-07-08

## 2025-07-08 RX ORDER — KETOROLAC TROMETHAMINE 30 MG/ML
15 INJECTION, SOLUTION INTRAMUSCULAR; INTRAVENOUS
Status: COMPLETED | OUTPATIENT
Start: 2025-07-08 | End: 2025-07-08

## 2025-07-08 RX ADMIN — LIDOCAINE HYDROCHLORIDE 50 MG: 10 INJECTION, SOLUTION EPIDURAL; INFILTRATION; INTRACAUDAL at 09:07

## 2025-07-08 RX ADMIN — KETOROLAC TROMETHAMINE 15 MG: 30 INJECTION, SOLUTION INTRAMUSCULAR at 09:07

## 2025-07-08 RX ADMIN — DEXAMETHASONE SODIUM PHOSPHATE 8 MG: 4 INJECTION, SOLUTION INTRA-ARTICULAR; INTRALESIONAL; INTRAMUSCULAR; INTRAVENOUS; SOFT TISSUE at 09:07

## 2025-07-08 NOTE — ED PROVIDER NOTES
Encounter Date: 2025       History     Chief Complaint   Patient presents with    Neck Pain     65-year-old female with history of bipolar disorder, hypertension, prior stroke, presenting with right-sided neck pain.  Patient's suffers from chronic cervical radiculopathy, and has an appointment with pain management in two days.  Reports that the pain has been slowly worsening over the last three weeks, and she did not think she could last through the weekend.  No numbness or weakness in bilateral upper extremities.  Reports mild headache, but no difficulty swallowing or speaking.  No other complaints.  No trauma.      Review of patient's allergies indicates:  No Known Allergies  Past Medical History:   Diagnosis Date    Arthritis     Bipolar disorder     Depression     History of psychiatric care     History of psychiatric hospitalization     Hyperlipidemia     Hypertension     Insomnia     Lupus     Psychiatric problem     Self-harming behavior     Stroke     TIA - neck    Suicide attempt     Therapy     Thyroid disease      Past Surgical History:   Procedure Laterality Date    ADENOIDECTOMY      CARPAL TUNNEL RELEASE       SECTION      EAR TUBE REMOVAL Left 2024    EPIDURAL STEROID INJECTION INTO LUMBAR SPINE N/A 2019    Procedure: LUMBAR EPIDURAL STEROID INJECTION (L4-5 INTERLAMINAR);  Surgeon: Real Fitch Jr., MD;  Location: [facility] OR;  Service: Pain Management;  Laterality: N/A;    eustachian tuboplasty Left 2024    HYSTERECTOMY  1981    left total knee arthroplasty Left 2020    NASAL SEPTUM SURGERY  2014    Rt shoulder surgery Right 2004    scope    TONSILLECTOMY  2014    TYMPANOPLASTY Left 2024    UVULOPALATOPHARYNGOPLASTY  2014     Family History   Problem Relation Name Age of Onset    No Known Problems Mother      Cancer Father      Heart disease Sister 2     No Known Problems Brother 2     Heart disease Maternal Aunt      Heart disease Maternal  Uncle      Cancer Paternal Aunt      No Known Problems Paternal Uncle      No Known Problems Maternal Grandmother      Heart disease Maternal Grandfather      No Known Problems Paternal Grandmother      No Known Problems Paternal Grandfather      ADD / ADHD Neg Hx      Alcohol abuse Neg Hx      Anxiety disorder Neg Hx      Bipolar disorder Neg Hx      Depression Neg Hx      Drug abuse Neg Hx      OCD Neg Hx      Paranoid behavior Neg Hx      Schizophrenia Neg Hx      Self injury Neg Hx      Suicide Neg Hx      Breast cancer Neg Hx      Ovarian cancer Neg Hx      Colon cancer Neg Hx       Social History[1]  Review of Systems   Constitutional:  Negative for fever.   HENT:  Negative for sore throat.    Respiratory:  Negative for shortness of breath.    Cardiovascular:  Negative for chest pain.   Gastrointestinal:  Negative for nausea.   Genitourinary:  Negative for dysuria.   Musculoskeletal:  Positive for neck pain. Negative for back pain.   Skin:  Negative for rash.   Neurological:  Negative for weakness.   Hematological:  Does not bruise/bleed easily.       Physical Exam     Initial Vitals [07/08/25 0858]   BP Pulse Resp Temp SpO2   (!) 142/91 82 20 99.3 °F (37.4 °C) 97 %      MAP       --         Physical Exam    Nursing note and vitals reviewed.  Constitutional: She appears well-developed.   HENT:   Head: Normocephalic.   Eyes: Pupils are equal, round, and reactive to light.   Neck:   Normal range of motion.  Cardiovascular:            No murmur heard.  Pulmonary/Chest: No respiratory distress.   Abdominal: Abdomen is soft.   Musculoskeletal:         General: No edema.      Cervical back: Normal range of motion.      Comments: No midline cervical tenderness to palpation.  There is right-sided paraspinal cervical tenderness.    5/5 strength in bilateral upper extremities, 5/5  strength.     Neurological: She is alert.   Alert and oriented to person place and time. No facial droop. CN 2-12 intact. Fluent speech  with expression and comprehension. Strength 5/5 and sensation intact in upper and lower extremities.    Skin: Skin is warm.   Psychiatric: She has a normal mood and affect.         ED Course   Trigger Point Injection    Performed by: Yury Allen MD  Authorized by: Yury Allen MD    Cervical Paraspinal:  Right    Consent Done?:  Yes (Verbal)    Pre-Procedure:   Indications:  Pain relief   Timeout: prior to procedure the correct patient, procedure, and site was verified    Prep: patient was prepped and draped in usual sterile fashion     Local anesthesia used?: Yes    Anesthesia:  Local infiltration  Local anesthetic:  Lidocaine 1% without epinephrine    Labs Reviewed - No data to display       Imaging Results    None          Medications   ketorolac injection 15 mg (has no administration in time range)   LIDOcaine (PF) 10 mg/ml (1%) injection 50 mg (has no administration in time range)   dexAMETHasone injection 8 mg (has no administration in time range)     Medical Decision Making  Known cervical radiculopathy.  Will treat with local pain control with trigger point injections.  Will treat systemically with Toradol and Decadron.  Follow up with pain control in two days.  Do not suspect vertebral artery injury, migraine, stroke given lack of other neurologic symptoms.          Risk  Prescription drug management.                                      Clinical Impression:  Final diagnoses:  [M54.12] Cervical radiculopathy (Primary)                     Yury Allen MD  07/08/25 0911         [1]   Social History  Tobacco Use    Smoking status: Former     Current packs/day: 0.00     Types: Cigarettes     Quit date: 8/1/2009     Years since quitting: 15.9     Passive exposure: Past    Smokeless tobacco: Never   Substance Use Topics    Alcohol use: No    Drug use: Never        Yury Allen MD  07/08/25 0914

## 2025-07-08 NOTE — PROGRESS NOTES
Patient ID: Gricelda Gil is a 65 y.o. female.        E-Visit Time Tracking:             Chief Complaint: General Illness (Entered automatically based on patient selection in Identify.)      The patient initiated a request through Identify on 7/7/2025 for evaluation and management with a chief complaint of General Illness (Entered automatically based on patient selection in Identify.)     I evaluated the questionnaire submission on 07/07/2025.    [facility]    7/7/2025  5:38 AM CDT - Filed by Patient   What do you need help with? Insomnia   Do you agree to participate in an E-Visit? Yes   If you have any of the following symptoms, please go to the nearest emergency room or call 911: I acknowledge   What is the main issue you would like addressed today? Not sleeping   Please describe your symptoms. I cant sleep. If i do drift off its only for a few minutes. Nothing venus taken has worked.   Where is your problem located? Head, neck   On a scale of 1-10, where 10 is the worst you can imagine, how severe are your symptoms? (range: 1 - 10) 10   Have you had these symptoms before? Yes   How long have you been having these symptoms? (Just today, For a few days, For a week, For one to four weeks, For more than a month) More than a month   What helps with your symptoms? Nothing   What makes your symptoms feel worse? No rest   Are these symptoms related to a condition that you currently have? (Yes, No, Not sure) Yes   What condition do you currently have? Insomnia   When were you last seen for this condition?    Provide any information you feel is important to your history not asked above I need some rest   Please attach any relevant images or files    Are you able to take your vitals? No         Encounter Diagnosis   Name Primary?    Insomnia, psychophysiological Yes        No orders of the defined types were placed in this encounter.     Medications Ordered This Encounter   Medications     suvorexant (BELSOMRA) 20 mg Tab     Sig: Take 1 tablet by mouth every evening.     Dispense:  30 tablet     Refill:  5        No follow-ups on file.

## 2025-07-09 ENCOUNTER — PATIENT OUTREACH (OUTPATIENT)
Dept: ADMINISTRATIVE | Facility: HOSPITAL | Age: 65
End: 2025-07-09
Payer: MEDICARE

## 2025-07-09 DIAGNOSIS — Z12.31 BREAST CANCER SCREENING BY MAMMOGRAM: Primary | ICD-10-CM

## 2025-07-09 NOTE — PROGRESS NOTES
Care Coordination Encounter Details:       MyChart Portal Status:         [x]  Reviewed MyChart Portal Status offered / enrolled if applicable        Additional Notes:     MyChart Outcomes: Pt is enrolled & active          Updates Requested / Reviewed:        Updated Care Coordination Note, Care Everywhere, , External Sources: [facility] and [facility], Care Team Updated, Removed  or Duplicate Orders, and Immunizations Reconciliation Completed or Queried: [place]         Health Maintenance Screening(s) Due:      Health Maintenance Topics Overdue:  VBHM Score: 1     Mammogram    Pneumonia Vaccine  Tetanus Vaccine  Shingles/Zoster Vaccine  RSV Vaccine          Health Maintenance Topic(s) Outreach Outcomes & Actions Taken:    Breast Cancer Screening - Outreach Outcomes & Actions Taken  : Mammogram Order Placed,patient declined to schedule MMG.    Colorectal Cancer Screening - Outreach Outcomes & Actions Taken  : Cologuard up to date, next due 2026.    Blood Pressure - Outreach Outcomes & Actions Taken  : Patient states she has blood pressure monitor at home and currently enrolled in Digital Medicine Program.    Primary Care Appt - Outreach Outcomes & Actions Taken  : Primary Care Appt Scheduled for 12/10/2025.     Additional Notes:             Chronic Disease Management:     Diabetes Measures        Lab Results   Component Value Date    HGBA1C 5.6 2024           [x]  Reviewed chart for active Diabetes diagnosis     []  Scheduled necessary follow up appointments if needed         Additional Notes:  Patient listed on Prediabetes Registry.           Hypertension Measures        BP Readings from Last 1 Encounters:   25 (!) 146/69           [x]  Reviewed chart for active Hypertension diagnosis     [x]  Reviewed & documented Home BP Cuff     []  Documented a Remote BP if needed & applicable     []  Scheduled necessary follow up appointments with Primary Care if needed         Additional  Notes:             Provider Team Continuity:     Last PCP Visit Date: 7/7/2025          [x]  Reviewed Primary Care Provider Visits, Annual Wellness Visit, and Future          Appointments to ensure appointments have been scheduled and/or           completed        Additional Notes:  Patient eligible for Enhanced Annual Wellness Visit, unable to schedule at this time due to no available appointments. Patient informed, can contact insurance company to schedule.             Social Determinants of Health          [x]  Reviewed, completed, and/or updated the following sections:                  Food Insecurity, Transportation Needs, Financial Resource Strain,                 Tobacco Use        Additional Notes:  SDOH reviewed 4/30/2025.           Care Management, Digital Medicine, and/or Education Referrals    OPCM Risk Score: 46.8         Next Steps - Referral Actions: Patient declined OPCM referral.        Additional Notes:  Patient seen in ER 7/8/2025 for neck pain, patient states she has follow up appointment with her pain management provider on 7/10/2025.

## 2025-07-10 ENCOUNTER — OFFICE VISIT (OUTPATIENT)
Dept: PAIN MEDICINE | Facility: CLINIC | Age: 65
End: 2025-07-10
Payer: MEDICARE

## 2025-07-10 VITALS
HEART RATE: 69 BPM | OXYGEN SATURATION: 93 % | WEIGHT: 271 LBS | BODY MASS INDEX: 45.15 KG/M2 | HEIGHT: 65 IN | SYSTOLIC BLOOD PRESSURE: 123 MMHG | DIASTOLIC BLOOD PRESSURE: 70 MMHG

## 2025-07-10 DIAGNOSIS — M47.812 ARTHROPATHY OF CERVICAL FACET JOINT: ICD-10-CM

## 2025-07-10 DIAGNOSIS — G89.4 CHRONIC PAIN SYNDROME: ICD-10-CM

## 2025-07-10 DIAGNOSIS — M54.9 DORSALGIA, UNSPECIFIED: ICD-10-CM

## 2025-07-10 DIAGNOSIS — G89.29 CHRONIC BILATERAL LOW BACK PAIN WITHOUT SCIATICA: ICD-10-CM

## 2025-07-10 DIAGNOSIS — M54.2 CHRONIC NECK PAIN: Primary | ICD-10-CM

## 2025-07-10 DIAGNOSIS — G89.29 CHRONIC NECK PAIN: Primary | ICD-10-CM

## 2025-07-10 DIAGNOSIS — M47.816 LUMBAR FACET ARTHROPATHY: ICD-10-CM

## 2025-07-10 DIAGNOSIS — M54.50 CHRONIC BILATERAL LOW BACK PAIN WITHOUT SCIATICA: ICD-10-CM

## 2025-07-10 DIAGNOSIS — M25.78 DEGENERATION OF INTERVERTEBRAL DISC OF CERVICAL REGION WITH OSTEOPHYTE OF CERVICAL VERTEBRA: ICD-10-CM

## 2025-07-10 DIAGNOSIS — M51.360 DEGENERATION OF INTERVERTEBRAL DISC OF LUMBAR REGION WITH DISCOGENIC BACK PAIN: ICD-10-CM

## 2025-07-10 DIAGNOSIS — M50.30 DEGENERATION OF INTERVERTEBRAL DISC OF CERVICAL REGION WITH OSTEOPHYTE OF CERVICAL VERTEBRA: ICD-10-CM

## 2025-07-10 PROCEDURE — 99999 PR PBB SHADOW E&M-EST. PATIENT-LVL V: CPT | Mod: PBBFAC,,, | Performed by: ANESTHESIOLOGY

## 2025-07-10 NOTE — PROGRESS NOTES
New Patient Evaluation  [facility]    Gricelda Gil  : 1960  Date: 7/10/2025     CHIEF COMPLAINT:  No chief complaint on file.    Referring Physician: Major Kimble MD  Primary Care Physician: Major Kimble MD    HPI:  This is a 65 y.o. female with a chief complaint of No chief complaint on file.  . The patient has Past medical history/Past surgical history of  cervical radiculopathy, cervical facet arthropathy, lumbar degenerative disc disease, cervical degenerative disc disease, history of TIA, history of stroke, anxiety, depression, bipolar, hypertension, hypothyroidism, morbid obesity, BMI of 44    Patient was evaluated and referred by  primary care provider for cervical radiculopathy   Patient was previously seen  for low back and neck pain, patient had previous lumbar epidural and cervical epidural steroid injection    Diabetic: {GAYes/No/NA:08484}    {Anticoagulation medications:01623}    Allergy To Iodine: {GAYes/No/NA:61417}    Currently on Antibiotic: {GAYes/No/NA:42251}    Pain Disability Index Review:      2019    11:45 AM 2019    11:07 AM 2019    11:55 AM   Last 3 PDI Scores   Pain Disability Index (PDI) 18 19 17       Current Description of Pain Symptoms:    History of Recent Fall or Trauma: {GAYes/No/NA:23365}   Onset: Chronic, started ***  Pain Location: ***  Radiates/associated symptoms: ***.   Pain is Getting worse over the last *** months   The pain is {Intermittent/Continuous:87497}.   The pain is described as {Desc; pain character:42094}.   Exacerbating factors: {Causes; Pain:22123}.   Mitigating factors ***.   Symptoms interfere with daily activity, sleeping, and ***.   The patient feels like symptoms have been {IUW:02098}.   Patient {Denies / Reports:17115} {RED FLAGS:35634}.    Pain score:   Current: {PAIN 0-10:67073}/10  Best: {PAIN 0-10:69356}/10  Worst: {PAIN 0-10:34455}/10    Current pain medication:   Voltaren    Cymbalta      Current Narcotics/Opioid /benzo Medications:  Opioids- {GAopioid:66401}  Benzodiazepines: {GAYes/No/NA:62827}    UDS:  NA    PDMP:  Reviewed and consistent with medication use as prescribed.     Previous Chronic Pain Treatment History:  Six weeks of conservative therapy include: Physical Therapy/HEP/Physician Lead Exercise Program:  Over the past 12 months, Patient has done  *** sessions.  PT response: {PT response:18963} Helpful.   Dates of the PT sessions: ***, ***.  Is patient actively participating in home exercise program (HEP)/ physician led exercise program in the last 6 months: {GAYes/No/NA:90319}.    Non-interventional Pain Therapy:  []Chiropractor.   []Acupuncture/Dry needle.  []TENS unit.  []Heat/ICE.  []Back Brace.    Medications previously tried:  NSAIDs: {GANSIAD:35157}  Topical Agent: {GAYes/No/NA:99512}  TCA/SSRI/SNRI: {GATCA/SSRI/SNRI:49373}  Anti-convulsants: {GAAnticonvulsants:73418}  Muscle Relaxants: {GAmuscle Relaxant:22826}  Opioids- {GAopioid:47492}.    Interventional Pain Procedures:   19 Lumbar Interlaminar Epidural Steroid Injection under Fluoroscopic Guidance  16 C7-T1 CERVICAL EPIDURAL STEROID INJECTION   16 C7-T1 CERVICAL EPIDURAL STEROID INJECTION,   12, 11, 10/5/11, 11, 3/11/10 Cervical Epidural Steroid Injection    Previous spine/Relevant joint surgery:  ***  Surgical History:   has a past surgical history that includes Rt shoulder surgery (Right, ); Uvulopalatopharyngoplasty (); Nasal septum surgery (); Hysterectomy (); Tonsillectomy (); Adenoidectomy;  section (); Carpal tunnel release (); Epidural steroid injection into lumbar spine (N/A, 2019); left total knee arthroplasty (Left, 2020); Ear tube removal (Left, 2024); Tympanoplasty (Left, 2024); and eustachian tuboplasty (Left, 2024).  Medical History:   has a past medical history of Arthritis, Bipolar disorder,  Depression, History of psychiatric care, History of psychiatric hospitalization, Hyperlipidemia, Hypertension, Insomnia, Lupus, Psychiatric problem, Self-harming behavior, Stroke (2011), Suicide attempt, Therapy, and Thyroid disease.  Family History:  family history includes Cancer in her father and paternal aunt; Heart disease in her maternal aunt, maternal grandfather, maternal uncle, and sister; No Known Problems in her brother, maternal grandmother, mother, paternal grandfather, paternal grandmother, and paternal uncle.  Allergies:  Patient has no known allergies.   Social History/SUBSTANCE ABUSE HISTORY:   reports that she quit smoking about 15 years ago. Her smoking use included cigarettes. She has been exposed to tobacco smoke. She has never used smokeless tobacco. She reports that she does not drink alcohol and does not use drugs.  LABS:  CBC  Lab Results   Component Value Date    WBC 8.16 12/04/2024    HGB 14.1 12/04/2024    HCT 42.5 12/04/2024     Coagulation Profile   Lab Results   Component Value Date     12/04/2024       Lab Results   Component Value Date    INR 0.9 08/06/2018     CMP:  BMP  Lab Results   Component Value Date     06/05/2025    K 4.5 06/05/2025     06/05/2025    CO2 29 06/05/2025    BUN 13 06/05/2025    CREATININE 0.8 06/05/2025    CALCIUM 9.5 06/05/2025    ANIONGAP 4 (L) 06/05/2025    EGFRNORACEVR >60 06/05/2025     Lab Results   Component Value Date    ALT 17 06/05/2025    AST 16 06/05/2025    ALKPHOS 132 06/05/2025    BILITOT 0.6 06/05/2025     HGBA1C:  Lab Results   Component Value Date    HGBA1C 5.6 12/04/2024       ROS:    Review of Systems   GENERAL:  No weight loss, malaise or fevers.  HEENT:   No recent changes in vision or hearing  NECK:  Negative for lumps, no difficulty with swallowing.  RESPIRATORY:  Negative for cough, wheezing or shortness of breath, patient denies any recent URI.  CARDIOVASCULAR:  Negative for chest pain or palpitations.  GI:  Negative  for abdominal discomfort, blood in stools or black stools or change in bowel habits.  MUSCULOSKELETAL:  See HPI.  SKIN:  Negative for lesions, rash, and itching.  PSYCH:  No mood disorder or recent psychosocial stressors.   HEMATOLOGY/LYMPHOLOGY:  See the blood thinner sectioned in HPI.  NEURO:  See HPI  All other reviewed and negative other than HPI.    PHYSICAL EXAM:  VITALS: There were no vitals taken for this visit.  There is no height or weight on file to calculate BMI.  GENERAL: Well appearing, in no acute distress, alert and oriented x3, answers questions appropriately.   PSYCH: Flat affect.  SKIN: Skin color, texture, turgor normal, no rashes or lesions.  HEAD/FACE:  Normocephalic, atraumatic. Cranial nerves grossly intact.  CV: Regular rate  PULM: No evidence of respiratory difficulty, symmetric chest rise.  GI:  Soft and non-Distended.  NECK: ({GA+:61638}) pain to palpation over the cervical paraspinous muscles. Spurling:{GA+:46637}. ({GA+:95768}) pain with neck flexion, extension, or lateral flexion, Muscle strength in RT UE ***/5 and Left UE ***/5, Right Hand  ***/5, Left Hand ***/5  BACK/SIJ/HIP:  Lumbar Spine Exam:       Inspection: No erythema, bruising.       Palpation: ({GA+:09399}) TTP of lumbar paraspinals bilaterally      ROM:  Limited in flexion, extension, lateral bending.       ({GA+:02610}) Facet loading {GAHip:32923}      ({GA+:70475}) Straight Leg Raise, {GAHip:73871}      ({GA+:60209}) JARRETT, Tenderness over the PSIS, Yeoman test, {GAHip:95273}  Hip Exam:      Inspection: No gross deformity or apparent leg length discrepancy      Palpation:  No TTP to bilateral greater trochanteric bursas.       ROM:  *** limitation Due to pain in internal rotation, external rotation b/l  Neurologic Exam:     Alert. Speech is fluent and appropriate.     Strength: ***/5 in {GAHip:72943} hip flexion and knee extension     Sensation:  Grossly intact to light touch in bilateral lower extremities      Tone: No abnormality appreciated in bilateral lower extremities  Knee exam:  No gross deformity or apparent leg length discrepancy, positive tenderness over the anteromedial aspect of the knee cap, positive limitation due to pain in flexion and extension, sensation  grossly intact to light touch in bilateral lower extremity, no atrophy or tone abnormalities    GAIT: {GAgait:31729}    DIAGNOSTIC STUDIES AND MEDICAL RECORDS REVIEW:  I have personally reviewed and interpreted relevant radiology reports and reviewed relevant records from other services in the EMR.   -  MRI cervical spine 2016  Vertebral body alignment is maintained with straightening of the normal cervical lordosis. Vertebral body heights are within normal limits. There is disc desiccation throughout the cervical spine with disc space narrowing. The marrow signal is normal without evidence for a marrow placement process, infection or tumor.     At C2-3, no disk herniation, central canal stenosis or neural foraminal narrowing.     At C3-4, no disk herniation, central canal stenosis or neural foraminal narrowing.     At C4-5, no disk herniation, central canal stenosis or neural foraminal narrowing.     At C5-6, there is bilateral uncovertebral spurring without central canal stenosis neural foraminal narrowing.     At C6-7, there is a posterior disc osteophyte complex with bilateral uncovertebral spurring contributing to mild left-sided neural foraminal narrowing.     At C7-T1, no disk herniation, central canal stenosis or neural foraminal narrowing.  IMPRESSION:         Mild spondylosis of the cervical spine contribute to a most left-sided neural foraminal narrowing at C6-7. No central canal stenosis.  Clinical Impression:  This is a pleasant 65 y.o. female patient with PMH/PSH of ***, presenting with***.     We discussed the underlying diagnoses and multiple treatment options including non-opioid medications, interventional procedures, physical therapy,  "home exercise, core muscle enhancement, and weight loss.  The risks and benefits of each treatment option were discussed and all questions were answered.      Encounter Diagnosis:  Gricelda Gil is a 65 y.o. female with the following diagnoses based on history, exam, and imaging:  There are no diagnoses linked to this encounter.     Treatment Plan:    Diagnostics/Referrals: X-ray cervical spine flexion-extension and MRI cervical spine    Medications:    NSAIDs: {GANSIAD:53358}  Topical Agent: {GAYes/No/NA:59846}  TCA/SSRI/SNRI: {GATCA/SSRI/SNRI:26997}  Anti-convulsants: {GAAnticonvulsants:14941}  Muscle Relaxants: {GAmuscle Relaxant:07604}  Opioids: {GAopioid:12191::"None"}  Patient was educated about the risk and benefit of chronic opioid therapy including dependency, addiction, diversion, and opioid hyperalgesia.  Interventional Therapy: {GAProcedure:79307}.  Sedation: {GAsedation:62485}.  {Anticoagulation medications:35339} -Clearance to stop Blood thinner: {GAYes/No/NA:84078}    Regarding the above interventions, the patient has been educated regarding the risks (including bleeding, infection, increased pain, nerve damage, or allergic reaction), benefits, and alternatives. The patient states she understands and is eager to proceed.    Physical Rehabilitation: {GAPT:19335}    Patient Education: Counseled patient regarding the importance of {:15102}, I have stressed the importance of physical activity and a home exercise plan to help with pain and improve health.    Follow-up: RTC ***.    May consider:     I would like to thank Major Kimble MD for the opportunity to assist in the care of this patient. We had a very nice visit and I look forward to continuing their care. Please let me know if I can be of further assistance.     Nancy Zelaya MD  Anesthesiologist  Interventional Pain Medicine  07/10/2025    Disclaimer:  This note was prepared using voice recognition system and is likely to " have sound alike errors that may have been overlooked even after proof reading.  Please call me with any questions.

## 2025-07-10 NOTE — PROGRESS NOTES
New Patient Evaluation  [facility]    Gricelda Gil  : 1960  Date: 7/10/2025     CHIEF COMPLAINT:  Neck Pain and Shoulder Pain    Referring Physician: Major Kimble MD  Primary Care Physician: Major Kimble MD    HPI:  This is a 65 y.o. female with a chief complaint of Neck Pain and Shoulder Pain  . The patient has Past medical history/Past surgical history of  cervical radiculopathy, cervical facet arthropathy, lumbar degenerative disc disease, cervical degenerative disc disease, history of TIA, history of stroke, anxiety, depression, bipolar, hypertension, hypothyroidism, morbid obesity, BMI of 44    Patient was evaluated and referred by  primary care provider for cervical radiculopathy  Patient was previously seen  for low back and neck pain, patient had previous lumbar epidural and cervical epidural steroid injections    Diabetic: No    Anticoagulation medications: None    Allergy To Iodine: No    Currently on Antibiotic: No    Pain Disability Index Review:      7/10/2025     1:31 PM 2019    11:45 AM 2019    11:07 AM   Last 3 PDI Scores   Pain Disability Index (PDI) 19 18 19       Current Description of Pain Symptoms:    History of Recent Fall or Trauma: No   Onset: Chronic, started on going   Pain Location: neck, mostly axial  in addition to axial low back pain: worse with standing    Radiates/associated symptoms: shoulder blade  Pain is Getting worse over the last 3 months   The pain is intermittent.   The pain is described as aching, burning, sharp, shooting, stabbing, throbbing, and stiffness  Exacerbating factors: Sitting, Standing, Laying, and Walking.   Mitigating factors N/A.   Symptoms interfere with daily activity, sleeping.   The patient feels like symptoms have been worsening.   Patient denies night fever/night sweats, urinary incontinence, bowel incontinence, significant weight loss, significant motor weakness, and loss of  sensations.    Pain score:  Current: 4/10  Best: 4/10  Worst: 10/10    Current pain medication:   Voltaren 50mg, BID  Cymbalta 60 QHS   Lyrica 75mg, BID  Current Narcotics/Opioid /benzo Medications:  Opioids- None  Benzodiazepines: No    UDS:  NA    PDMP:  Reviewed and consistent with medication use as prescribed.     Previous Chronic Pain Treatment History:  Six weeks of conservative therapy include: Physical Therapy/HEP/Physician Lead Exercise Program:  Over the past 12 months, Patient has done  0 sessions.  Is patient actively participating in home exercise program (HEP)/ physician led exercise program in the last 6 months: Yes.    Non-interventional Pain Therapy:  []Chiropractor.   []Acupuncture/Dry needle.  []TENS unit.  [x]Heat/ICE.  []Back Brace.    Medications previously tried:  NSAIDs: Ibuprofen (Advil/Motrin) and Diclofenac  Topical Agent: Yes  TCA/SSRI/SNRI: SNRI: Duloxetine (Cymbalta)   Anti-convulsants: Gabapentin , Lyrica , and Lamotrigine   Muscle Relaxants: Flexeril (Cyclobenzaprine) and Tizanidine (Zanaflex)  Opioids- None.    Interventional Pain Procedures:  2025- TPI- 70% relief, Done in ER   19 Lumbar Interlaminar Epidural Steroid Injection under Fluoroscopic Guidance  16 C7-T1 CERVICAL EPIDURAL STEROID INJECTION   16 C7-T1 CERVICAL EPIDURAL STEROID INJECTION,   12, 11, 10/5/11, 11, 3/11/10 Cervical Epidural Steroid Injection    Previous spine/Relevant joint surgery:  Left total knee replacement-   Surgical History:   has a past surgical history that includes Rt shoulder surgery (Right, ); Uvulopalatopharyngoplasty (); Nasal septum surgery (); Hysterectomy (); Tonsillectomy (); Adenoidectomy;  section (); Carpal tunnel release (); Epidural steroid injection into lumbar spine (N/A, 2019); left total knee arthroplasty (Left, 2020); Ear tube removal (Left, 2024); Tympanoplasty (Left, 2024); and  eustachian tuboplasty (Left, 04/08/2024).  Medical History:   has a past medical history of Arthritis, Bipolar disorder, Depression, History of psychiatric care, History of psychiatric hospitalization, Hyperlipidemia, Hypertension, Insomnia, Lupus, Psychiatric problem, Self-harming behavior, Stroke (2011), Suicide attempt, Therapy, and Thyroid disease.  Family History:  family history includes Cancer in her father and paternal aunt; Heart disease in her maternal aunt, maternal grandfather, maternal uncle, and sister; No Known Problems in her brother, maternal grandmother, mother, paternal grandfather, paternal grandmother, and paternal uncle.  Allergies:  Patient has no known allergies.   Social History/SUBSTANCE ABUSE HISTORY:   reports that she quit smoking about 15 years ago. Her smoking use included cigarettes. She has been exposed to tobacco smoke. She has never used smokeless tobacco. She reports that she does not drink alcohol and does not use drugs.  LABS:  CBC  Lab Results   Component Value Date    WBC 8.16 12/04/2024    HGB 14.1 12/04/2024    HCT 42.5 12/04/2024     Coagulation Profile   Lab Results   Component Value Date     12/04/2024       Lab Results   Component Value Date    INR 0.9 08/06/2018     CMP:  BMP  Lab Results   Component Value Date     06/05/2025    K 4.5 06/05/2025     06/05/2025    CO2 29 06/05/2025    BUN 13 06/05/2025    CREATININE 0.8 06/05/2025    CALCIUM 9.5 06/05/2025    ANIONGAP 4 (L) 06/05/2025    EGFRNORACEVR >60 06/05/2025     Lab Results   Component Value Date    ALT 17 06/05/2025    AST 16 06/05/2025    ALKPHOS 132 06/05/2025    BILITOT 0.6 06/05/2025     HGBA1C:  Lab Results   Component Value Date    HGBA1C 5.6 12/04/2024       ROS:    Review of Systems   GENERAL:  No weight loss, malaise or fevers.  HEENT:   No recent changes in vision or hearing  NECK:  Negative for lumps, no difficulty with swallowing.  RESPIRATORY:  Negative for cough, wheezing or  "shortness of breath, patient denies any recent URI.  CARDIOVASCULAR:  Negative for chest pain or palpitations.  GI:  Negative for abdominal discomfort, blood in stools or black stools or change in bowel habits.  MUSCULOSKELETAL:  See HPI.  SKIN:  Negative for lesions, rash, and itching.  PSYCH:  No mood disorder or recent psychosocial stressors.   HEMATOLOGY/LYMPHOLOGY:  See the blood thinner sectioned in HPI.  NEURO:  See HPI  All other reviewed and negative other than HPI.    PHYSICAL EXAM:  VITALS: /70 (BP Location: Left arm, Patient Position: Sitting)   Pulse 69   Ht 5' 5" (1.651 m)   Wt 122.9 kg (271 lb)   SpO2 (!) 93%   BMI 45.10 kg/m²   Body mass index is 45.1 kg/m².  GENERAL: Well appearing, in no acute distress, alert and oriented x3, answers questions appropriately.   PSYCH: Flat affect.  SKIN: Skin color, texture, turgor normal, no rashes or lesions.  HEAD/FACE:  Normocephalic, atraumatic. Cranial nerves grossly intact.  CV: Regular rate  PULM: No evidence of respiratory difficulty, symmetric chest rise.  GI:  Soft and non-Distended.  NECK: (+++) pain to palpation over the cervical paraspinous muscles. Spurling:Negative. (+++) pain with neck flexion, extension, or lateral flexion, Muscle strength in RT and  UE 5/5   GAIT:  normal gait    DIAGNOSTIC STUDIES AND MEDICAL RECORDS REVIEW:  I have personally reviewed and interpreted relevant radiology reports and reviewed relevant records from other services in the EMR.   -  MRI cervical spine 2016  Vertebral body alignment is maintained with straightening of the normal cervical lordosis. Vertebral body heights are within normal limits. There is disc desiccation throughout the cervical spine with disc space narrowing. The marrow signal is normal without evidence for a marrow placement process, infection or tumor.     At C2-3, no disk herniation, central canal stenosis or neural foraminal narrowing.     At C3-4, no disk herniation, central canal " stenosis or neural foraminal narrowing.     At C4-5, no disk herniation, central canal stenosis or neural foraminal narrowing.     At C5-6, there is bilateral uncovertebral spurring without central canal stenosis neural foraminal narrowing.     At C6-7, there is a posterior disc osteophyte complex with bilateral uncovertebral spurring contributing to mild left-sided neural foraminal narrowing.     At C7-T1, no disk herniation, central canal stenosis or neural foraminal narrowing.  IMPRESSION:         Mild spondylosis of the cervical spine contribute to a most left-sided neural foraminal narrowing at C6-7. No central canal stenosis.  Clinical Impression:  This is a pleasant 65 y.o. female patient with PMH/PSH of cervical radiculopathy, cervical facet arthropathy, lumbar degenerative disc disease, cervical degenerative disc disease, history of TIA, history of stroke, anxiety, depression, bipolar, hypertension, hypothyroidism, morbid obesity, BMI of 44, presenting with axial neck and low back pain worsening over the last 3 months, patient had previous lumbar and cervical epidural steroid injection with limited relief, declined formal physical therapy, she has completed more than 6 weeks of home exercise, she takes Lyrica in addition to Cymbalta.    Encounter Diagnosis:  Gricelda Gil is a 65 y.o. female with the following diagnoses based on history, exam, and imagin. Chronic neck pain  - Ambulatory Referral/Consult to Physical Therapy; Future    2. Chronic bilateral low back pain without sciatica    3. Lumbar facet arthropathy  - Ambulatory referral/consult to Pain Clinic  - X-Ray Lumbar Complete Including Flex And Ext; Future  - Ambulatory Referral/Consult to Physical Therapy; Future    4. Arthropathy of cervical facet joint    5. Degeneration of intervertebral disc of cervical region with osteophyte of cervical vertebra  - MRI Cervical Spine Without Contrast; Future  - X-Ray Cervical Spine AP Lat with  Flex Ex; Future    6. Degeneration of intervertebral disc of lumbar region with discogenic back pain    7. Dorsalgia, unspecified  - MRI Lumbar Spine Without Contrast; Future    8. Chronic pain syndrome     Treatment Plan:    Diagnostics/Referrals: X-ray cervical spine flexion-extension and MRI cervical spine+   x-ray and MRI lumbar spine    Medications:     Continue as prescribed   Voltaren 50mg, BID  Cymbalta 60 QHS   Lyrica 75mg, BID    Interventional Therapy: Procedure based on MRI images.  Sedation: NA.  Anticoagulation medications: None -Clearance to stop Blood thinner: NA      Physical Rehabilitation: Physician led exercise program and home exercise    Patient Education: Counseled patient regarding the importance of activity modification, I have stressed the importance of physical activity and a home exercise plan to help with pain and improve health.    Follow-up: RTC  to discuss images.    May consider: CMBB.LMBB, ONB    I would like to thank Major Kimble MD for the opportunity to assist in the care of this patient. We had a very nice visit and I look forward to continuing their care. Please let me know if I can be of further assistance.     Nancy Zelaya MD  Anesthesiologist  Interventional Pain Medicine  07/10/2025    Disclaimer:  This note was prepared using voice recognition system and is likely to have sound alike errors that may have been overlooked even after proof reading.  Please call me with any questions.

## 2025-07-24 ENCOUNTER — HOSPITAL ENCOUNTER (OUTPATIENT)
Dept: RADIOLOGY | Facility: HOSPITAL | Age: 65
Discharge: HOME | End: 2025-07-24
Attending: ANESTHESIOLOGY
Payer: MEDICARE

## 2025-07-24 DIAGNOSIS — M50.30 DEGENERATION OF INTERVERTEBRAL DISC OF CERVICAL REGION WITH OSTEOPHYTE OF CERVICAL VERTEBRA: ICD-10-CM

## 2025-07-24 DIAGNOSIS — M47.816 LUMBAR FACET ARTHROPATHY: ICD-10-CM

## 2025-07-24 DIAGNOSIS — M25.78 DEGENERATION OF INTERVERTEBRAL DISC OF CERVICAL REGION WITH OSTEOPHYTE OF CERVICAL VERTEBRA: ICD-10-CM

## 2025-07-24 DIAGNOSIS — M54.9 DORSALGIA, UNSPECIFIED: ICD-10-CM

## 2025-07-24 PROCEDURE — 72148 MRI LUMBAR SPINE W/O DYE: CPT | Mod: TC

## 2025-07-24 PROCEDURE — 72114 X-RAY EXAM L-S SPINE BENDING: CPT | Mod: 26,,, | Performed by: RADIOLOGY

## 2025-07-24 PROCEDURE — 72141 MRI NECK SPINE W/O DYE: CPT | Mod: TC

## 2025-07-24 PROCEDURE — 72148 MRI LUMBAR SPINE W/O DYE: CPT | Mod: 26,,, | Performed by: RADIOLOGY

## 2025-07-24 PROCEDURE — 72114 X-RAY EXAM L-S SPINE BENDING: CPT | Mod: TC

## 2025-07-24 PROCEDURE — 72050 X-RAY EXAM NECK SPINE 4/5VWS: CPT | Mod: TC

## 2025-07-24 PROCEDURE — 72050 X-RAY EXAM NECK SPINE 4/5VWS: CPT | Mod: 26,,, | Performed by: RADIOLOGY

## 2025-07-24 PROCEDURE — 72141 MRI NECK SPINE W/O DYE: CPT | Mod: 26,,, | Performed by: RADIOLOGY

## 2025-07-25 ENCOUNTER — TELEPHONE (OUTPATIENT)
Dept: PAIN MEDICINE | Facility: CLINIC | Age: 65
End: 2025-07-25
Payer: MEDICARE

## 2025-07-25 NOTE — TELEPHONE ENCOUNTER
----- Message from Nancy Zelaya MD sent at 7/25/2025  9:09 AM CDT -----  Please get her earlier appointment to discuss images

## 2025-07-28 ENCOUNTER — TELEPHONE (OUTPATIENT)
Dept: FAMILY MEDICINE | Facility: CLINIC | Age: 65
End: 2025-07-28
Payer: MEDICARE

## 2025-07-31 ENCOUNTER — OFFICE VISIT (OUTPATIENT)
Dept: PAIN MEDICINE | Facility: CLINIC | Age: 65
End: 2025-07-31
Payer: MEDICARE

## 2025-07-31 ENCOUNTER — TELEPHONE (OUTPATIENT)
Dept: PAIN MEDICINE | Facility: CLINIC | Age: 65
End: 2025-07-31

## 2025-07-31 VITALS
OXYGEN SATURATION: 94 % | DIASTOLIC BLOOD PRESSURE: 80 MMHG | HEART RATE: 83 BPM | WEIGHT: 259 LBS | BODY MASS INDEX: 43.15 KG/M2 | SYSTOLIC BLOOD PRESSURE: 158 MMHG | HEIGHT: 65 IN

## 2025-07-31 DIAGNOSIS — G89.29 CHRONIC NECK PAIN: Primary | ICD-10-CM

## 2025-07-31 DIAGNOSIS — M54.12 CERVICAL RADICULOPATHY: Primary | ICD-10-CM

## 2025-07-31 DIAGNOSIS — M25.78 DEGENERATION OF INTERVERTEBRAL DISC OF CERVICAL REGION WITH OSTEOPHYTE OF CERVICAL VERTEBRA: ICD-10-CM

## 2025-07-31 DIAGNOSIS — M54.2 CHRONIC NECK PAIN: Primary | ICD-10-CM

## 2025-07-31 DIAGNOSIS — M50.30 DEGENERATION OF INTERVERTEBRAL DISC OF CERVICAL REGION WITH OSTEOPHYTE OF CERVICAL VERTEBRA: ICD-10-CM

## 2025-07-31 DIAGNOSIS — M51.360 DEGENERATION OF INTERVERTEBRAL DISC OF LUMBAR REGION WITH DISCOGENIC BACK PAIN: ICD-10-CM

## 2025-07-31 DIAGNOSIS — M54.51 VERTEBROGENIC LOW BACK PAIN: ICD-10-CM

## 2025-07-31 DIAGNOSIS — M47.816 LUMBAR FACET ARTHROPATHY: ICD-10-CM

## 2025-07-31 DIAGNOSIS — M47.812 ARTHROPATHY OF CERVICAL FACET JOINT: ICD-10-CM

## 2025-07-31 PROCEDURE — 1160F RVW MEDS BY RX/DR IN RCRD: CPT | Mod: CPTII,S$GLB,, | Performed by: ANESTHESIOLOGY

## 2025-07-31 PROCEDURE — 3008F BODY MASS INDEX DOCD: CPT | Mod: CPTII,S$GLB,, | Performed by: ANESTHESIOLOGY

## 2025-07-31 PROCEDURE — 4010F ACE/ARB THERAPY RXD/TAKEN: CPT | Mod: CPTII,S$GLB,, | Performed by: ANESTHESIOLOGY

## 2025-07-31 PROCEDURE — G2211 COMPLEX E/M VISIT ADD ON: HCPCS | Mod: S$GLB,,, | Performed by: ANESTHESIOLOGY

## 2025-07-31 PROCEDURE — 1159F MED LIST DOCD IN RCRD: CPT | Mod: CPTII,S$GLB,, | Performed by: ANESTHESIOLOGY

## 2025-07-31 PROCEDURE — 99999 PR PBB SHADOW E&M-EST. PATIENT-LVL III: CPT | Mod: PBBFAC,,, | Performed by: ANESTHESIOLOGY

## 2025-07-31 PROCEDURE — 3079F DIAST BP 80-89 MM HG: CPT | Mod: CPTII,S$GLB,, | Performed by: ANESTHESIOLOGY

## 2025-07-31 PROCEDURE — 3077F SYST BP >= 140 MM HG: CPT | Mod: CPTII,S$GLB,, | Performed by: ANESTHESIOLOGY

## 2025-07-31 PROCEDURE — 99214 OFFICE O/P EST MOD 30 MIN: CPT | Mod: S$GLB,,, | Performed by: ANESTHESIOLOGY

## 2025-07-31 NOTE — TELEPHONE ENCOUNTER
----- Message from Nancy Zelaya MD sent at 7/31/2025  1:51 PM CDT -----  · Interventional Therapy:  Please schedule for Cervical epidural steroid injection at C7-T1.   · Sedation: oral  · Anticoagulation medications: None -Clearance to stop Blood thinner: NA  4 weeks

## 2025-07-31 NOTE — PROGRESS NOTES
Establish Patient Evaluation  [facility]    Gricelda Gil  : 1960  Date: 2025     CHIEF COMPLAINT:  No chief complaint on file.    Referring Physician: No ref. provider found  Primary Care Physician: Major Kimble MD    HPI:  This is a 65 y.o. female with a chief complaint of No chief complaint on file.  . The patient has Past medical history/Past surgical history of  cervical radiculopathy, cervical facet arthropathy, lumbar degenerative disc disease, cervical degenerative disc disease, history of TIA, history of stroke, anxiety, depression, bipolar, hypertension, hypothyroidism, morbid obesity, BMI of 44    Patient was evaluated and referred by  primary care provider for cervical radiculopathy  Patient was previously seen 2019 for low back and neck pain, patient had previous lumbar epidural and cervical epidural steroid injections    Interval History 2025:  Gricelda Gil is here for follow up visit to discuss her MRI cervical lumbar spine.    MRI cervical spine showed  severe right neural foramina narrowing at C5-C6 relative  new findings in compared to her previous MRI.     MRI lumbar spine showed severe central canal stenosis at L4-L5    Current pain score: ***/10    Diabetic: No    Anticoagulation medications: None    Allergy To Iodine: No    Currently on Antibiotic: No    Pain Disability Index Review:      7/10/2025     1:31 PM 2019    11:45 AM 2019    11:07 AM   Last 3 PDI Scores   Pain Disability Index (PDI) 19 18 19       Current Description of Pain Symptoms:    History of Recent Fall or Trauma: No   Onset: Chronic, started on going   Pain Location: neck, mostly axial  in addition to axial low back pain: worse with standing    Radiates/associated symptoms: shoulder blade  Pain is Getting worse over the last 3 months   The pain is intermittent.   The pain is described as aching, burning, sharp, shooting, stabbing, throbbing,  and stiffness  Exacerbating factors: Sitting, Standing, Laying, and Walking.   Mitigating factors N/A.   Symptoms interfere with daily activity, sleeping.   The patient feels like symptoms have been worsening.   Patient denies night fever/night sweats, urinary incontinence, bowel incontinence, significant weight loss, significant motor weakness, and loss of sensations.    Pain score:  Current: 4/10  Best: 4/10  Worst: 10/10    Current pain medication:   Voltaren 50mg, BID  Cymbalta 60 QHS   Lyrica 75mg, BID  Current Narcotics/Opioid /benzo Medications:  Opioids- None  Benzodiazepines: No    UDS:  NA    PDMP:  Reviewed and consistent with medication use as prescribed.     Previous Chronic Pain Treatment History:  Six weeks of conservative therapy include: Physical Therapy/HEP/Physician Lead Exercise Program:  Over the past 12 months, Patient has done  0 sessions.  Is patient actively participating in home exercise program (HEP)/ physician led exercise program in the last 6 months: Yes.    Non-interventional Pain Therapy:  []Chiropractor.   []Acupuncture/Dry needle.  []TENS unit.  [x]Heat/ICE.  []Back Brace.    Medications previously tried:  NSAIDs: Ibuprofen (Advil/Motrin) and Diclofenac  Topical Agent: Yes  TCA/SSRI/SNRI: SNRI: Duloxetine (Cymbalta)   Anti-convulsants: Gabapentin , Lyrica , and Lamotrigine   Muscle Relaxants: Flexeril (Cyclobenzaprine) and Tizanidine (Zanaflex)  Opioids- None.    Interventional Pain Procedures:  07/09/2025- TPI- 70% relief, Done in ER   5/17/19 Lumbar Interlaminar Epidural Steroid Injection under Fluoroscopic Guidance  9/16/16 C7-T1 CERVICAL EPIDURAL STEROID INJECTION   4/29/16 C7-T1 CERVICAL EPIDURAL STEROID INJECTION,   1/6/12, 11/17/11, 10/5/11, 6/27/11, 3/11/10 Cervical Epidural Steroid Injection    Previous spine/Relevant joint surgery:  Left total knee replacement- 2020  Surgical History:   has a past surgical history that includes Rt shoulder surgery (Right, 2004);  Uvulopalatopharyngoplasty (); Nasal septum surgery (); Hysterectomy (); Tonsillectomy (); Adenoidectomy;  section (); Carpal tunnel release (); Epidural steroid injection into lumbar spine (N/A, 2019); left total knee arthroplasty (Left, 2020); Ear tube removal (Left, 2024); Tympanoplasty (Left, 2024); and eustachian tuboplasty (Left, 2024).  Medical History:   has a past medical history of Arthritis, Bipolar disorder, Depression, History of psychiatric care, History of psychiatric hospitalization, Hyperlipidemia, Hypertension, Insomnia, Lupus, Psychiatric problem, Self-harming behavior, Stroke (), Suicide attempt, Therapy, and Thyroid disease.  Family History:  family history includes Cancer in her father and paternal aunt; Heart disease in her maternal aunt, maternal grandfather, maternal uncle, and sister; No Known Problems in her brother, maternal grandmother, mother, paternal grandfather, paternal grandmother, and paternal uncle.  Allergies:  Patient has no known allergies.   Social History/SUBSTANCE ABUSE HISTORY:   reports that she quit smoking about 16 years ago. Her smoking use included cigarettes. She has been exposed to tobacco smoke. She has never used smokeless tobacco. She reports that she does not drink alcohol and does not use drugs.  LABS:  CBC  Lab Results   Component Value Date    WBC 8.16 2024    HGB 14.1 2024    HCT 42.5 2024     Coagulation Profile   Lab Results   Component Value Date     2024       Lab Results   Component Value Date    INR 0.9 2018     CMP:  BMP  Lab Results   Component Value Date     2025    K 4.5 2025     2025    CO2 29 2025    BUN 13 2025    CREATININE 0.8 2025    CALCIUM 9.5 2025    ANIONGAP 4 (L) 2025    EGFRNORACEVR >60 2025     Lab Results   Component Value Date    ALT 17 2025    AST 16 2025     ALKPHOS 132 06/05/2025    BILITOT 0.6 06/05/2025     HGBA1C:  Lab Results   Component Value Date    HGBA1C 5.6 12/04/2024       ROS:    Review of Systems   GENERAL:  No weight loss, malaise or fevers.  HEENT:   No recent changes in vision or hearing  NECK:  Negative for lumps, no difficulty with swallowing.  RESPIRATORY:  Negative for cough, wheezing or shortness of breath, patient denies any recent URI.  CARDIOVASCULAR:  Negative for chest pain or palpitations.  GI:  Negative for abdominal discomfort, blood in stools or black stools or change in bowel habits.  MUSCULOSKELETAL:  See HPI.  SKIN:  Negative for lesions, rash, and itching.  PSYCH:  No mood disorder or recent psychosocial stressors.   HEMATOLOGY/LYMPHOLOGY:  See the blood thinner sectioned in HPI.  NEURO:  See HPI  All other reviewed and negative other than HPI.    PHYSICAL EXAM:  VITALS: There were no vitals taken for this visit.  There is no height or weight on file to calculate BMI.  GENERAL: Well appearing, in no acute distress, alert and oriented x3, answers questions appropriately.   PSYCH: Flat affect.  SKIN: Skin color, texture, turgor normal, no rashes or lesions.  HEAD/FACE:  Normocephalic, atraumatic. Cranial nerves grossly intact.  CV: Regular rate  PULM: No evidence of respiratory difficulty, symmetric chest rise.  GI:  Soft and non-Distended.  NECK: (+++) pain to palpation over the cervical paraspinous muscles. Spurling:Negative. (+++) pain with neck flexion, extension, or lateral flexion, Muscle strength in RT and  UE 5/5   GAIT:  normal gait    DIAGNOSTIC STUDIES AND MEDICAL RECORDS REVIEW:  I have personally reviewed and interpreted relevant radiology reports and reviewed relevant records from other services in the EMR.   -  MRI cervical spine 2016  Vertebral body alignment is maintained with straightening of the normal cervical lordosis. Vertebral body heights are within normal limits. There is disc desiccation throughout the  cervical spine with disc space narrowing. The marrow signal is normal without evidence for a marrow placement process, infection or tumor.     At C2-3, no disk herniation, central canal stenosis or neural foraminal narrowing.     At C3-4, no disk herniation, central canal stenosis or neural foraminal narrowing.     At C4-5, no disk herniation, central canal stenosis or neural foraminal narrowing.     At C5-6, there is bilateral uncovertebral spurring without central canal stenosis neural foraminal narrowing.     At C6-7, there is a posterior disc osteophyte complex with bilateral uncovertebral spurring contributing to mild left-sided neural foraminal narrowing.     At C7-T1, no disk herniation, central canal stenosis or neural foraminal narrowing.  IMPRESSION:         Mild spondylosis of the cervical spine contribute to a most left-sided neural foraminal narrowing at C6-7. No central canal stenosis.    -  MRI CERVICAL SPINE  The incidentally visualized soft tissue structures of the neck have a normal appearance.     The craniocervical junction is intact.  There is no evidence for a Chiari malformation.  The spinal cord is normal in signal without cord edema or myelomalacia.     Straightening of the normal cervical lordosis.  Vertebral body heights are maintained.  There is disc desiccation with disc space narrowing throughout the cervical spine.  The marrow signal is normal without evidence for a marrow replacement process, infection or tumor.     At C2-3, there is facet arthropathy with mild left neural foraminal narrowing.  No central canal stenosis.     At C3-4, posterior disc osteophyte complex with uncovertebral spurring and facet arthropathy contributing to mild left neural foraminal narrowing.     At C4-5, no disc herniation, central canal stenosis or neural foraminal narrowing.     At C5-6, posterior disc osteophyte complex with uncovertebral spurring and facet arthropathy contributing to mild central canal  stenosis with moderate severe right neural foraminal narrowing with mild left neural foraminal narrowing.     At C6-7, posterior disc osteophyte complex with uncovertebral spurring and facet arthropathy contributing to mild central canal stenosis with mild left neural foraminal narrowing.     At C7-T1, posterior disc osteophyte complex.  No central canal stenosis or neural foraminal narrowing.     Impression:     Multilevel degenerative changes of the cervical spine contributing to with and neural foraminal narrowing as detailed in the above level by level description.    -MRI lumbar spine 07/2025  The incidentally visualized soft tissue structures of the abdomen have a normal appearance.     Vertebral body alignment and heights are maintained.  Minimal disc desiccation throughout the lumbar spine.  Type 2 Modic changes at L4-5.  The marrow signal is otherwise normal without evidence for a marrow replacement process, infection or tumor.  The conus terminates at L1.     At T12-L1, no disc herniation, central canal stenosis or neural foraminal narrowing.     At L1-2, no disc herniation, central canal stenosis or neural foraminal narrowing.     At L2-3, mild facet arthropathy with minimal disc bulging.  No central canal stenosis or neural foraminal narrowing.     At L3-4, circumferential disc bulge extending into both neural foramina with facet arthropathy contributing to mild bilateral neural foraminal narrowing.     At L4-5, circumferential disc bulge with ligamentum flavum hypertrophy and facet arthropathy contributing to moderate severe central canal stenosis with minimal bilateral neural foraminal narrowing.     At L5-S1, mild facet arthropathy with minimal broad-based disc bulging.  No significant central canal stenosis or neural foraminal narrowing.     Impression:     Multilevel degenerative changes of the lumbar spine contributing to central canal stenosis and neural foraminal narrowing as detailed in the  above level by level description.    Clinical Impression:  This is a pleasant 65 y.o. female patient with PMH/PSH of cervical radiculopathy, cervical facet arthropathy, lumbar degenerative disc disease, cervical degenerative disc disease, history of TIA, history of stroke, anxiety, depression, bipolar, hypertension, hypothyroidism, morbid obesity, BMI of 44, presenting with axial neck and low back pain worsening over the last 3 months, patient had previous lumbar and cervical epidural steroid injection with limited relief, declined formal physical therapy, she has completed more than 6 weeks of home exercise, she takes Lyrica in addition to Cymbalta.    Encounter Diagnosis:  Gricelda Gil is a 65 y.o. female with the following diagnoses based on history, exam, and imaging:  There are no diagnoses linked to this encounter.     Treatment Plan:    Diagnostics/Referrals: X-ray cervical spine flexion-extension and MRI cervical spine+   x-ray and MRI lumbar spine    Medications:     Continue as prescribed   Voltaren 50mg, BID  Cymbalta 60 QHS   Lyrica 75mg, BID    Interventional Therapy: Procedure based on MRI images.  Sedation: NA.  Anticoagulation medications: None -Clearance to stop Blood thinner: NA      Physical Rehabilitation: Physician led exercise program and home exercise    Patient Education: Counseled patient regarding the importance of activity modification, I have stressed the importance of physical activity and a home exercise plan to help with pain and improve health.    Follow-up: RTC  to discuss images.    May consider: [email or web address], ONENRRIQUE Zelaya MD  Anesthesiologist  Interventional Pain Medicine  07/31/2025    Disclaimer:  This note was prepared using voice recognition system and is likely to have sound alike errors that may have been overlooked even after proof reading.  Please call me with any questions.

## 2025-07-31 NOTE — PROGRESS NOTES
Establish Patient Evaluation  [facility]    Gricelda Gil  : 1960  Date: 2025     CHIEF COMPLAINT:  discuss images and Neck Pain    Referring Physician: No ref. provider found  Primary Care Physician: Major Kimble MD    HPI:  This is a 65 y.o. female with a chief complaint of discuss images and Neck Pain  . The patient has Past medical history/Past surgical history of  cervical radiculopathy, cervical facet arthropathy, lumbar degenerative disc disease, cervical degenerative disc disease, history of TIA, history of stroke, anxiety, depression, bipolar, hypertension, hypothyroidism, morbid obesity, BMI of 44    Patient was evaluated and referred by  primary care provider for cervical radiculopathy  Patient was previously seen 2019 for low back and neck pain, patient had previous lumbar epidural and cervical epidural steroid injections    Interval History 2025:  Gricelda Gil is here for follow up visit to discuss her MRI cervical lumbar spine.    MRI cervical spine showed  severe right neural foramina narrowing at C5-C6 relative  new findings in compared to her previous MRI.     MRI lumbar spine showed severe central canal stenosis at L4-L5 which is a relatively new finding from her previous MRI lumbar spine  Current pain score: 4/10    Diabetic: No    Anticoagulation medications: None    Allergy To Iodine: No    Currently on Antibiotic: No    Pain Disability Index Review:      7/10/2025     1:31 PM 2019    11:45 AM 2019    11:07 AM   Last 3 PDI Scores   Pain Disability Index (PDI) 19 18 19       Current Description of Pain Symptoms:    History of Recent Fall or Trauma: No   Onset: Chronic, started on going   Pain Location: neck, mostly axial  in addition to axial low back pain: worse with standing   and leaning forward  Radiates/associated symptoms: shoulder blade  Pain is Getting worse over the last 3 months   The pain is  intermittent.   The pain is described as aching, burning, sharp, shooting, stabbing, throbbing, and stiffness  Exacerbating factors: Sitting, Standing, Laying, and Walking.   Mitigating factors N/A.   Symptoms interfere with daily activity, sleeping.   The patient feels like symptoms have been worsening.   Patient denies night fever/night sweats, urinary incontinence, bowel incontinence, significant weight loss, significant motor weakness, and loss of sensations.    Pain score:  Current: 4/10  Best: 4/10  Worst: 10/10    Current pain medication:   Voltaren 50mg, BID  Cymbalta 60 QHS   Lyrica 75mg, BID  Current Narcotics/Opioid /benzo Medications:  Opioids- None  Benzodiazepines: No    UDS:  NA    PDMP:  Reviewed and consistent with medication use as prescribed.     Previous Chronic Pain Treatment History:  Six weeks of conservative therapy include: Physical Therapy/HEP/Physician Lead Exercise Program:  Over the past 12 months, Patient has done  0 sessions.  Is patient actively participating in home exercise program (HEP)/ physician led exercise program in the last 6 months: Yes.    Non-interventional Pain Therapy:  []Chiropractor.   []Acupuncture/Dry needle.  []TENS unit.  [x]Heat/ICE.  []Back Brace.    Medications previously tried:  NSAIDs: Ibuprofen (Advil/Motrin) and Diclofenac  Topical Agent: Yes  TCA/SSRI/SNRI: SNRI: Duloxetine (Cymbalta)   Anti-convulsants: Gabapentin , Lyrica , and Lamotrigine   Muscle Relaxants: Flexeril (Cyclobenzaprine) and Tizanidine (Zanaflex)  Opioids- None.    Interventional Pain Procedures:  07/09/2025- TPI- 70% relief, Done in ER   5/17/19 Lumbar Interlaminar Epidural Steroid Injection under Fluoroscopic Guidance  9/16/16 C7-T1 CERVICAL EPIDURAL STEROID INJECTION   4/29/16 C7-T1 CERVICAL EPIDURAL STEROID INJECTION,   1/6/12, 11/17/11, 10/5/11, 6/27/11, 3/11/10 Cervical Epidural Steroid Injection    Previous spine/Relevant joint surgery:  Left total knee replacement- 2020  Surgical  History:   has a past surgical history that includes Rt shoulder surgery (Right, ); Uvulopalatopharyngoplasty (); Nasal septum surgery (); Hysterectomy (); Tonsillectomy (); Adenoidectomy;  section (); Carpal tunnel release (); Epidural steroid injection into lumbar spine (N/A, 2019); left total knee arthroplasty (Left, 2020); Ear tube removal (Left, 2024); Tympanoplasty (Left, 2024); and eustachian tuboplasty (Left, 2024).  Medical History:   has a past medical history of Arthritis, Bipolar disorder, Depression, History of psychiatric care, History of psychiatric hospitalization, Hyperlipidemia, Hypertension, Insomnia, Lupus, Psychiatric problem, Self-harming behavior, Stroke (), Suicide attempt, Therapy, and Thyroid disease.  Family History:  family history includes Cancer in her father and paternal aunt; Heart disease in her maternal aunt, maternal grandfather, maternal uncle, and sister; No Known Problems in her brother, maternal grandmother, mother, paternal grandfather, paternal grandmother, and paternal uncle.  Allergies:  Patient has no known allergies.   Social History/SUBSTANCE ABUSE HISTORY:   reports that she quit smoking about 16 years ago. Her smoking use included cigarettes. She has been exposed to tobacco smoke. She has never used smokeless tobacco. She reports that she does not drink alcohol and does not use drugs.  LABS:  CBC  Lab Results   Component Value Date    WBC 8.16 2024    HGB 14.1 2024    HCT 42.5 2024     Coagulation Profile   Lab Results   Component Value Date     2024       Lab Results   Component Value Date    INR 0.9 2018     CMP:  BMP  Lab Results   Component Value Date     2025    K 4.5 2025     2025    CO2 29 2025    BUN 13 2025    CREATININE 0.8 2025    CALCIUM 9.5 2025    ANIONGAP 4 (L) 2025    EGFRNORACEVR >60  "06/05/2025     Lab Results   Component Value Date    ALT 17 06/05/2025    AST 16 06/05/2025    ALKPHOS 132 06/05/2025    BILITOT 0.6 06/05/2025     HGBA1C:  Lab Results   Component Value Date    HGBA1C 5.6 12/04/2024       ROS:    Review of Systems   GENERAL:  No weight loss, malaise or fevers.  HEENT:   No recent changes in vision or hearing  NECK:  Negative for lumps, no difficulty with swallowing.  RESPIRATORY:  Negative for cough, wheezing or shortness of breath, patient denies any recent URI.  CARDIOVASCULAR:  Negative for chest pain or palpitations.  GI:  Negative for abdominal discomfort, blood in stools or black stools or change in bowel habits.  MUSCULOSKELETAL:  See HPI.  SKIN:  Negative for lesions, rash, and itching.  PSYCH:  No mood disorder or recent psychosocial stressors.   HEMATOLOGY/LYMPHOLOGY:  See the blood thinner sectioned in HPI.  NEURO:  See HPI  All other reviewed and negative other than HPI.    PHYSICAL EXAM:  VITALS: BP (!) 158/80 (BP Location: Left arm, Patient Position: Sitting)   Pulse 83   Ht 5' 5" (1.651 m)   Wt 117.5 kg (259 lb)   SpO2 (!) 94%   BMI 43.10 kg/m²   Body mass index is 43.1 kg/m².  GENERAL: Well appearing, in no acute distress, alert and oriented x3, answers questions appropriately.   PSYCH: Flat affect.  SKIN: Skin color, texture, turgor normal, no rashes or lesions.  HEAD/FACE:  Normocephalic, atraumatic. Cranial nerves grossly intact.  CV: Regular rate  PULM: No evidence of respiratory difficulty, symmetric chest rise.  GI:  Soft and non-Distended.  NECK: (+++) pain to palpation over the cervical paraspinous muscles. Spurling:Negative. (+++) pain with neck flexion, extension, or lateral flexion, Muscle strength in RT and  UE 5/5   GAIT:  normal gait    DIAGNOSTIC STUDIES AND MEDICAL RECORDS REVIEW:  I have personally reviewed and interpreted relevant radiology reports and reviewed relevant records from other services in the EMR.   -  MRI cervical spine " 2016  Vertebral body alignment is maintained with straightening of the normal cervical lordosis. Vertebral body heights are within normal limits. There is disc desiccation throughout the cervical spine with disc space narrowing. The marrow signal is normal without evidence for a marrow placement process, infection or tumor.     At C2-3, no disk herniation, central canal stenosis or neural foraminal narrowing.     At C3-4, no disk herniation, central canal stenosis or neural foraminal narrowing.     At C4-5, no disk herniation, central canal stenosis or neural foraminal narrowing.     At C5-6, there is bilateral uncovertebral spurring without central canal stenosis neural foraminal narrowing.     At C6-7, there is a posterior disc osteophyte complex with bilateral uncovertebral spurring contributing to mild left-sided neural foraminal narrowing.     At C7-T1, no disk herniation, central canal stenosis or neural foraminal narrowing.  IMPRESSION:         Mild spondylosis of the cervical spine contribute to a most left-sided neural foraminal narrowing at C6-7. No central canal stenosis.    -  MRI CERVICAL SPINE 2025  The incidentally visualized soft tissue structures of the neck have a normal appearance.     The craniocervical junction is intact.  There is no evidence for a Chiari malformation.  The spinal cord is normal in signal without cord edema or myelomalacia.     Straightening of the normal cervical lordosis.  Vertebral body heights are maintained.  There is disc desiccation with disc space narrowing throughout the cervical spine.  The marrow signal is normal without evidence for a marrow replacement process, infection or tumor.     At C2-3, there is facet arthropathy with mild left neural foraminal narrowing.  No central canal stenosis.     At C3-4, posterior disc osteophyte complex with uncovertebral spurring and facet arthropathy contributing to mild left neural foraminal narrowing.     At C4-5, no disc  herniation, central canal stenosis or neural foraminal narrowing.     At C5-6, posterior disc osteophyte complex with uncovertebral spurring and facet arthropathy contributing to mild central canal stenosis with moderate severe right neural foraminal narrowing with mild left neural foraminal narrowing.     At C6-7, posterior disc osteophyte complex with uncovertebral spurring and facet arthropathy contributing to mild central canal stenosis with mild left neural foraminal narrowing.     At C7-T1, posterior disc osteophyte complex.  No central canal stenosis or neural foraminal narrowing.     Impression:     Multilevel degenerative changes of the cervical spine contributing to with and neural foraminal narrowing as detailed in the above level by level description.    -MRI lumbar spine 07/2025  The incidentally visualized soft tissue structures of the abdomen have a normal appearance.     Vertebral body alignment and heights are maintained.  Minimal disc desiccation throughout the lumbar spine.  Type 2 Modic changes at L4-5.  The marrow signal is otherwise normal without evidence for a marrow replacement process, infection or tumor.  The conus terminates at L1.     At T12-L1, no disc herniation, central canal stenosis or neural foraminal narrowing.     At L1-2, no disc herniation, central canal stenosis or neural foraminal narrowing.     At L2-3, mild facet arthropathy with minimal disc bulging.  No central canal stenosis or neural foraminal narrowing.     At L3-4, circumferential disc bulge extending into both neural foramina with facet arthropathy contributing to mild bilateral neural foraminal narrowing.     At L4-5, circumferential disc bulge with ligamentum flavum hypertrophy and facet arthropathy contributing to moderate severe central canal stenosis with minimal bilateral neural foraminal narrowing.     At L5-S1, mild facet arthropathy with minimal broad-based disc bulging.  No significant central canal  stenosis or neural foraminal narrowing.     Impression:     Multilevel degenerative changes of the lumbar spine contributing to central canal stenosis and neural foraminal narrowing as detailed in the above level by level description.    Clinical Impression:  This is a pleasant 65 y.o. female patient with PMH/PSH of cervical radiculopathy, cervical facet arthropathy, lumbar degenerative disc disease, cervical degenerative disc disease, history of TIA, history of stroke, anxiety, depression, bipolar, hypertension, hypothyroidism, morbid obesity, BMI of 44, presenting with axial neck and low back pain worsening over the last 3 months, patient had previous lumbar and cervical epidural steroid injection with limited relief, declined formal physical therapy, she has completed more than 6 weeks of home exercise, she takes Lyrica in addition to Cymbalta. MRI cervical spine showed severe right neural foramina narrowing at C5-C6 which is a relative new finding in compared to her previous MRI in addition to severe central canal stenosis at L4-L5    Encounter Diagnosis:  Gricelda Gil is a 65 y.o. female with the following diagnoses based on history, exam, and imagin. Chronic neck pain    2. Degeneration of intervertebral disc of cervical region with osteophyte of cervical vertebra    3. Arthropathy of cervical facet joint    4. Vertebrogenic low back pain    5. Lumbar facet arthropathy    6. Degeneration of intervertebral disc of lumbar region with discogenic back pain       Treatment Plan:    Diagnostics/Referrals:  continue with a home exercise    Medications:     Continue as prescribed   Voltaren 50mg, BID  Cymbalta 60 QHS   Lyrica 75mg, BID    Interventional Therapy:  Please schedule for Cervical epidural steroid injection at C7-T1 as she had previous cervical epidural steroid injection with temporary relief  Sedation: oral  Anticoagulation medications: None -Clearance to stop Blood thinner: NA      Physical  Rehabilitation: Physician led exercise program and home exercise    Patient Education: Counseled patient regarding the importance of activity modification, I have stressed the importance of physical activity and a home exercise plan to help with pain and improve health.    Follow-up: RTC 4 weeks    May consider: CMBB.LMBB, ONB:  does not have significant occipital headache      Nancy Zelaya MD  Anesthesiologist  Interventional Pain Medicine  07/31/2025    Disclaimer:  This note was prepared using voice recognition system and is likely to have sound alike errors that may have been overlooked even after proof reading.  Please call me with any questions.

## 2025-08-05 ENCOUNTER — PATIENT MESSAGE (OUTPATIENT)
Dept: ADMINISTRATIVE | Facility: OTHER | Age: 65
End: 2025-08-05
Payer: MEDICARE

## 2025-08-08 ENCOUNTER — HOSPITAL ENCOUNTER (OUTPATIENT)
Dept: RADIOLOGY | Facility: HOSPITAL | Age: 65
Discharge: HOME | End: 2025-08-08
Attending: ANESTHESIOLOGY
Payer: MEDICARE

## 2025-08-08 ENCOUNTER — HOSPITAL ENCOUNTER (OUTPATIENT)
Facility: HOSPITAL | Age: 65
Discharge: HOME | End: 2025-08-08
Attending: ANESTHESIOLOGY | Admitting: ANESTHESIOLOGY
Payer: MEDICARE

## 2025-08-08 VITALS
RESPIRATION RATE: 18 BRPM | SYSTOLIC BLOOD PRESSURE: 122 MMHG | TEMPERATURE: 97 F | DIASTOLIC BLOOD PRESSURE: 67 MMHG | WEIGHT: 258 LBS | HEART RATE: 74 BPM | BODY MASS INDEX: 42.99 KG/M2 | HEIGHT: 65 IN | OXYGEN SATURATION: 96 %

## 2025-08-08 DIAGNOSIS — G89.29 CHRONIC PAIN: ICD-10-CM

## 2025-08-08 DIAGNOSIS — F41.9 ANXIETY: ICD-10-CM

## 2025-08-08 DIAGNOSIS — M54.12 CERVICAL RADICULOPATHY: ICD-10-CM

## 2025-08-08 DIAGNOSIS — M54.12 CERVICAL RADICULOPATHY: Primary | ICD-10-CM

## 2025-08-08 PROCEDURE — 63600175 PHARM REV CODE 636 W HCPCS: Performed by: ANESTHESIOLOGY

## 2025-08-08 PROCEDURE — 25500020 PHARM REV CODE 255: Performed by: ANESTHESIOLOGY

## 2025-08-08 PROCEDURE — 76000 FLUOROSCOPY <1 HR PHYS/QHP: CPT | Mod: TC

## 2025-08-08 PROCEDURE — 25000003 PHARM REV CODE 250: Performed by: ANESTHESIOLOGY

## 2025-08-08 PROCEDURE — 62321 NJX INTERLAMINAR CRV/THRC: CPT | Mod: ,,, | Performed by: ANESTHESIOLOGY

## 2025-08-08 PROCEDURE — 62321 NJX INTERLAMINAR CRV/THRC: CPT | Performed by: ANESTHESIOLOGY

## 2025-08-08 RX ORDER — DEXAMETHASONE SODIUM PHOSPHATE 10 MG/ML
INJECTION, SOLUTION INTRA-ARTICULAR; INTRALESIONAL; INTRAMUSCULAR; INTRAVENOUS; SOFT TISSUE
Status: DISCONTINUED | OUTPATIENT
Start: 2025-08-08 | End: 2025-08-08 | Stop reason: HOSPADM

## 2025-08-08 RX ORDER — LIDOCAINE HYDROCHLORIDE 10 MG/ML
INJECTION, SOLUTION EPIDURAL; INFILTRATION; INTRACAUDAL; PERINEURAL
Status: DISCONTINUED | OUTPATIENT
Start: 2025-08-08 | End: 2025-08-08 | Stop reason: HOSPADM

## 2025-08-08 RX ORDER — ALPRAZOLAM 0.25 MG/1
0.5 TABLET, ORALLY DISINTEGRATING ORAL
Status: DISCONTINUED | OUTPATIENT
Start: 2025-08-08 | End: 2025-08-08 | Stop reason: HOSPADM

## 2025-08-08 RX ORDER — LIDOCAINE HYDROCHLORIDE 20 MG/ML
INJECTION, SOLUTION INFILTRATION; PERINEURAL
Status: DISCONTINUED | OUTPATIENT
Start: 2025-08-08 | End: 2025-08-08 | Stop reason: HOSPADM

## 2025-08-08 RX ADMIN — ALPRAZOLAM 0.5 MG: 0.25 TABLET, ORALLY DISINTEGRATING ORAL at 01:08

## 2025-08-15 ENCOUNTER — PATIENT OUTREACH (OUTPATIENT)
Dept: ADMINISTRATIVE | Facility: HOSPITAL | Age: 65
End: 2025-08-15
Payer: MEDICARE

## 2025-09-02 DIAGNOSIS — M25.561 CHRONIC PAIN OF RIGHT KNEE: ICD-10-CM

## 2025-09-02 DIAGNOSIS — M54.50 CHRONIC BILATERAL LOW BACK PAIN WITHOUT SCIATICA: ICD-10-CM

## 2025-09-02 DIAGNOSIS — M25.562 CHRONIC PAIN OF LEFT KNEE: ICD-10-CM

## 2025-09-02 DIAGNOSIS — G89.29 CHRONIC BILATERAL LOW BACK PAIN WITHOUT SCIATICA: ICD-10-CM

## 2025-09-02 DIAGNOSIS — F32.A DEPRESSION, UNSPECIFIED DEPRESSION TYPE: ICD-10-CM

## 2025-09-02 DIAGNOSIS — E78.5 DYSLIPIDEMIA: ICD-10-CM

## 2025-09-02 DIAGNOSIS — Z96.652 S/P TOTAL KNEE ARTHROPLASTY, LEFT: ICD-10-CM

## 2025-09-02 DIAGNOSIS — G89.29 CHRONIC PAIN OF LEFT KNEE: ICD-10-CM

## 2025-09-02 DIAGNOSIS — I10 PRIMARY HYPERTENSION: ICD-10-CM

## 2025-09-02 DIAGNOSIS — G89.29 CHRONIC PAIN OF RIGHT KNEE: ICD-10-CM

## 2025-09-03 RX ORDER — AMLODIPINE BESYLATE AND OLMESARTAN MEDOXOMIL 10; 40 MG/1; MG/1
1 TABLET ORAL DAILY
Qty: 30 TABLET | Refills: 5 | Status: SHIPPED | OUTPATIENT
Start: 2025-09-03 | End: 2026-09-03

## 2025-09-03 RX ORDER — ATORVASTATIN CALCIUM 40 MG/1
40 TABLET, FILM COATED ORAL DAILY
Qty: 90 TABLET | Refills: 1 | Status: SHIPPED | OUTPATIENT
Start: 2025-09-03

## 2025-09-03 RX ORDER — DICLOFENAC SODIUM 50 MG/1
50 TABLET, DELAYED RELEASE ORAL 2 TIMES DAILY PRN
Qty: 60 TABLET | Refills: 1 | Status: SHIPPED | OUTPATIENT
Start: 2025-09-03

## 2025-09-03 RX ORDER — DULOXETIN HYDROCHLORIDE 60 MG/1
60 CAPSULE, DELAYED RELEASE ORAL DAILY
Qty: 90 CAPSULE | Refills: 2 | Status: SHIPPED | OUTPATIENT
Start: 2025-09-03

## 2025-09-05 ENCOUNTER — TELEPHONE (OUTPATIENT)
Dept: PAIN MEDICINE | Facility: CLINIC | Age: 65
End: 2025-09-05
Payer: MEDICARE

## (undated) DEVICE — KIT NERVE BLOCK PREP BAPTIST

## (undated) DEVICE — CHLORAPREP 10.5 ML APPLICATOR

## (undated) DEVICE — MARKER SKIN RULER AND LABEL